# Patient Record
Sex: FEMALE | Race: WHITE | Employment: FULL TIME | URBAN - METROPOLITAN AREA
[De-identification: names, ages, dates, MRNs, and addresses within clinical notes are randomized per-mention and may not be internally consistent; named-entity substitution may affect disease eponyms.]

---

## 2017-01-17 ENCOUNTER — OFFICE VISIT (OUTPATIENT)
Dept: FAMILY MEDICINE CLINIC | Age: 57
End: 2017-01-17

## 2017-01-17 VITALS
HEIGHT: 63 IN | DIASTOLIC BLOOD PRESSURE: 60 MMHG | SYSTOLIC BLOOD PRESSURE: 110 MMHG | BODY MASS INDEX: 20.94 KG/M2 | WEIGHT: 118.2 LBS | HEART RATE: 88 BPM

## 2017-01-17 DIAGNOSIS — G25.81 RESTLESS LEG: ICD-10-CM

## 2017-01-17 DIAGNOSIS — F98.8 ADD (ATTENTION DEFICIT DISORDER): ICD-10-CM

## 2017-01-17 DIAGNOSIS — Z11.4 SCREENING FOR HIV (HUMAN IMMUNODEFICIENCY VIRUS): ICD-10-CM

## 2017-01-17 DIAGNOSIS — K59.01 SLOW TRANSIT CONSTIPATION: ICD-10-CM

## 2017-01-17 DIAGNOSIS — Z11.59 NEED FOR HEPATITIS C SCREENING TEST: ICD-10-CM

## 2017-01-17 DIAGNOSIS — H81.12 BENIGN POSITIONAL VERTIGO, LEFT: Primary | ICD-10-CM

## 2017-01-17 PROCEDURE — 99214 OFFICE O/P EST MOD 30 MIN: CPT | Performed by: FAMILY MEDICINE

## 2017-01-17 RX ORDER — ACETAMINOPHEN 160 MG
1 TABLET,DISINTEGRATING ORAL ONCE
COMMUNITY
End: 2017-01-17 | Stop reason: ALTCHOICE

## 2017-01-17 RX ORDER — POLYETHYLENE GLYCOL 3350 17 G/17G
17 POWDER, FOR SOLUTION ORAL DAILY
Qty: 30 EACH | Refills: 5 | Status: SHIPPED | OUTPATIENT
Start: 2017-01-17 | End: 2018-06-15 | Stop reason: SDUPTHER

## 2017-01-17 RX ORDER — ATOMOXETINE 40 MG/1
40 CAPSULE ORAL EVERY MORNING
Qty: 30 CAPSULE | Refills: 5 | Status: SHIPPED | OUTPATIENT
Start: 2017-01-17 | End: 2017-07-17 | Stop reason: SDUPTHER

## 2017-01-18 LAB
HEPATITIS C ANTIBODY INTERPRETATION: NORMAL
HIV-1 AND HIV-2 ANTIBODIES: NORMAL

## 2017-01-18 ASSESSMENT — ENCOUNTER SYMPTOMS: CONSTIPATION: 1

## 2017-01-20 ENCOUNTER — PATIENT MESSAGE (OUTPATIENT)
Dept: FAMILY MEDICINE CLINIC | Age: 57
End: 2017-01-20

## 2017-05-04 ENCOUNTER — OFFICE VISIT (OUTPATIENT)
Dept: FAMILY MEDICINE CLINIC | Age: 57
End: 2017-05-04

## 2017-05-04 VITALS
HEIGHT: 63 IN | SYSTOLIC BLOOD PRESSURE: 100 MMHG | BODY MASS INDEX: 21.26 KG/M2 | TEMPERATURE: 98.1 F | WEIGHT: 120 LBS | HEART RATE: 66 BPM | DIASTOLIC BLOOD PRESSURE: 70 MMHG

## 2017-05-04 DIAGNOSIS — R11.0 CHRONIC NAUSEA: Primary | ICD-10-CM

## 2017-05-04 DIAGNOSIS — Z82.62 FAMILY HISTORY OF OSTEOPOROSIS: ICD-10-CM

## 2017-05-04 DIAGNOSIS — K90.41 GLUTEN-SENSITIVE ENTEROPATHY: Chronic | ICD-10-CM

## 2017-05-04 DIAGNOSIS — J20.9 ACUTE BRONCHITIS, UNSPECIFIED ORGANISM: ICD-10-CM

## 2017-05-04 DIAGNOSIS — M40.14 OTHER SECONDARY KYPHOSIS, THORACIC REGION: ICD-10-CM

## 2017-05-04 PROBLEM — M40.209 HUMPBACK: Status: ACTIVE | Noted: 2017-05-04

## 2017-05-04 PROCEDURE — 99214 OFFICE O/P EST MOD 30 MIN: CPT | Performed by: FAMILY MEDICINE

## 2017-05-04 RX ORDER — AZITHROMYCIN 250 MG/1
TABLET, FILM COATED ORAL
Qty: 1 PACKET | Refills: 0 | Status: SHIPPED | OUTPATIENT
Start: 2017-05-04 | End: 2017-05-14

## 2017-05-04 RX ORDER — PROMETHAZINE HYDROCHLORIDE 25 MG/1
25 TABLET ORAL EVERY 6 HOURS PRN
Qty: 25 TABLET | Refills: 1 | Status: SHIPPED | OUTPATIENT
Start: 2017-05-04 | End: 2017-05-29

## 2017-05-04 ASSESSMENT — ENCOUNTER SYMPTOMS
RHINORRHEA: 1
COUGH: 1

## 2017-07-17 ENCOUNTER — OFFICE VISIT (OUTPATIENT)
Dept: FAMILY MEDICINE CLINIC | Age: 57
End: 2017-07-17

## 2017-07-17 VITALS
SYSTOLIC BLOOD PRESSURE: 94 MMHG | DIASTOLIC BLOOD PRESSURE: 54 MMHG | WEIGHT: 113.2 LBS | HEART RATE: 72 BPM | HEIGHT: 63 IN | BODY MASS INDEX: 20.06 KG/M2

## 2017-07-17 DIAGNOSIS — B00.9 HSV-1 (HERPES SIMPLEX VIRUS 1) INFECTION: Primary | ICD-10-CM

## 2017-07-17 DIAGNOSIS — F98.8 ADD (ATTENTION DEFICIT DISORDER): ICD-10-CM

## 2017-07-17 PROCEDURE — 99213 OFFICE O/P EST LOW 20 MIN: CPT | Performed by: FAMILY MEDICINE

## 2017-07-17 RX ORDER — VALACYCLOVIR HYDROCHLORIDE 1 G/1
TABLET, FILM COATED ORAL
Qty: 12 TABLET | Refills: 5 | Status: SHIPPED | OUTPATIENT
Start: 2017-07-17 | End: 2018-10-03 | Stop reason: SDUPTHER

## 2017-07-17 RX ORDER — THYROID,PORK 16.25 MG
TABLET ORAL
COMMUNITY
Start: 2017-06-20 | End: 2019-01-14

## 2017-07-17 RX ORDER — ATOMOXETINE 40 MG/1
40 CAPSULE ORAL EVERY MORNING
Qty: 30 CAPSULE | Refills: 5 | Status: SHIPPED | OUTPATIENT
Start: 2017-07-17 | End: 2018-01-18 | Stop reason: SDUPTHER

## 2017-07-17 RX ORDER — POVIDONE/POLYVINYL ALCOHOL 20; 27 G/1000ML; G/1000ML
1 SOLUTION/ DROPS OPHTHALMIC 2 TIMES DAILY
Refills: 3 | COMMUNITY
Start: 2017-05-30 | End: 2018-01-18

## 2017-07-17 ASSESSMENT — ENCOUNTER SYMPTOMS
SHORTNESS OF BREATH: 0
CHEST TIGHTNESS: 0

## 2017-07-17 ASSESSMENT — PATIENT HEALTH QUESTIONNAIRE - PHQ9
2. FEELING DOWN, DEPRESSED OR HOPELESS: 0
SUM OF ALL RESPONSES TO PHQ QUESTIONS 1-9: 0
SUM OF ALL RESPONSES TO PHQ9 QUESTIONS 1 & 2: 0
1. LITTLE INTEREST OR PLEASURE IN DOING THINGS: 0

## 2018-01-18 ENCOUNTER — OFFICE VISIT (OUTPATIENT)
Dept: FAMILY MEDICINE CLINIC | Age: 58
End: 2018-01-18

## 2018-01-18 VITALS
BODY MASS INDEX: 20.77 KG/M2 | HEIGHT: 63 IN | HEART RATE: 81 BPM | DIASTOLIC BLOOD PRESSURE: 64 MMHG | SYSTOLIC BLOOD PRESSURE: 100 MMHG | WEIGHT: 117.2 LBS | TEMPERATURE: 97.9 F

## 2018-01-18 DIAGNOSIS — F98.8 ATTENTION DEFICIT DISORDER (ADD) WITHOUT HYPERACTIVITY: ICD-10-CM

## 2018-01-18 PROCEDURE — G8420 CALC BMI NORM PARAMETERS: HCPCS | Performed by: FAMILY MEDICINE

## 2018-01-18 PROCEDURE — G8484 FLU IMMUNIZE NO ADMIN: HCPCS | Performed by: FAMILY MEDICINE

## 2018-01-18 PROCEDURE — 3014F SCREEN MAMMO DOC REV: CPT | Performed by: FAMILY MEDICINE

## 2018-01-18 PROCEDURE — 3017F COLORECTAL CA SCREEN DOC REV: CPT | Performed by: FAMILY MEDICINE

## 2018-01-18 PROCEDURE — G8427 DOCREV CUR MEDS BY ELIG CLIN: HCPCS | Performed by: FAMILY MEDICINE

## 2018-01-18 PROCEDURE — 99213 OFFICE O/P EST LOW 20 MIN: CPT | Performed by: FAMILY MEDICINE

## 2018-01-18 PROCEDURE — 1036F TOBACCO NON-USER: CPT | Performed by: FAMILY MEDICINE

## 2018-01-18 RX ORDER — ATOMOXETINE 40 MG/1
40 CAPSULE ORAL EVERY MORNING
Qty: 30 CAPSULE | Refills: 5 | Status: SHIPPED | OUTPATIENT
Start: 2018-01-18 | End: 2018-07-18 | Stop reason: SDUPTHER

## 2018-03-15 ENCOUNTER — OFFICE VISIT (OUTPATIENT)
Dept: FAMILY MEDICINE CLINIC | Age: 58
End: 2018-03-15

## 2018-03-15 VITALS
DIASTOLIC BLOOD PRESSURE: 68 MMHG | WEIGHT: 118 LBS | BODY MASS INDEX: 20.9 KG/M2 | SYSTOLIC BLOOD PRESSURE: 100 MMHG | TEMPERATURE: 98.1 F | HEART RATE: 82 BPM

## 2018-03-15 DIAGNOSIS — M25.552 LEFT HIP PAIN: Primary | ICD-10-CM

## 2018-03-15 DIAGNOSIS — J06.9 UPPER RESPIRATORY TRACT INFECTION, UNSPECIFIED TYPE: ICD-10-CM

## 2018-03-15 PROCEDURE — 99214 OFFICE O/P EST MOD 30 MIN: CPT | Performed by: FAMILY MEDICINE

## 2018-03-15 PROCEDURE — 3014F SCREEN MAMMO DOC REV: CPT | Performed by: FAMILY MEDICINE

## 2018-03-15 PROCEDURE — G8420 CALC BMI NORM PARAMETERS: HCPCS | Performed by: FAMILY MEDICINE

## 2018-03-15 PROCEDURE — G8427 DOCREV CUR MEDS BY ELIG CLIN: HCPCS | Performed by: FAMILY MEDICINE

## 2018-03-15 PROCEDURE — 1036F TOBACCO NON-USER: CPT | Performed by: FAMILY MEDICINE

## 2018-03-15 PROCEDURE — 3017F COLORECTAL CA SCREEN DOC REV: CPT | Performed by: FAMILY MEDICINE

## 2018-03-15 PROCEDURE — G8484 FLU IMMUNIZE NO ADMIN: HCPCS | Performed by: FAMILY MEDICINE

## 2018-03-15 RX ORDER — AMOXICILLIN 875 MG/1
875 TABLET, COATED ORAL 2 TIMES DAILY
Qty: 20 TABLET | Refills: 0 | Status: SHIPPED | OUTPATIENT
Start: 2018-03-15 | End: 2018-03-25

## 2018-03-15 ASSESSMENT — ENCOUNTER SYMPTOMS
RHINORRHEA: 1
COUGH: 1

## 2018-03-15 NOTE — PROGRESS NOTES
crepitus. Left hip: She exhibits decreased range of motion, decreased strength and bony tenderness. Legs:  Lymphadenopathy:        Head (right side): No submental, no submandibular and no posterior auricular adenopathy present. Head (left side): No submental, no submandibular and no posterior auricular adenopathy present. Right cervical: No superficial cervical, no deep cervical and no posterior cervical adenopathy present. Left cervical: No superficial cervical, no deep cervical and no posterior cervical adenopathy present. Psychiatric: She has a normal mood and affect. Her behavior is normal.       Body mass index is 20.9 kg/m². Wt Readings from Last 3 Encounters:   03/15/18 118 lb (53.5 kg)   01/18/18 117 lb 3.2 oz (53.2 kg)   07/17/17 113 lb 3.2 oz (51.3 kg)     BP Readings from Last 3 Encounters:   03/15/18 100/68   01/18/18 100/64   07/17/17 (!) 94/54          No results found for this visit on 03/15/18. Assessment:      1. Left hip pain  XR HIP LEFT (2-3 VIEWS)    Ambulatory referral to Physical Therapy   2. Upper respiratory tract infection, unspecified type  amoxicillin (AMOXIL) 875 MG tablet           Plan:              Increase fluids. Get plenty of rest.  Tylenol or Ibuprofen for fever or muscle aches. Wash hands frequently since you are contagious.

## 2018-03-26 ENCOUNTER — HOSPITAL ENCOUNTER (OUTPATIENT)
Dept: GENERAL RADIOLOGY | Age: 58
Discharge: HOME | End: 2018-03-26
Attending: FAMILY MEDICINE | Admitting: FAMILY MEDICINE

## 2018-03-26 DIAGNOSIS — M25.552 LEFT HIP PAIN: ICD-10-CM

## 2018-04-12 ENCOUNTER — HOSPITAL ENCOUNTER (OUTPATIENT)
Dept: PHYSICAL THERAPY | Age: 58
Discharge: HOME | End: 2018-04-30
Attending: FAMILY MEDICINE | Admitting: FAMILY MEDICINE

## 2018-05-01 ENCOUNTER — HOSPITAL ENCOUNTER (OUTPATIENT)
Dept: OTHER | Age: 58
Discharge: HOME | End: 2018-05-31
Attending: FAMILY MEDICINE | Admitting: FAMILY MEDICINE

## 2018-05-08 ENCOUNTER — TELEPHONE (OUTPATIENT)
Dept: FAMILY MEDICINE CLINIC | Age: 58
End: 2018-05-08

## 2018-05-08 DIAGNOSIS — M25.552 LEFT HIP PAIN: Primary | ICD-10-CM

## 2018-06-06 ENCOUNTER — TELEPHONE (OUTPATIENT)
Dept: FAMILY MEDICINE CLINIC | Age: 58
End: 2018-06-06

## 2018-06-15 ENCOUNTER — TELEPHONE (OUTPATIENT)
Dept: FAMILY MEDICINE CLINIC | Age: 58
End: 2018-06-15

## 2018-06-15 ENCOUNTER — OFFICE VISIT (OUTPATIENT)
Dept: FAMILY MEDICINE CLINIC | Age: 58
End: 2018-06-15

## 2018-06-15 VITALS
DIASTOLIC BLOOD PRESSURE: 70 MMHG | HEART RATE: 66 BPM | SYSTOLIC BLOOD PRESSURE: 118 MMHG | BODY MASS INDEX: 20.73 KG/M2 | WEIGHT: 117 LBS | HEIGHT: 63 IN

## 2018-06-15 DIAGNOSIS — Z12.11 SCREEN FOR COLON CANCER: ICD-10-CM

## 2018-06-15 DIAGNOSIS — K59.01 SLOW TRANSIT CONSTIPATION: Primary | ICD-10-CM

## 2018-06-15 DIAGNOSIS — Z12.39 SCREENING FOR BREAST CANCER: ICD-10-CM

## 2018-06-15 PROCEDURE — 1036F TOBACCO NON-USER: CPT | Performed by: FAMILY MEDICINE

## 2018-06-15 PROCEDURE — G8427 DOCREV CUR MEDS BY ELIG CLIN: HCPCS | Performed by: FAMILY MEDICINE

## 2018-06-15 PROCEDURE — G8420 CALC BMI NORM PARAMETERS: HCPCS | Performed by: FAMILY MEDICINE

## 2018-06-15 PROCEDURE — 99213 OFFICE O/P EST LOW 20 MIN: CPT | Performed by: FAMILY MEDICINE

## 2018-06-15 PROCEDURE — 3017F COLORECTAL CA SCREEN DOC REV: CPT | Performed by: FAMILY MEDICINE

## 2018-06-15 RX ORDER — POLYETHYLENE GLYCOL 3350 17 G/17G
17 POWDER, FOR SOLUTION ORAL DAILY
Qty: 30 EACH | Refills: 5 | Status: SHIPPED | OUTPATIENT
Start: 2018-06-15 | End: 2019-01-14

## 2018-06-15 RX ORDER — LIFITEGRAST 50 MG/ML
1 SOLUTION/ DROPS OPHTHALMIC 2 TIMES DAILY
COMMUNITY
Start: 2018-06-01 | End: 2019-01-14

## 2018-06-15 ASSESSMENT — ENCOUNTER SYMPTOMS
CONSTIPATION: 1
BACK PAIN: 1
BLOOD IN STOOL: 0

## 2018-06-27 ENCOUNTER — HOSPITAL ENCOUNTER (OUTPATIENT)
Dept: WOMENS IMAGING | Age: 58
Discharge: HOME | End: 2018-06-27
Attending: FAMILY MEDICINE | Admitting: FAMILY MEDICINE

## 2018-06-27 DIAGNOSIS — Z12.39 SCREENING FOR BREAST CANCER: ICD-10-CM

## 2018-06-27 DIAGNOSIS — Z12.31 ENCOUNTER FOR SCREENING MAMMOGRAM FOR MALIGNANT NEOPLASM OF BREAST: ICD-10-CM

## 2018-07-13 ENCOUNTER — OFFICE VISIT (OUTPATIENT)
Dept: ORTHOPEDIC SURGERY | Age: 58
End: 2018-07-13

## 2018-07-13 VITALS — WEIGHT: 116 LBS | HEIGHT: 63 IN | BODY MASS INDEX: 20.55 KG/M2 | RESPIRATION RATE: 12 BRPM

## 2018-07-13 DIAGNOSIS — M76.31 IT BAND SYNDROME, RIGHT: ICD-10-CM

## 2018-07-13 DIAGNOSIS — R52 PAIN: ICD-10-CM

## 2018-07-13 DIAGNOSIS — M70.62 TROCHANTERIC BURSITIS OF LEFT HIP: Primary | ICD-10-CM

## 2018-07-13 PROCEDURE — G8420 CALC BMI NORM PARAMETERS: HCPCS | Performed by: ORTHOPAEDIC SURGERY

## 2018-07-13 PROCEDURE — 3017F COLORECTAL CA SCREEN DOC REV: CPT | Performed by: ORTHOPAEDIC SURGERY

## 2018-07-13 PROCEDURE — 20610 DRAIN/INJ JOINT/BURSA W/O US: CPT | Performed by: ORTHOPAEDIC SURGERY

## 2018-07-13 PROCEDURE — 99244 OFF/OP CNSLTJ NEW/EST MOD 40: CPT | Performed by: ORTHOPAEDIC SURGERY

## 2018-07-13 PROCEDURE — G8427 DOCREV CUR MEDS BY ELIG CLIN: HCPCS | Performed by: ORTHOPAEDIC SURGERY

## 2018-07-13 ASSESSMENT — ENCOUNTER SYMPTOMS
EYE REDNESS: 1
HEARTBURN: 1
RESPIRATORY NEGATIVE: 1
CONSTIPATION: 1
ABDOMINAL PAIN: 1
BACK PAIN: 1

## 2018-07-18 ENCOUNTER — OFFICE VISIT (OUTPATIENT)
Dept: FAMILY MEDICINE CLINIC | Age: 58
End: 2018-07-18

## 2018-07-18 VITALS
WEIGHT: 113.6 LBS | BODY MASS INDEX: 20.13 KG/M2 | DIASTOLIC BLOOD PRESSURE: 70 MMHG | SYSTOLIC BLOOD PRESSURE: 110 MMHG | HEIGHT: 63 IN | HEART RATE: 74 BPM

## 2018-07-18 DIAGNOSIS — F98.8 ATTENTION DEFICIT DISORDER (ADD) WITHOUT HYPERACTIVITY: Primary | ICD-10-CM

## 2018-07-18 DIAGNOSIS — Z79.899 POLYPHARMACY: ICD-10-CM

## 2018-07-18 PROCEDURE — 99213 OFFICE O/P EST LOW 20 MIN: CPT | Performed by: FAMILY MEDICINE

## 2018-07-18 PROCEDURE — G8427 DOCREV CUR MEDS BY ELIG CLIN: HCPCS | Performed by: FAMILY MEDICINE

## 2018-07-18 PROCEDURE — 3017F COLORECTAL CA SCREEN DOC REV: CPT | Performed by: FAMILY MEDICINE

## 2018-07-18 PROCEDURE — 1036F TOBACCO NON-USER: CPT | Performed by: FAMILY MEDICINE

## 2018-07-18 PROCEDURE — G8420 CALC BMI NORM PARAMETERS: HCPCS | Performed by: FAMILY MEDICINE

## 2018-07-18 RX ORDER — ATOMOXETINE 40 MG/1
40 CAPSULE ORAL EVERY MORNING
Qty: 30 CAPSULE | Refills: 5 | Status: SHIPPED | OUTPATIENT
Start: 2018-07-18 | End: 2019-01-14 | Stop reason: SDUPTHER

## 2018-07-18 ASSESSMENT — PATIENT HEALTH QUESTIONNAIRE - PHQ9
2. FEELING DOWN, DEPRESSED OR HOPELESS: 0
SUM OF ALL RESPONSES TO PHQ QUESTIONS 1-9: 0
1. LITTLE INTEREST OR PLEASURE IN DOING THINGS: 0
SUM OF ALL RESPONSES TO PHQ9 QUESTIONS 1 & 2: 0

## 2018-07-18 NOTE — PROGRESS NOTES
Patient ID: Violette Gupta 1960      HPI   attention deficit disorder: Here for Strattera refill.  Takes her medication 7 days a week.  Is doing well on this medication. Would like to continue. Unable to focus without it    Review of Systems   Constitutional: Negative. Psychiatric/Behavioral: Positive for decreased concentration.        Patient Active Problem List   Diagnosis    Gluten-sensitive enteropathy    Panic disorder    Allergic rhinitis    HSV-1 (herpes simplex virus 1) infection    Gastroesophageal reflux disease without esophagitis    Slow transit constipation    ADD (attention deficit disorder)    Family history of osteoporosis    Humpback    Impingement syndrome of right shoulder    Right rotator cuff tendinitis       Past Medical History:   Diagnosis Date    ADD (attention deficit disorder)     Celiac disease     Nausea        Past Surgical History:   Procedure Laterality Date    COLONOSCOPY  2008    ENDOSCOPY, COLON, DIAGNOSTIC      PATELLA SURGERY Left 2004    SHOULDER SURGERY Right 2015    Fester    TONSILLECTOMY      WISDOM TOOTH EXTRACTION         Family History   Problem Relation Age of Onset    Dementia Mother 79        Picks Disease    Celiac Disease Mother     Osteoporosis Mother     Heart Attack Father 68    Cancer Father         Lymphoma    Thyroid Disease Sister         Graves    Osteoporosis Sister     Thyroid Disease Sister     Celiac Disease Sister     Rheum Arthritis Sister        Current Outpatient Prescriptions on File Prior to Visit   Medication Sig Dispense Refill    XIIDRA 5 % SOLN Place 1 drop into both eyes 2 times daily      NONFORMULARY Take 1 mg by mouth daily Iodine/Pot Iodine 1mg capsule      polyethylene glycol (MIRALAX) PACK packet Take 17 g by mouth daily 30 each 5    Cyanocobalamin (VITAMIN B-12 PO) Take by mouth      Menaquinone-7 (VITAMIN K2 PO) Take by mouth      Cholecalciferol (VITAMIN D3 PO) Take by mouth      WP

## 2018-12-26 DIAGNOSIS — K59.01 SLOW TRANSIT CONSTIPATION: Primary | ICD-10-CM

## 2018-12-26 RX ORDER — POLYETHYLENE GLYCOL 3350 17 G/17G
17 POWDER, FOR SOLUTION ORAL DAILY PRN
Qty: 510 G | Refills: 5 | Status: SHIPPED | OUTPATIENT
Start: 2018-12-26 | End: 2019-06-13 | Stop reason: SDUPTHER

## 2019-01-14 ENCOUNTER — OFFICE VISIT (OUTPATIENT)
Dept: FAMILY MEDICINE CLINIC | Age: 59
End: 2019-01-14
Payer: COMMERCIAL

## 2019-01-14 VITALS
WEIGHT: 118.6 LBS | HEART RATE: 80 BPM | BODY MASS INDEX: 21.02 KG/M2 | SYSTOLIC BLOOD PRESSURE: 90 MMHG | HEIGHT: 63 IN | DIASTOLIC BLOOD PRESSURE: 60 MMHG

## 2019-01-14 DIAGNOSIS — K59.01 SLOW TRANSIT CONSTIPATION: Primary | ICD-10-CM

## 2019-01-14 DIAGNOSIS — B00.9 HSV-1 (HERPES SIMPLEX VIRUS 1) INFECTION: ICD-10-CM

## 2019-01-14 DIAGNOSIS — F98.8 ATTENTION DEFICIT DISORDER (ADD) WITHOUT HYPERACTIVITY: ICD-10-CM

## 2019-01-14 PROCEDURE — 3017F COLORECTAL CA SCREEN DOC REV: CPT | Performed by: FAMILY MEDICINE

## 2019-01-14 PROCEDURE — G8484 FLU IMMUNIZE NO ADMIN: HCPCS | Performed by: FAMILY MEDICINE

## 2019-01-14 PROCEDURE — 1036F TOBACCO NON-USER: CPT | Performed by: FAMILY MEDICINE

## 2019-01-14 PROCEDURE — G8427 DOCREV CUR MEDS BY ELIG CLIN: HCPCS | Performed by: FAMILY MEDICINE

## 2019-01-14 PROCEDURE — G8420 CALC BMI NORM PARAMETERS: HCPCS | Performed by: FAMILY MEDICINE

## 2019-01-14 PROCEDURE — 99213 OFFICE O/P EST LOW 20 MIN: CPT | Performed by: FAMILY MEDICINE

## 2019-01-14 RX ORDER — CYCLOSPORINE 0.5 MG/ML
1 EMULSION OPHTHALMIC 2 TIMES DAILY
Refills: 1 | COMMUNITY
Start: 2018-12-20

## 2019-01-14 RX ORDER — LEVOTHYROXINE AND LIOTHYRONINE 9.5; 2.25 UG/1; UG/1
1 TABLET ORAL DAILY
COMMUNITY
Start: 2018-06-21

## 2019-01-14 RX ORDER — ATOMOXETINE 40 MG/1
40 CAPSULE ORAL EVERY MORNING
Qty: 30 CAPSULE | Refills: 5 | Status: SHIPPED | OUTPATIENT
Start: 2019-01-14 | End: 2019-06-13 | Stop reason: SDUPTHER

## 2019-01-14 RX ORDER — VALACYCLOVIR HYDROCHLORIDE 1 G/1
TABLET, FILM COATED ORAL
Qty: 12 TABLET | Refills: 2 | Status: SHIPPED | OUTPATIENT
Start: 2019-01-14 | End: 2019-12-23

## 2019-01-14 ASSESSMENT — ENCOUNTER SYMPTOMS
DIARRHEA: 0
CONSTIPATION: 1

## 2019-03-08 LAB
T3 FREE: 3.1
T4 FREE: 1.23

## 2019-04-17 NOTE — PROGRESS NOTES
left hip greater trochanteric bursa injection procedure note    Pre-procedure diagnosis:  left hip greater trochanteric bursitis    Post-procedure diagnosis:  same    Procedure: The planned procedure/risks/benefits/alternatives were discussed with the patient. Risks include, but are not limited to, increased pain, drug reaction, infection, bleeding, lack of improvement, neurovascular injury, and increased blood sugar levels. The patient understood and all of their questions were answered. The lateral hip was prepped with alcohol and betadine, then anesthetized with Ethyl Chloride spray. A 22 gauge needle was advanced into the greater trochanteric bursa  without difficulty. The bursa was then injected with 3 ml 1% lidocaine, 3 ml 0.5% bupivicaine and 3 ml of depo-medrol 80mg/ml. The injection site was cleansed with isopropyl alcohol and a band-aid was placed. Complications:  None, the patient tolerated the procedure well. Instructions: The patient was advised to rest the hip and decrease activity for the next 24 to 48 hours. May use prescription or OTC pain relievers as needed for any post-injection pain as well as ice. Follow blood sugars for the next few days.
Disease Mother     Osteoporosis Mother     Heart Attack Father 68    Cancer Father         Lymphoma    Thyroid Disease Sister         Graves    Osteoporosis Sister     Thyroid Disease Sister     Celiac Disease Sister     Rheum Arthritis Sister        Social History     Social History    Marital status:      Spouse name: N/A    Number of children: N/A    Years of education: N/A     Occupational History    OT      [employer]     Social History Main Topics    Smoking status: Never Smoker    Smokeless tobacco: Never Used    Alcohol use 0.0 oz/week      Comment: occas    Drug use: No    Sexual activity: Not Currently     Other Topics Concern    None     Social History Narrative    None       Current Outpatient Prescriptions   Medication Sig Dispense Refill    XIIDRA 5 % SOLN Place 1 drop into both eyes 2 times daily      NONFORMULARY Take 1 mg by mouth daily Iodine/Pot Iodine 1mg capsule      polyethylene glycol (MIRALAX) PACK packet Take 17 g by mouth daily 30 each 5    Cyanocobalamin (VITAMIN B-12 PO) Take by mouth      Menaquinone-7 (VITAMIN K2 PO) Take by mouth      Cholecalciferol (VITAMIN D3 PO) Take by mouth      atomoxetine (STRATTERA) 40 MG capsule Take 1 capsule by mouth every morning 30 capsule 5    WP THYROID 16.25 MG TABS       valACYclovir (VALTREX) 1 g tablet 2 tabs PO Q12 hours X 1 day for cold sores 12 tablet 5    Ascorbic Acid (VITAMIN C) 500 MG tablet Take 500 mg by mouth daily      Omega-3 Fatty Acids (OMEGA 3 PO) Take by mouth daily      magnesium 30 MG tablet Take 30 mg by mouth 2 times daily       No current facility-administered medications for this visit.         Allergies   Allergen Reactions    Gluten Meal     Seasonal        Review of Systems:  See above    Physical Exam:   Resp 12   Ht 5' 3\" (1.6 m)   Wt 116 lb (52.6 kg)   LMP 11/09/2011 (Exact Date)   BMI 20.55 kg/m²        Gait is Normal. The patient can bear weight on the injured

## 2019-06-13 ENCOUNTER — OFFICE VISIT (OUTPATIENT)
Dept: FAMILY MEDICINE CLINIC | Age: 59
End: 2019-06-13
Payer: COMMERCIAL

## 2019-06-13 VITALS
HEIGHT: 63 IN | HEART RATE: 72 BPM | SYSTOLIC BLOOD PRESSURE: 98 MMHG | BODY MASS INDEX: 20.55 KG/M2 | WEIGHT: 116 LBS | DIASTOLIC BLOOD PRESSURE: 62 MMHG

## 2019-06-13 DIAGNOSIS — M25.561 CHRONIC PAIN OF RIGHT KNEE: ICD-10-CM

## 2019-06-13 DIAGNOSIS — R53.83 FATIGUE, UNSPECIFIED TYPE: ICD-10-CM

## 2019-06-13 DIAGNOSIS — G89.29 CHRONIC PAIN OF RIGHT KNEE: ICD-10-CM

## 2019-06-13 DIAGNOSIS — F98.8 ATTENTION DEFICIT DISORDER (ADD) WITHOUT HYPERACTIVITY: Primary | ICD-10-CM

## 2019-06-13 DIAGNOSIS — M25.551 RIGHT HIP PAIN: ICD-10-CM

## 2019-06-13 DIAGNOSIS — K59.01 SLOW TRANSIT CONSTIPATION: ICD-10-CM
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
## 2019-06-13 LAB
ANION GAP SERPL CALCULATED.3IONS-SCNC: 11 MMOL/L (ref 3–16)
BASOPHILS ABSOLUTE: 0 K/UL (ref 0–0.2)
BASOPHILS RELATIVE PERCENT: 0.9 %
BUN BLDV-MCNC: 12 MG/DL (ref 7–20)
CALCIUM SERPL-MCNC: 9.8 MG/DL (ref 8.3–10.6)
CHLORIDE BLD-SCNC: 103 MMOL/L (ref 99–110)
CO2: 27 MMOL/L (ref 21–32)
CREAT SERPL-MCNC: 0.6 MG/DL (ref 0.6–1.1)
EOSINOPHILS ABSOLUTE: 0.2 K/UL (ref 0–0.6)
EOSINOPHILS RELATIVE PERCENT: 3.7 %
GFR AFRICAN AMERICAN: >60
GFR NON-AFRICAN AMERICAN: >60
GLUCOSE BLD-MCNC: 78 MG/DL (ref 70–99)
HCT VFR BLD CALC: 40.4 % (ref 36–48)
HEMOGLOBIN: 13.5 G/DL (ref 12–16)
LYMPHOCYTES ABSOLUTE: 1.1 K/UL (ref 1–5.1)
LYMPHOCYTES RELATIVE PERCENT: 23.3 %
MCH RBC QN AUTO: 30.3 PG (ref 26–34)
MCHC RBC AUTO-ENTMCNC: 33.4 G/DL (ref 31–36)
MCV RBC AUTO: 90.9 FL (ref 80–100)
MONOCYTES ABSOLUTE: 0.4 K/UL (ref 0–1.3)
MONOCYTES RELATIVE PERCENT: 8.6 %
NEUTROPHILS ABSOLUTE: 3 K/UL (ref 1.7–7.7)
NEUTROPHILS RELATIVE PERCENT: 63.5 %
PDW BLD-RTO: 13.5 % (ref 12.4–15.4)
PLATELET # BLD: 270 K/UL (ref 135–450)
PMV BLD AUTO: 9.1 FL (ref 5–10.5)
POTASSIUM SERPL-SCNC: 4.1 MMOL/L (ref 3.5–5.1)
RBC # BLD: 4.45 M/UL (ref 4–5.2)
SODIUM BLD-SCNC: 141 MMOL/L (ref 136–145)
TSH REFLEX: 2.34 UIU/ML (ref 0.27–4.2)
WBC # BLD: 4.8 K/UL (ref 4–11)

## 2019-06-13 PROCEDURE — 1036F TOBACCO NON-USER: CPT | Performed by: FAMILY MEDICINE

## 2019-06-13 PROCEDURE — 99214 OFFICE O/P EST MOD 30 MIN: CPT | Performed by: FAMILY MEDICINE

## 2019-06-13 PROCEDURE — G8420 CALC BMI NORM PARAMETERS: HCPCS | Performed by: FAMILY MEDICINE

## 2019-06-13 PROCEDURE — 3017F COLORECTAL CA SCREEN DOC REV: CPT | Performed by: FAMILY MEDICINE

## 2019-06-13 PROCEDURE — 36415 COLL VENOUS BLD VENIPUNCTURE: CPT | Performed by: FAMILY MEDICINE

## 2019-06-13 PROCEDURE — G8427 DOCREV CUR MEDS BY ELIG CLIN: HCPCS | Performed by: FAMILY MEDICINE

## 2019-06-13 RX ORDER — POLYETHYLENE GLYCOL 3350 17 G/17G
17 POWDER, FOR SOLUTION ORAL DAILY PRN
Qty: 510 G | Refills: 11 | Status: SHIPPED | OUTPATIENT
Start: 2019-06-13 | End: 2019-07-13

## 2019-06-13 RX ORDER — ATOMOXETINE 40 MG/1
40 CAPSULE ORAL EVERY MORNING
Qty: 30 CAPSULE | Refills: 5 | Status: SHIPPED | OUTPATIENT
Start: 2019-06-13 | End: 2020-12-17 | Stop reason: SDUPTHER

## 2019-06-13 ASSESSMENT — PATIENT HEALTH QUESTIONNAIRE - PHQ9
SUM OF ALL RESPONSES TO PHQ QUESTIONS 1-9: 1
1. LITTLE INTEREST OR PLEASURE IN DOING THINGS: 0
SUM OF ALL RESPONSES TO PHQ QUESTIONS 1-9: 1
2. FEELING DOWN, DEPRESSED OR HOPELESS: 1
SUM OF ALL RESPONSES TO PHQ9 QUESTIONS 1 & 2: 1

## 2019-06-13 ASSESSMENT — ENCOUNTER SYMPTOMS: APNEA: 0

## 2019-06-13 NOTE — PROGRESS NOTES
Patient ID: Quin Longoria 1960    Chief Complaint   Patient presents with    ADHD    Constipation         Fatigue   This is a new problem. The problem occurs daily. The problem has been unchanged. Associated symptoms include arthralgias and fatigue. Associated symptoms comments: Gets about 6 hours of sleep. Denies caffeine, alcohol, smoking before bed. Nothing aggravates the symptoms. Treatments tried: using \"wool thing\" to sleep on  The treatment provided no relief. Hip Pain    The pain is present in the right hip. The pain is moderate. Treatments tried: PT and ortho. Knee Pain    The incident occurred more than 1 week ago. The pain is present in the right knee. The pain is moderate. Tick bites: 2 weeks ago, had about 6 ticks on her    Review of Systems   Constitutional: Positive for fatigue. Respiratory: Negative for apnea. Musculoskeletal: Positive for arthralgias and gait problem. Psychiatric/Behavioral: Positive for dysphoric mood and sleep disturbance. The patient is nervous/anxious.         Patient Active Problem List   Diagnosis    Gluten-sensitive enteropathy    Allergic rhinitis    HSV-1 (herpes simplex virus 1) infection    Gastroesophageal reflux disease without esophagitis    Slow transit constipation    ADD (attention deficit disorder)    Family history of osteoporosis    Kyphosis    Impingement syndrome of right shoulder    Right rotator cuff tendinitis       Past Medical History:   Diagnosis Date    ADD (attention deficit disorder)     Celiac disease     Nausea        Past Surgical History:   Procedure Laterality Date    COLONOSCOPY  2008    COLONOSCOPY  07/27/2018    repeat in 10 years    ENDOSCOPY, COLON, DIAGNOSTIC      PATELLA SURGERY Left 2004    SHOULDER SURGERY Right 2015    Fester    TONSILLECTOMY      WISDOM TOOTH EXTRACTION         Family History   Problem Relation Age of Onset    Dementia Mother 79        Picks Disease    Celiac Disease Mother  Osteoporosis Mother     Heart Attack Father 68    Cancer Father         Lymphoma    Thyroid Disease Sister         Graves    Osteoporosis Sister     Thyroid Disease Sister     Celiac Disease Sister     Rheum Arthritis Sister        Current Outpatient Medications on File Prior to Visit   Medication Sig Dispense Refill    thyroid (ARMOUR THYROID) 15 MG tablet Take 1 tablet by mouth daily      RESTASIS 0.05 % ophthalmic emulsion Apply 1 drop to eye 2 times daily  1    atomoxetine (STRATTERA) 40 MG capsule Take 1 capsule by mouth every morning 30 capsule 5    Cholecalciferol (VITAMIN D3 PO) Take by mouth      magnesium 30 MG tablet Take 30 mg by mouth 2 times daily      valACYclovir (VALTREX) 1 g tablet 2 TABS BY MOUTH EVERY 12 HOURS HOURS X 1 DAY FOR COLD SORES 12 tablet 2    NONFORMULARY Take 1 mg by mouth daily Iodine/Pot Iodine 1mg capsule      Cyanocobalamin (VITAMIN B-12 PO) Take by mouth      Menaquinone-7 (VITAMIN K2 PO) Take by mouth      Ascorbic Acid (VITAMIN C) 500 MG tablet Take 500 mg by mouth daily      Omega-3 Fatty Acids (OMEGA 3 PO) Take by mouth daily       No current facility-administered medications on file prior to visit. Objective:           Physical Exam   Constitutional: She is oriented to person, place, and time. She appears well-developed and well-nourished. No distress. HENT:   Head: Normocephalic and atraumatic. Right Ear: Hearing, tympanic membrane and external ear normal.   Left Ear: Hearing, tympanic membrane and external ear normal.   Nose: Nose normal. No mucosal edema, rhinorrhea, nose lacerations, sinus tenderness or nasal deformity. Right sinus exhibits no maxillary sinus tenderness and no frontal sinus tenderness. Left sinus exhibits no maxillary sinus tenderness and no frontal sinus tenderness.    Mouth/Throat: Oropharynx is clear and moist and mucous membranes are normal. No oropharyngeal exudate, posterior oropharyngeal edema or posterior oropharyngeal erythema. Eyes: Conjunctivae are normal.   Neck: No tracheal deviation present. No thyromegaly present. Cardiovascular: Normal rate, regular rhythm, S1 normal, S2 normal and normal heart sounds. Exam reveals no gallop and no friction rub. Pulmonary/Chest: No respiratory distress. She has no wheezes. She has no rales. Lymphadenopathy:        Head (right side): No submental, no submandibular and no posterior auricular adenopathy present. Head (left side): No submental, no submandibular and no posterior auricular adenopathy present. Right cervical: No superficial cervical, no deep cervical and no posterior cervical adenopathy present. Left cervical: No superficial cervical, no deep cervical and no posterior cervical adenopathy present. Neurological: She is oriented to person, place, and time. .     Skin: Skin is warm, dry and intact. Psychiatric: She has a normal mood and affect. Her speech is normal and behavior is normal. Judgment and thought content normal. She is not actively hallucinating. Cognition and memory are normal. She is attentive. Nursing note and vitals reviewed. Vitals:    06/13/19 0958   BP: 98/62   Site: Left Upper Arm   Position: Sitting   Cuff Size: Child   Pulse: 72   Weight: 116 lb (52.6 kg)   Height: 5' 3.25\" (1.607 m)     Body mass index is 20.39 kg/m². Wt Readings from Last 3 Encounters:   06/13/19 116 lb (52.6 kg)   01/14/19 118 lb 9.6 oz (53.8 kg)   07/18/18 113 lb 9.6 oz (51.5 kg)     BP Readings from Last 3 Encounters:   06/13/19 98/62   01/14/19 90/60   07/18/18 110/70          No results found for this visit on 06/13/19. Assessment:       Diagnosis Orders   1. Attention deficit disorder (ADD) without hyperactivity  atomoxetine (STRATTERA) 40 MG capsule   2. Slow transit constipation  polyethylene glycol (GLYCOLAX) powder   3.  Chronic pain of right knee  Polly Sorensen DO, Orthopedic Surgery, [place]   4. Right hip pain  226 No Melissa Saunders DO, Orthopedic Surgery, [facility]   5. Fatigue, unspecified type  Hemoglobin A1C    TSH with Reflex    Basic Metabolic Panel    CBC Auto Differential   6. Tick bite, initial encounter  Lyme Disease AB Total W Rflx to IgG/ IgM           Plan:      Continue Strattera for the attention deficit disorder    Continue the MiraLAX for the constipation    Referral to Ortho for her chronic joint pain    See orders for fatigue. I suspect she is not getting enough sleep. Since this is due to pain, again recommend she follow-up with Ortho.

## 2019-06-13 NOTE — PATIENT INSTRUCTIONS
Patient Education        Constipation: Care Instructions  Your Care Instructions    Constipation means that you have a hard time passing stools (bowel movements). People pass stools from 3 times a day to once every 3 days. What is normal for you may be different. Constipation may occur with pain in the rectum and cramping. The pain may get worse when you try to pass stools. Sometimes there are small amounts of bright red blood on toilet paper or the surface of stools. This is because of enlarged veins near the rectum (hemorrhoids). A few changes in your diet and lifestyle may help you avoid ongoing constipation. Your doctor may also prescribe medicine to help loosen your stool. Some medicines can cause constipation. These include pain medicines and antidepressants. Tell your doctor about all the medicines you take. Your doctor may want to make a medicine change to ease your symptoms. Follow-up care is a key part of your treatment and safety. Be sure to make and go to all appointments, and call your doctor if you are having problems. It's also a good idea to know your test results and keep a list of the medicines you take. How can you care for yourself at home? · Drink plenty of fluids, enough so that your urine is light yellow or clear like water. If you have kidney, heart, or liver disease and have to limit fluids, talk with your doctor before you increase the amount of fluids you drink. · Include high-fiber foods in your diet each day. These include fruits, vegetables, beans, and whole grains. · Get at least 30 minutes of exercise on most days of the week. Walking is a good choice. You also may want to do other activities, such as running, swimming, cycling, or playing tennis or team sports. · Take a fiber supplement, such as Citrucel or Metamucil, every day. Read and follow all instructions on the label. · Schedule time each day for a bowel movement. A daily routine may help.  Take your time having your bowel movement. · Support your feet with a small step stool when you sit on the toilet. This helps flex your hips and places your pelvis in a squatting position. · Your doctor may recommend an over-the-counter laxative to relieve your constipation. Examples are Milk of Magnesia and MiraLax. Read and follow all instructions on the label. Do not use laxatives on a long-term basis. When should you call for help? Call your doctor now or seek immediate medical care if:    · You have new or worse belly pain.     · You have new or worse nausea or vomiting.     · You have blood in your stools.    Watch closely for changes in your health, and be sure to contact your doctor if:    · Your constipation is getting worse.     · You do not get better as expected. Where can you learn more? Go to https://Appiphanyradhaeb.[employer]bound technologies. org and sign in to your [employer] account. Enter 21 [phone] in the Drug[employer].com box to learn more about \"Constipation: Care Instructions. \"     If you do not have an account, please click on the \"Sign Up Now\" link. Current as of: September 23, 2018  Content Version: 12.0  © 7170-8611 Healthwise, Incorporated. Care instructions adapted under license by [facility] ([facility]). If you have questions about a medical condition or this instruction, always ask your healthcare professional. [address] any warranty or liability for your use of this information.

## 2019-06-14 LAB
ESTIMATED AVERAGE GLUCOSE: 125.5 MG/DL
HBA1C MFR BLD: 6 %

## 2019-06-15 LAB — LYME, EIA: 0.57 LIV (ref 0–1.2)

## 2019-06-17 PROBLEM — R73.02 IMPAIRED GLUCOSE TOLERANCE: Status: ACTIVE | Noted: 2019-06-17

## 2019-06-19 ENCOUNTER — OFFICE VISIT (OUTPATIENT)
Dept: FAMILY MEDICINE CLINIC | Age: 59
End: 2019-06-19
Payer: COMMERCIAL

## 2019-06-19 VITALS
HEART RATE: 71 BPM | WEIGHT: 115.6 LBS | BODY MASS INDEX: 20.48 KG/M2 | HEIGHT: 63 IN | SYSTOLIC BLOOD PRESSURE: 102 MMHG | DIASTOLIC BLOOD PRESSURE: 64 MMHG

## 2019-06-19 DIAGNOSIS — R73.02 IMPAIRED GLUCOSE TOLERANCE: Primary | ICD-10-CM

## 2019-06-19 PROCEDURE — G8427 DOCREV CUR MEDS BY ELIG CLIN: HCPCS | Performed by: FAMILY MEDICINE

## 2019-06-19 PROCEDURE — 3017F COLORECTAL CA SCREEN DOC REV: CPT | Performed by: FAMILY MEDICINE

## 2019-06-19 PROCEDURE — G8420 CALC BMI NORM PARAMETERS: HCPCS | Performed by: FAMILY MEDICINE

## 2019-06-19 PROCEDURE — 1036F TOBACCO NON-USER: CPT | Performed by: FAMILY MEDICINE

## 2019-06-19 PROCEDURE — 99213 OFFICE O/P EST LOW 20 MIN: CPT | Performed by: FAMILY MEDICINE

## 2019-06-19 NOTE — PATIENT INSTRUCTIONS
Patient Education        Prediabetes: Care Instructions  Your Care Instructions    Prediabetes is a warning sign that you are at risk for getting type 2 diabetes. It means that your blood sugar is higher than it should be. The food you eat turns into sugar, which your body uses for energy. Normally, an organ called the pancreas makes insulin, which allows the sugar in your blood to get into your body's cells. But when your body can't use insulin the right way, the sugar doesn't move into cells. It stays in your blood instead. This is called insulin resistance. The buildup of sugar in the blood causes prediabetes. The good news is that lifestyle changes may help you get your blood sugar back to normal and help you avoid or delay diabetes. Follow-up care is a key part of your treatment and safety. Be sure to make and go to all appointments, and call your doctor if you are having problems. It's also a good idea to know your test results and keep a list of the medicines you take. How can you care for yourself at home? · Watch your weight. A healthy weight helps your body use insulin properly. · Limit the amount of calories, sweets, and unhealthy fat you eat. Ask your doctor if you should see a dietitian. A registered dietitian can help you create meal plans that fit your lifestyle. · Get at least 30 minutes of exercise on most days of the week. Exercise helps control your blood sugar. It also helps you maintain a healthy weight. Walking is a good choice. You also may want to do other activities, such as running, swimming, cycling, or playing tennis or team sports. · Do not smoke. Smoking can make prediabetes worse. If you need help quitting, talk to your doctor about stop-smoking programs and medicines. These can increase your chances of quitting for good. · If your doctor prescribed medicines, take them exactly as prescribed. Call your doctor if you think you are having a problem with your medicine.  You will get more details on the specific medicines your doctor prescribes. When should you call for help? Watch closely for changes in your health, and be sure to contact your doctor if:    · You have any symptoms of diabetes. These may include:  ? Being thirsty more often. ? Urinating more. ? Being hungrier. ? Losing weight. ? Being very tired. ? Having blurry vision.     · You have a wound that will not heal.     · You have an infection that will not go away.     · You have problems with your blood pressure.     · You want more information about diabetes and how you can keep from getting it. Where can you learn more? Go to https://Hingipepiceweb.[email or web address] org and sign in to your [employer] account. Enter I222 in the [employer] box to learn more about \"Prediabetes: Care Instructions. \"     If you do not have an account, please click on the \"Sign Up Now\" link. Current as of: July 25, 2018  Content Version: 12.0  © 3074-5155 [employer]. Care instructions adapted under license by [facility] ([facility]). If you have questions about a medical condition or this instruction, always ask your healthcare professional. Tamia Dollar any warranty or liability for your use of this information. Patient Education        metformin  Pronunciation:  met FOR min  Brand:  Fortamet, Glucophage, Glucophage XR, Glumetza, Riomet  What is the most important information I should know about metformin? You should not use this medicine if you have severe kidney disease, metabolic acidosis, or diabetic ketoacidosis (call your doctor for treatment). If you need to have any type of x-ray or CT scan using a dye that is injected into your veins, you will need to temporarily stop taking metformin. You may develop lactic acidosis, a dangerous build-up of lactic acid in your blood.  Call your doctor or get emergency medical help if you have unusual muscle pain, trouble breathing, stomach pain, dizziness, feeling cold, or feeling very weak or tired. What is metformin? Metformin is used together with diet and exercise to improve blood sugar control in adults with type 2 diabetes mellitus. Metformin is sometimes used together with insulin or other medications, but metformin is not for treating type 1 diabetes. Metformin may also be used for purposes not listed in this medication guide. What should I discuss with my healthcare provider before taking metformin? You should not use metformin if you are allergic to it, or if you have:  · severe kidney disease; or  · metabolic acidosis or diabetic ketoacidosis (call your doctor for treatment). If you need to have surgery or any type of x-ray or CT scan using a dye that is injected into your veins, you will need to temporarily stop taking metformin. Be sure your caregivers know ahead of time that you are using this medication. Tell your doctor if you have ever had:  · kidney disease (your kidney function may need to be checked before you take this medicine);  · high ketone levels in your blood or urine;  · heart disease, congestive heart failure;  · liver disease; or  · if you also use insulin, or other oral diabetes medications. You may develop lactic acidosis, a dangerous build-up of lactic acid in your blood. This may be more likely if you have other medical conditions, a severe infection, chronic alcoholism, or if you are 72 or older. Ask your doctor about your risk. Follow your doctor's instructions about using this medicine if you are pregnant. Blood sugar control is very important during pregnancy, and your dose needs may be different during each trimester of pregnancy. Tell your doctor if you become pregnant while taking metformin. Metformin may stimulate ovulation in a premenopausal woman and may increase the risk of unintended pregnancy. Talk to your doctor about your risk. You should not breast-feed while using this medicine.   Metformin this information.

## 2019-06-19 NOTE — PROGRESS NOTES
Patient ID: Vera Yancey 1960    Chief Complaint   Patient presents with    Diabetes     pre diabetes         HPI     Here for prediabetes:  Has nutrition degree . Exercises often. Keeps carbs low. Does not want to lose weight. Does not eat a lot of sweets but does eat a few slices of bread each day. Review of Systems   Constitutional: Positive for fatigue.        Patient Active Problem List   Diagnosis    Gluten-sensitive enteropathy    Allergic rhinitis    HSV-1 (herpes simplex virus 1) infection    Gastroesophageal reflux disease without esophagitis    Slow transit constipation    ADD (attention deficit disorder)    Family history of osteoporosis    Kyphosis    Impingement syndrome of right shoulder    Right rotator cuff tendinitis    Impaired glucose tolerance       Past Medical History:   Diagnosis Date    ADD (attention deficit disorder)     Celiac disease     Nausea        Past Surgical History:   Procedure Laterality Date    COLONOSCOPY  2008    COLONOSCOPY  07/27/2018    repeat in 10 years    ENDOSCOPY, COLON, DIAGNOSTIC      PATELLA SURGERY Left 2004    SHOULDER SURGERY Right 2015    Fester    TONSILLECTOMY      WISDOM TOOTH EXTRACTION         Family History   Problem Relation Age of Onset    Dementia Mother 79        Picks Disease    Celiac Disease Mother     Osteoporosis Mother     Heart Attack Father 68    Cancer Father         Lymphoma    Thyroid Disease Sister         Graves    Osteoporosis Sister     Thyroid Disease Sister     Celiac Disease Sister     Rheum Arthritis Sister        Current Outpatient Medications on File Prior to Visit   Medication Sig Dispense Refill    atomoxetine (STRATTERA) 40 MG capsule Take 1 capsule by mouth every morning 30 capsule 5    thyroid (ARMOUR THYROID) 15 MG tablet Take 1 tablet by mouth daily      RESTASIS 0.05 % ophthalmic emulsion Apply 1 drop to eye 2 times daily  1    Cholecalciferol (VITAMIN D3 PO) Take by mouth  magnesium 30 MG tablet Take 30 mg by mouth 2 times daily      polyethylene glycol (GLYCOLAX) powder Take 17 g by mouth daily as needed (constipation) 510 g 11    valACYclovir (VALTREX) 1 g tablet 2 TABS BY MOUTH EVERY 12 HOURS HOURS X 1 DAY FOR COLD SORES 12 tablet 2     No current facility-administered medications on file prior to visit. Objective:           Physical Exam   Constitutional: She is oriented to person, place, and time. She appears well-developed and well-nourished. No distress. Neurological: She is oriented to person, place, and time. Cammy Hancock Psychiatric: She has a normal mood and affect. Her speech is normal and behavior is normal. Judgment and thought content normal. She is not actively hallucinating. Cognition and memory are normal. She is attentive. Nursing note and vitals reviewed. Vitals:    06/19/19 0951   BP: 102/64   Site: Right Upper Arm   Position: Sitting   Cuff Size: Small Adult   Pulse: 71   Weight: 115 lb 9.6 oz (52.4 kg)   Height: 5' 3.25\" (1.607 m)     Body mass index is 20.32 kg/m². Wt Readings from Last 3 Encounters:   06/19/19 115 lb 9.6 oz (52.4 kg)   06/13/19 116 lb (52.6 kg)   01/14/19 118 lb 9.6 oz (53.8 kg)     BP Readings from Last 3 Encounters:   06/19/19 102/64   06/13/19 98/62   01/14/19 90/60          No results found for this visit on 06/19/19. Assessment:       Diagnosis Orders   1. Impaired glucose tolerance             Plan:      Patient is aware of being pre-diabetic. This can be considered a warning sign that they could develop diabetes in the future. Patient counseled on lifestyle changes such as 30 minutes of exercise daily or 150 minutes of exercise per week. Healthy food choices were discussed such as increasing the amount of nonstarchy vegetables and decreasing foods high in carbohydrates. Metformin or Precose will not be   added to the medication regimen today.

## 2019-11-18 ENCOUNTER — OFFICE VISIT (OUTPATIENT)
Dept: FAMILY MEDICINE CLINIC | Age: 59
End: 2019-11-18
Payer: COMMERCIAL

## 2019-11-18 VITALS
HEIGHT: 63 IN | BODY MASS INDEX: 20.48 KG/M2 | SYSTOLIC BLOOD PRESSURE: 110 MMHG | DIASTOLIC BLOOD PRESSURE: 68 MMHG | WEIGHT: 115.6 LBS | HEART RATE: 80 BPM

## 2019-11-18 DIAGNOSIS — M25.561 ACUTE PAIN OF RIGHT KNEE: Primary | ICD-10-CM

## 2019-11-18 DIAGNOSIS — M25.551 RIGHT HIP PAIN: ICD-10-CM

## 2019-11-18 DIAGNOSIS — Z23 NEEDS FLU SHOT: ICD-10-CM

## 2019-11-18 PROCEDURE — 90686 IIV4 VACC NO PRSV 0.5 ML IM: CPT | Performed by: FAMILY MEDICINE

## 2019-11-18 PROCEDURE — 99213 OFFICE O/P EST LOW 20 MIN: CPT | Performed by: FAMILY MEDICINE

## 2019-11-18 PROCEDURE — 1036F TOBACCO NON-USER: CPT | Performed by: FAMILY MEDICINE

## 2019-11-18 PROCEDURE — G8482 FLU IMMUNIZE ORDER/ADMIN: HCPCS | Performed by: FAMILY MEDICINE

## 2019-11-18 PROCEDURE — 90471 IMMUNIZATION ADMIN: CPT | Performed by: FAMILY MEDICINE

## 2019-11-18 PROCEDURE — G8420 CALC BMI NORM PARAMETERS: HCPCS | Performed by: FAMILY MEDICINE

## 2019-11-18 PROCEDURE — G8427 DOCREV CUR MEDS BY ELIG CLIN: HCPCS | Performed by: FAMILY MEDICINE

## 2019-11-18 PROCEDURE — 3017F COLORECTAL CA SCREEN DOC REV: CPT | Performed by: FAMILY MEDICINE

## 2019-11-18 RX ORDER — OMEPRAZOLE 40 MG/1
40 CAPSULE, DELAYED RELEASE ORAL DAILY
Qty: 30 CAPSULE | Refills: 2 | Status: SHIPPED | OUTPATIENT
Start: 2019-11-18 | End: 2020-06-19

## 2019-11-18 RX ORDER — POLYETHYLENE GLYCOL 3350 17 G/17G
17 POWDER, FOR SOLUTION ORAL DAILY
COMMUNITY
End: 2022-06-22

## 2019-11-18 RX ORDER — NAPROXEN 500 MG/1
500 TABLET ORAL 2 TIMES DAILY WITH MEALS
Qty: 60 TABLET | Refills: 0 | Status: SHIPPED | OUTPATIENT
Start: 2019-11-18 | End: 2020-06-19

## 2019-11-18 RX ORDER — OMEPRAZOLE 40 MG/1
40 CAPSULE, DELAYED RELEASE ORAL DAILY
Qty: 30 CAPSULE | Refills: 2 | Status: SHIPPED | OUTPATIENT
Start: 2019-11-18 | End: 2019-11-18 | Stop reason: SDUPTHER

## 2019-11-20 ENCOUNTER — HOSPITAL ENCOUNTER (OUTPATIENT)
Age: 59
Discharge: HOME | End: 2019-11-20
Payer: COMMERCIAL

## 2019-11-20 ENCOUNTER — HOSPITAL ENCOUNTER (OUTPATIENT)
Dept: GENERAL RADIOLOGY | Age: 59
Discharge: HOME | End: 2019-11-20
Payer: COMMERCIAL

## 2019-11-20 ENCOUNTER — HOSPITAL ENCOUNTER (OUTPATIENT)
Dept: GENERAL RADIOLOGY | Age: 59
End: 2019-11-20
Payer: COMMERCIAL

## 2019-11-20 DIAGNOSIS — M25.561 ACUTE PAIN OF RIGHT KNEE: ICD-10-CM

## 2019-11-20 DIAGNOSIS — G89.29 HIP PAIN, CHRONIC, RIGHT: ICD-10-CM

## 2019-11-20 DIAGNOSIS — M54.50 LUMBAR PAIN: ICD-10-CM

## 2019-11-20 DIAGNOSIS — M25.551 RIGHT HIP PAIN: ICD-10-CM

## 2019-11-20 DIAGNOSIS — M25.551 HIP PAIN, CHRONIC, RIGHT: ICD-10-CM

## 2019-11-20 DIAGNOSIS — G89.29 CHRONIC PAIN OF RIGHT KNEE: ICD-10-CM

## 2019-11-20 DIAGNOSIS — M25.561 CHRONIC PAIN OF RIGHT KNEE: ICD-10-CM

## 2019-11-20 PROCEDURE — 72100 X-RAY EXAM L-S SPINE 2/3 VWS: CPT

## 2019-11-20 PROCEDURE — 73560 X-RAY EXAM OF KNEE 1 OR 2: CPT

## 2019-11-20 PROCEDURE — 73501 X-RAY EXAM HIP UNI 1 VIEW: CPT

## 2019-11-21 PROBLEM — M47.816 OSTEOARTHRITIS OF LUMBAR SPINE: Status: ACTIVE | Noted: 2019-11-21

## 2019-11-25 ENCOUNTER — TELEPHONE (OUTPATIENT)
Dept: FAMILY MEDICINE CLINIC | Age: 59
End: 2019-11-25

## 2019-11-25 ENCOUNTER — OFFICE VISIT (OUTPATIENT)
Dept: FAMILY MEDICINE CLINIC | Age: 59
End: 2019-11-25
Payer: COMMERCIAL

## 2019-11-25 VITALS — DIASTOLIC BLOOD PRESSURE: 60 MMHG | SYSTOLIC BLOOD PRESSURE: 90 MMHG | HEART RATE: 78 BPM

## 2019-11-25 DIAGNOSIS — L85.3 DRY SKIN DERMATITIS: ICD-10-CM

## 2019-11-25 DIAGNOSIS — M25.561 ACUTE PAIN OF RIGHT KNEE: Primary | ICD-10-CM

## 2019-11-25 PROCEDURE — G8427 DOCREV CUR MEDS BY ELIG CLIN: HCPCS | Performed by: FAMILY MEDICINE

## 2019-11-25 PROCEDURE — G8482 FLU IMMUNIZE ORDER/ADMIN: HCPCS | Performed by: FAMILY MEDICINE

## 2019-11-25 PROCEDURE — 1036F TOBACCO NON-USER: CPT | Performed by: FAMILY MEDICINE

## 2019-11-25 PROCEDURE — 3017F COLORECTAL CA SCREEN DOC REV: CPT | Performed by: FAMILY MEDICINE

## 2019-11-25 PROCEDURE — G8420 CALC BMI NORM PARAMETERS: HCPCS | Performed by: FAMILY MEDICINE

## 2019-11-25 PROCEDURE — 20610 DRAIN/INJ JOINT/BURSA W/O US: CPT | Performed by: FAMILY MEDICINE

## 2019-11-25 PROCEDURE — 99213 OFFICE O/P EST LOW 20 MIN: CPT | Performed by: FAMILY MEDICINE

## 2019-11-25 RX ORDER — TRIAMCINOLONE ACETONIDE 40 MG/ML
80 INJECTION, SUSPENSION INTRA-ARTICULAR; INTRAMUSCULAR ONCE
Status: COMPLETED | OUTPATIENT
Start: 2019-11-25 | End: 2019-11-25

## 2019-11-25 RX ORDER — ATOMOXETINE 25 MG/1
50 CAPSULE ORAL DAILY
Qty: 60 CAPSULE | Refills: 1 | Status: SHIPPED | OUTPATIENT
Start: 2019-11-25 | End: 2020-01-13 | Stop reason: SDUPTHER

## 2019-11-25 RX ADMIN — TRIAMCINOLONE ACETONIDE 80 MG: 40 INJECTION, SUSPENSION INTRA-ARTICULAR; INTRAMUSCULAR at 17:01

## 2019-11-27 ENCOUNTER — TELEPHONE (OUTPATIENT)
Dept: FAMILY MEDICINE CLINIC | Age: 59
End: 2019-11-27

## 2019-12-21 DIAGNOSIS — B00.9 HSV-1 (HERPES SIMPLEX VIRUS 1) INFECTION: ICD-10-CM

## 2019-12-23 RX ORDER — VALACYCLOVIR HYDROCHLORIDE 1 G/1
TABLET, FILM COATED ORAL
Qty: 12 TABLET | Refills: 2 | Status: SHIPPED | OUTPATIENT
Start: 2019-12-23 | End: 2021-05-03

## 2020-01-13 ENCOUNTER — OFFICE VISIT (OUTPATIENT)
Dept: FAMILY MEDICINE CLINIC | Age: 60
End: 2020-01-13
Payer: COMMERCIAL

## 2020-01-13 VITALS
SYSTOLIC BLOOD PRESSURE: 122 MMHG | DIASTOLIC BLOOD PRESSURE: 72 MMHG | HEART RATE: 72 BPM | HEIGHT: 63 IN | BODY MASS INDEX: 20.2 KG/M2 | WEIGHT: 114 LBS

## 2020-01-13 PROCEDURE — G8428 CUR MEDS NOT DOCUMENT: HCPCS | Performed by: FAMILY MEDICINE

## 2020-01-13 PROCEDURE — G8482 FLU IMMUNIZE ORDER/ADMIN: HCPCS | Performed by: FAMILY MEDICINE

## 2020-01-13 PROCEDURE — 1036F TOBACCO NON-USER: CPT | Performed by: FAMILY MEDICINE

## 2020-01-13 PROCEDURE — 99213 OFFICE O/P EST LOW 20 MIN: CPT | Performed by: FAMILY MEDICINE

## 2020-01-13 PROCEDURE — G8420 CALC BMI NORM PARAMETERS: HCPCS | Performed by: FAMILY MEDICINE

## 2020-01-13 PROCEDURE — 3017F COLORECTAL CA SCREEN DOC REV: CPT | Performed by: FAMILY MEDICINE

## 2020-01-13 RX ORDER — ATOMOXETINE 25 MG/1
50 CAPSULE ORAL DAILY
Qty: 30 CAPSULE | Refills: 5 | Status: SHIPPED | OUTPATIENT
Start: 2020-01-13 | End: 2020-06-19 | Stop reason: SDUPTHER

## 2020-01-13 ASSESSMENT — PATIENT HEALTH QUESTIONNAIRE - PHQ9
SUM OF ALL RESPONSES TO PHQ QUESTIONS 1-9: 2
1. LITTLE INTEREST OR PLEASURE IN DOING THINGS: 1
SUM OF ALL RESPONSES TO PHQ QUESTIONS 1-9: 2
2. FEELING DOWN, DEPRESSED OR HOPELESS: 1
SUM OF ALL RESPONSES TO PHQ9 QUESTIONS 1 & 2: 2

## 2020-01-13 NOTE — PATIENT INSTRUCTIONS
The diagnoses and medications listed in this after visit summary may not be accurate at the time of check out. Please check MY CHART in 28-48 hours for possible corrections. Late cancellation policy: So that we can better accommodate people who are sick, please give our office 24 hour notice for an appointment cancellation. Thank you. Missed appointments: Your care is very important to us. It is important that you keep your scheduled appointments. Multiple missed appointments may lead to a dismissal from the office. Later arrival policy: If you are more than 10 minutes late for your appointment, you will be asked to reschedule. Please allow 5-7 business days for paperwork to be processed. It is important that you check your MY Chart messages, as they include appointment reminders, test results, and other important information. If you have forgotten your password, please call [phone].

## 2020-01-13 NOTE — PROGRESS NOTES
Patient ID: Francia Batista 1960    Chief Complaint   Patient presents with    Knee Pain     right knee feel it going up and down steps -very difficulty, difficulty to walk on tread mill. 7-8/10 with movement         Knee Pain    The incident occurred more than 1 week ago. There was no injury mechanism. The pain is present in the right knee. The pain is moderate. The symptoms are aggravated by weight bearing (going up and down the steps). Treatments tried: cortisone injection in knee last month. The treatment provided no relief. ADD: Strattera working well for the ADD. Constipation: Takes MiraLAX but does not want to have to pay for it. Has occasional constipation. Review of Systems   Musculoskeletal: Positive for arthralgias and gait problem.        Patient Active Problem List   Diagnosis    Gluten-sensitive enteropathy    Allergic rhinitis    HSV-1 (herpes simplex virus 1) infection    Gastroesophageal reflux disease without esophagitis    Slow transit constipation    ADD (attention deficit disorder)    Family history of osteoporosis    Kyphosis    Impingement syndrome of right shoulder    Right rotator cuff tendinitis    Impaired glucose tolerance    Osteoarthritis of lumbar spine       Past Surgical History:   Procedure Laterality Date    COLONOSCOPY  2008    COLONOSCOPY  07/27/2018    repeat in 10 years    ENDOSCOPY, COLON, DIAGNOSTIC      PATELLA SURGERY Left 2004    SHOULDER SURGERY Right 2015    Fester    TONSILLECTOMY      WISDOM TOOTH EXTRACTION         Family History   Problem Relation Age of Onset    Dementia Mother 79        Picks Disease    Celiac Disease Mother     Osteoporosis Mother     Heart Attack Father 68    Cancer Father         Lymphoma    Thyroid Disease Sister         Graves    Osteoporosis Sister     Thyroid Disease Sister     Celiac Disease Sister     Rheum Arthritis Sister        Current Outpatient Medications on File Prior to Visit Medication Sig Dispense Refill    valACYclovir (VALTREX) 1 g tablet TAKE 2 TABS BY MOUTH EVERY 12 HOURS HOURS X 1 DAY FOR COLD SORES 12 tablet 2    polyethylene glycol (GLYCOLAX) powder Take 17 g by mouth daily      thyroid (ARMOUR THYROID) 15 MG tablet Take 1 tablet by mouth daily      RESTASIS 0.05 % ophthalmic emulsion Apply 1 drop to eye 2 times daily  1    Cholecalciferol (VITAMIN D3 PO) Take by mouth      magnesium 30 MG tablet Take 30 mg by mouth 2 times daily      naproxen (NAPROSYN) 500 MG tablet Take 1 tablet by mouth 2 times daily (with meals) (Patient not taking: Reported on 1/13/2020) 60 tablet 0    omeprazole (PRILOSEC) 40 MG delayed release capsule Take 1 capsule by mouth daily (Patient not taking: Reported on 1/13/2020) 30 capsule 2    atomoxetine (STRATTERA) 40 MG capsule Take 1 capsule by mouth every morning (Patient not taking: Reported on 1/13/2020) 30 capsule 5     No current facility-administered medications on file prior to visit. Objective:           Physical Exam  Vitals signs and nursing note reviewed. Constitutional:       Appearance: She is well-developed. HENT:      Head: Normocephalic and atraumatic. Neck:      Musculoskeletal: Neck supple. Thyroid: No thyromegaly. Trachea: No tracheal deviation. Cardiovascular:      Rate and Rhythm: Normal rate and regular rhythm. Heart sounds: Normal heart sounds. Pulmonary:      Effort: Pulmonary effort is normal. No respiratory distress. Breath sounds: Normal breath sounds. No wheezing. Abdominal:      General: There is no distension. Palpations: Abdomen is soft. There is no mass. Tenderness: There is no tenderness. Skin:     General: Skin is warm and dry. Neurological:      Mental Status: She is alert.        Vitals:    01/13/20 1548   BP: 122/72   Site: Left Upper Arm   Position: Sitting   Cuff Size: Medium Adult   Pulse: 72   Weight: 114 lb (51.7 kg)   Height: 5' 3.25\" (1.607 m)     Body mass index is 20.03 kg/m². Wt Readings from Last 3 Encounters:   01/13/20 114 lb (51.7 kg)   11/18/19 115 lb 9.6 oz (52.4 kg)   06/19/19 115 lb 9.6 oz (52.4 kg)     BP Readings from Last 3 Encounters:   01/13/20 122/72   11/25/19 90/60   11/18/19 110/68          No results found for this visit on 01/13/20. Assessment:       Diagnosis Orders   1. Chronic pain of right knee  Polly Sandoval MD, Orthopedic Surgery, [place]   2. Acute pain of right knee     3. Attention deficit disorder (ADD) without hyperactivity  atomoxetine (STRATTERA) 25 MG capsule   4. Constipation, unspecified constipation type             Plan:      See orders    Try fiber supplement such as Metamucil.

## 2020-05-28 ENCOUNTER — TELEPHONE (OUTPATIENT)
Dept: FAMILY MEDICINE CLINIC | Age: 60
End: 2020-05-28

## 2020-05-28 NOTE — TELEPHONE ENCOUNTER
Patient left ear feels like there is something in the ear x 1 wk. No pain or drainage.  What type of appointment

## 2020-06-16 ENCOUNTER — TELEPHONE (OUTPATIENT)
Dept: FAMILY MEDICINE CLINIC | Age: 60
End: 2020-06-16

## 2020-06-19 ENCOUNTER — TELEPHONE (OUTPATIENT)
Dept: FAMILY MEDICINE CLINIC | Age: 60
End: 2020-06-19

## 2020-06-19 ENCOUNTER — VIRTUAL VISIT (OUTPATIENT)
Dept: FAMILY MEDICINE CLINIC | Age: 60
End: 2020-06-19
Payer: COMMERCIAL

## 2020-06-19 PROCEDURE — G8428 CUR MEDS NOT DOCUMENT: HCPCS | Performed by: FAMILY MEDICINE

## 2020-06-19 PROCEDURE — 99213 OFFICE O/P EST LOW 20 MIN: CPT | Performed by: FAMILY MEDICINE

## 2020-06-19 PROCEDURE — 3017F COLORECTAL CA SCREEN DOC REV: CPT | Performed by: FAMILY MEDICINE

## 2020-06-19 RX ORDER — ATOMOXETINE 25 MG/1
50 CAPSULE ORAL DAILY
Qty: 30 CAPSULE | Refills: 5 | Status: SHIPPED | OUTPATIENT
Start: 2020-06-19 | End: 2020-06-19 | Stop reason: SDUPTHER

## 2020-06-19 RX ORDER — ATOMOXETINE 25 MG/1
50 CAPSULE ORAL DAILY
Qty: 60 CAPSULE | Refills: 5 | Status: SHIPPED | OUTPATIENT
Start: 2020-06-19 | End: 2020-07-09

## 2020-06-19 ASSESSMENT — ENCOUNTER SYMPTOMS: BACK PAIN: 1

## 2020-06-19 NOTE — TELEPHONE ENCOUNTER
Pharmacy called to verify about the atomoxetine 25mg directions says take two capsule daily qty 30. Please clarify if the disp is 30 or 60. Please send new script.

## 2020-06-19 NOTE — PROGRESS NOTES
physical exam findings-      Diagnosis Orders   1. Attention deficit disorder (ADD) without hyperactivity  atomoxetine (STRATTERA) 25 MG capsule   2. Encounter for screening mammogram for malignant neoplasm of breast  ROMAINE Screening Bilateral   3. Chronic pain of right knee       ADD stable with current medication. Continue taking    Phone number given to ortho office    Re-check in 6 months for physical  Return in about 6 months (around 12/17/2020) for Physical, Fasting. Deni Vivas is a 60 y.o. female being evaluated by a Virtual Visit (video visit) encounter to address concerns as mentioned above. A caregiver was present when appropriate. Due to this being a TeleHealth encounter (During IMYEJ-25 public health emergency), evaluation of the following organ systems was limited: Vitals/Constitutional/EENT/Resp/CV/GI//MS/Neuro/Skin/Heme-Lymph-Imm. Pursuant to the emergency declaration under the [address] [place] [address] and the [employer] Act, this Virtual Visit was conducted with patient's (and/or legal guardian's) consent, to reduce the patient's risk of exposure to COVID-19 and provide necessary medical care. The patient (and/or legal guardian) has also been advised to contact this office for worsening conditions or problems, and seek emergency medical treatment and/or call 911 if deemed necessary. Patient identification was verified at the start of the visit: Yes    Total time spent on this encounter: Not billed by time    Services were provided through a video synchronous discussion virtually to substitute for in-person clinic visit. Patient and provider were located at their individual homes. --Regis Rocha MD on 6/19/2020 at 8:45 AM    An electronic signature was used to authenticate this note.

## 2020-06-29 ENCOUNTER — HOSPITAL ENCOUNTER (OUTPATIENT)
Dept: WOMENS IMAGING | Age: 60
Discharge: HOME | End: 2020-06-29
Payer: COMMERCIAL

## 2020-06-29 PROCEDURE — 77063 BREAST TOMOSYNTHESIS BI: CPT

## 2020-07-09 ENCOUNTER — OFFICE VISIT (OUTPATIENT)
Dept: ORTHOPEDIC SURGERY | Age: 60
End: 2020-07-09
Payer: COMMERCIAL

## 2020-07-09 VITALS — RESPIRATION RATE: 14 BRPM | BODY MASS INDEX: 20.73 KG/M2 | HEIGHT: 63 IN | WEIGHT: 117 LBS

## 2020-07-09 PROCEDURE — G8420 CALC BMI NORM PARAMETERS: HCPCS | Performed by: ORTHOPAEDIC SURGERY

## 2020-07-09 PROCEDURE — 99203 OFFICE O/P NEW LOW 30 MIN: CPT | Performed by: ORTHOPAEDIC SURGERY

## 2020-07-09 PROCEDURE — 3017F COLORECTAL CA SCREEN DOC REV: CPT | Performed by: ORTHOPAEDIC SURGERY

## 2020-07-09 PROCEDURE — 1036F TOBACCO NON-USER: CPT | Performed by: ORTHOPAEDIC SURGERY

## 2020-07-09 PROCEDURE — G8427 DOCREV CUR MEDS BY ELIG CLIN: HCPCS | Performed by: ORTHOPAEDIC SURGERY

## 2020-07-09 NOTE — PROGRESS NOTES
Patient here for a new patient consult per Shanon Cadena MD for evaluation of right knee pain. This is a chronic issue and is persistent despite a steroid injection treated per her pcp on 11/25/19, she uses an OTC brace, takes Ibuprofen PRN and uses Biofreeze with some relief but is wanting a long-term resolution. She reports no prior surgeries on this knee.     Provider needs to conduct a hands on physical today due to patients injury    XR RIGHT KNEE 11/20/19  Impression   Mild degenerative changes of the right knee.  No acute osseous abnormality.

## 2020-07-09 NOTE — PATIENT INSTRUCTIONS
MRI right knee   Weightbearing as tolerated   Follow up once MRI is complete     Central scheduling [phone]

## 2020-07-10 ASSESSMENT — ENCOUNTER SYMPTOMS
COLOR CHANGE: 0
EYE REDNESS: 0
CHEST TIGHTNESS: 0
SHORTNESS OF BREATH: 0
BACK PAIN: 1
VOMITING: 0
EYE PAIN: 0
WHEEZING: 0

## 2020-07-10 NOTE — PROGRESS NOTES
Arthritis Sister      Social History     Socioeconomic History    Marital status:      Spouse name: None    Number of children: None    Years of education: None    Highest education level: None   Occupational History    Occupation: OT     Comment: [employer]   Social Needs    Financial resource strain: None    Food insecurity     Worry: None     Inability: None    Transportation needs     Medical: None     Non-medical: None   Tobacco Use    Smoking status: Never Smoker    Smokeless tobacco: Never Used   Substance and Sexual Activity    Alcohol use:  Yes     Alcohol/week: 0.0 standard drinks     Comment: occas    Drug use: No    Sexual activity: Not Currently   Lifestyle    Physical activity     Days per week: None     Minutes per session: None    Stress: None   Relationships    Social connections     Talks on phone: None     Gets together: None     Attends Church service: None     Active member of club or organization: None     Attends meetings of clubs or organizations: None     Relationship status: None    Intimate partner violence     Fear of current or ex partner: None     Emotionally abused: None     Physically abused: None     Forced sexual activity: None   Other Topics Concern    None   Social History Narrative    None     Current Outpatient Medications   Medication Sig Dispense Refill    valACYclovir (VALTREX) 1 g tablet TAKE 2 TABS BY MOUTH EVERY 12 HOURS HOURS X 1 DAY FOR COLD SORES 12 tablet 2    polyethylene glycol (GLYCOLAX) powder Take 17 g by mouth daily      atomoxetine (STRATTERA) 40 MG capsule Take 1 capsule by mouth every morning 30 capsule 5    thyroid (ARMOUR THYROID) 15 MG tablet Take 1 tablet by mouth daily      RESTASIS 0.05 % ophthalmic emulsion Apply 1 drop to eye 2 times daily  1    Cholecalciferol (VITAMIN D3 PO) Take by mouth      magnesium 30 MG tablet Take 30 mg by mouth 2 times daily       No current facility-administered medications for this visit. Allergies   Allergen Reactions    Gluten Meal     Seasonal        Review of Systems:   Review of Systems   Constitutional: Negative for chills and fever. HENT: Negative for congestion and sneezing. Eyes: Negative for pain and redness. Respiratory: Negative for chest tightness, shortness of breath and wheezing. Cardiovascular: Negative for chest pain and palpitations. Gastrointestinal: Negative for vomiting. Musculoskeletal: Positive for arthralgias, back pain and myalgias. Skin: Negative for color change and rash. Psychiatric/Behavioral: Negative for agitation. The patient is not nervous/anxious. Examination:  General Exam:  Vitals: Resp 14   Ht 5' 3\" (1.6 m)   Wt 117 lb (53.1 kg)   LMP 11/09/2011 (Exact Date)   BMI 20.73 kg/m²    Physical Exam  Vitals signs and nursing note reviewed. Constitutional:       Appearance: Normal appearance. HENT:      Head: Normocephalic and atraumatic. Eyes:      Conjunctiva/sclera: Conjunctivae normal.      Pupils: Pupils are equal, round, and reactive to light. Neck:      Musculoskeletal: Normal range of motion. Pulmonary:      Effort: Pulmonary effort is normal.   Musculoskeletal:      Right hip: She exhibits normal range of motion, normal strength, no tenderness, no bony tenderness, no swelling, no crepitus and no deformity. Left hip: Normal.      Left knee: She exhibits normal range of motion, no swelling, no effusion, no ecchymosis, no deformity, no laceration, normal alignment, no LCL laxity, normal meniscus and no MCL laxity. No tenderness found. No medial joint line and no lateral joint line tenderness noted. Comments: Right Lower Extremity:    There is mild diffuse tenderness to palpation throughout the knee maximally along the medial joint line. There is mild global swelling anteriorly at the knee with no obvious effusion.   There is 5 out of 5 strength with knee flexion and extension. Sensation is intact throughout the lower extremity. There is full range of motion at the knee with discomfort at the extremes of motion, especially terminal flexion. No instability with varus or valgus stress testing or anterior/posterior drawer testing. Medial Renato's test produces mild pain but no palpable click. Skin is intact. Normal knee alignment. Pulses intact. Skin:     General: Skin is warm and dry. Neurological:      Mental Status: She is alert and oriented to person, place, and time. Psychiatric:         Mood and Affect: Mood normal.         Behavior: Behavior normal.            Diagnostic testing:  X-rays reviewed in office, I independently reviewed the films in the office today:        XR RIGHT KNEE 11/20/19  Impression   Mild degenerative changes of the right knee.  No acute osseous abnormality. Mild spurring and joint space narrowing along the medial compartment. Office Procedures:  Orders Placed This Encounter   Procedures    MRI KNEE RIGHT WO CONTRAST     Standing Status:   Future     Standing Expiration Date:   7/9/2021     Order Specific Question:   Reason for exam:     Answer:   possible loose body       Assessment and Plan  1. Right knee mild primary osteoarthritis, possible medial meniscus tear or intra-articular loose body    I reviewed the x-rays with the patient explained that I do appreciate some mild degenerative changes at the knee but it is inconclusive for identifying a loose body. She was previously told that she may have a loose body on the x-ray. Given her persistent ongoing pain that has not responded to her conservative measures, I have recommended that we proceed with an MRI to evaluate for intra-articular pathology such as a meniscus tear or intra-articular loose body. She will follow-up after the MRI is complete for a review of the results.     Electronically signed by Velia Villagran MD on 7/10/2020 at 7:48 AM

## 2020-07-18 ENCOUNTER — HOSPITAL ENCOUNTER (OUTPATIENT)
Dept: MRI IMAGING | Age: 60
Discharge: HOME | End: 2020-07-18
Payer: COMMERCIAL

## 2020-07-18 PROCEDURE — 73721 MRI JNT OF LWR EXTRE W/O DYE: CPT

## 2020-07-21 ENCOUNTER — HOSPITAL ENCOUNTER (EMERGENCY)
Age: 60
Discharge: HOME | End: 2020-07-21
Attending: EMERGENCY MEDICINE
Payer: COMMERCIAL

## 2020-07-21 VITALS
TEMPERATURE: 98.3 F | SYSTOLIC BLOOD PRESSURE: 123 MMHG | DIASTOLIC BLOOD PRESSURE: 74 MMHG | WEIGHT: 117 LBS | HEIGHT: 64 IN | RESPIRATION RATE: 18 BRPM | HEART RATE: 72 BPM | OXYGEN SATURATION: 98 % | BODY MASS INDEX: 19.97 KG/M2

## 2020-07-21 PROCEDURE — 99283 EMERGENCY DEPT VISIT LOW MDM: CPT

## 2020-07-21 RX ORDER — TETRACAINE HYDROCHLORIDE 5 MG/ML
2 SOLUTION OPHTHALMIC SEE ADMIN INSTRUCTIONS
Status: DISCONTINUED | OUTPATIENT
Start: 2020-07-21 | End: 2020-07-21 | Stop reason: HOSPADM

## 2020-07-21 ASSESSMENT — VISUAL ACUITY
OD: 20/40
OS: 20/40
OU: 20/20

## 2020-07-21 NOTE — ED PROVIDER NOTES
phone: None     Gets together: None     Attends [employer] service: None     Active member of club or organization: None     Attends meetings of clubs or organizations: None     Relationship status: None    Intimate partner violence     Fear of current or ex partner: None     Emotionally abused: None     Physically abused: None     Forced sexual activity: None   Other Topics Concern    None   Social History Narrative    None     Family History   Problem Relation Age of Onset    Dementia Mother 79        Picks Disease    Celiac Disease Mother     Osteoporosis Mother     Heart Attack Father 68    Cancer Father         Lymphoma    Thyroid Disease Sister         Graves    Osteoporosis Sister     Thyroid Disease Sister     Celiac Disease Sister     Rheum Arthritis Sister        PHYSICAL EXAM    VITAL SIGNS: /74   Pulse 72   Temp 98.3 °F (36.8 °C)   Resp 18   Ht 5' 3.5\" (1.613 m)   Wt 117 lb (53.1 kg)   LMP 11/09/2011 (Exact Date)   SpO2 98%   BMI 20.40 kg/m²   Constitutional:  Well developed, well nourished, no acute distress,     Eyes:  Pupils equally round and reactive to light, sclerae nonicteric, conjunctiva moist  Bilateral conjunctiva are not injected and are without obvious corneal deficit, hypopyon, hyphema. No chemosis. EOMI. Funduscopic exam reveals red reflex intact with no obvious retinal abnormalities. No foreign bodies or obvious corneal deficit. No hyphema seen on tangential light    HENT:  Atraumatic, external ears normal, nose normal, oropharynx moist, no pharyngeal exudates. Neck/Lymphatics: supple, no JVD, no swollen nodes   Respiratory:  No retractions  Cardiovascular:  No JVD  Integument:  Warm dry skin, no obvious rash, no evidence of Lee sign  Neurologic:  No slurred speech, normal gait. Cranial nerves II through XII intact.       Visual acuity:  See nurse's note  OD: 20/40  OS: 20/40  OU: 20/20      RADIOLOGY/PROCEDURES      Slit Lamp Exam Procedure Note    Indication: Intermittent visual disturbance of right eye  Procedure: The patient was placed in the appropriate position. Anesthesia was tetracaine. No eyelid abnormality or foreign body on lid eversion. No obvious corneal, sclera, iris defects on slit lamp visualization No cells/flare. No hyphema. Fluorescein staining was performed and revealed no fluorescein uptake. Negative Lety sign. The patient tolerated the procedure well. Complications: None          ED COURSE & MEDICAL DECISION MAKING       Vital signs and nursing notes reviewed during ED course. Patient care and presentation staffed and seen in conjunction with supervising physician, Dr. Myrtle Bahena. Patient presents as above which prompted work-up today. Sent lamp examination is negative. Patient has no right eye pain. No current eye symptoms. Vision intact. Emergent processes considered. Supervising physician did perform ultrasounds of right eye in the ED with questionable small retinal tear. I consulted on-call ophthalmologist as patient has no preference and discussed patient's case with Dr. Keyon Gordon. He recommends patient be sent directly to his office for further evaluation and care. Patient is nontoxic-appearing. Vital signs are stable. Patient stable for outpatient management will be sent directly to ophthalmologist office for further evaluation and care. All pertinent Lab data and radiographic results reviewed with patient at bedside. The patient and/or the family were informed of the results of any tests/labs/imaging, the treatment plan, and time was allotted to answer questions. Diagnosis, disposition, and treatment plan reviewed in detail with patient. Patient understands and agrees to proceed directly to ophthalmologist office straight from the ED and not to go elsewhere or stop along the way. She voices understanding.   Patient understands and agrees to return to emergency department for any new or worsening symptoms - strict return precautions given. Clinical  IMPRESSION    1. Visual disturbance of one eye        Disposition referral (if applicable):  MD Donna Mayorgalucas [place] [address]  [phone]    Go to   [address] [place] [address] OFFICE FOR EVALUATION- DO NOT GO ANYWHERE ELSE OR STOP [address] [facility] Emergency Department  [place] [id number] [phone]  Go to   Return to ED if symptoms worsen or new symptoms      Disposition medications (if applicable):  New Prescriptions    No medications on file         Comment: Please note this report has been produced using speech recognition software and may contain errors related to that system including errors in grammar, punctuation, and spelling, as well as words and phrases that may be inappropriate. If there are any questions or concerns please feel free to contact the dictating provider for clarification.         Becka Newton PA-C  07/21/20 [id number]

## 2020-07-21 NOTE — ED PROVIDER NOTES
I independently examined and evaluated Lina Gupta. In brief, 50-year-old female with right eye floaters and occasional flashing in her vision, no loss of vision. No pain, no tearing, no redness. Had what last week and went away and then happened again this week, is concern for retinal detachment because 2 of her cousins have had that 1 of them had it last week. Focused exam revealed female no acute distress, regular rate and rhythm, nonlabored respirations. And soft and nondistended. No peripheral edema. No periorbital swelling, pupils equal and reactive, no tearing, no redness noted. No chemosis. No hyphema or hypopyon. .    ED course: Slit-lamp exam and exam otherwise are unremarkable, ultrasound with questionable small retinal tear, we will consult ophthalmology and have her go to their office for evaluation    Point of care limited Ocular exam  Procedure note:  Indication:  vision change    Billable study: yes    Views:  Right eye transverse: Adequate    Images obtained with findings:   Right eye retinal Contour: possible small detachment  Right eye vitreous body: anechoic yes, hyperechoic density no    Impression:   Sonographic evidence of retinal detachment: possible small detachment  Vitreous hemorrhage: Absent      Images obtained by Dr. Cecilia Gallardo, interpreted by Dr. Cecilia Gallardo. Images were saved to ultrasound machine. All diagnostic, treatment, and disposition decisions were made by myself in conjunction with the advanced practice provider. For all further details of the patient's emergency department visit, please see the advanced practice provider's documentation. Comment: Please note this report has been produced using speech recognition software and may contain errors related to that system including errors in grammar, punctuation, and spelling, as well as words and phrases that may be inappropriate.  If there are any questions or concerns please feel free to contact the dictating provider for clarification.         Nancy Schmitz MD  07/21/20 1986

## 2020-08-13 ENCOUNTER — OFFICE VISIT (OUTPATIENT)
Dept: ORTHOPEDIC SURGERY | Age: 60
End: 2020-08-13
Payer: COMMERCIAL

## 2020-08-13 ENCOUNTER — TELEPHONE (OUTPATIENT)
Dept: ORTHOPEDIC SURGERY | Age: 60
End: 2020-08-13

## 2020-08-13 VITALS
WEIGHT: 117 LBS | OXYGEN SATURATION: 96 % | HEIGHT: 63 IN | HEART RATE: 86 BPM | RESPIRATION RATE: 15 BRPM | BODY MASS INDEX: 20.73 KG/M2

## 2020-08-13 PROCEDURE — G8427 DOCREV CUR MEDS BY ELIG CLIN: HCPCS | Performed by: ORTHOPAEDIC SURGERY

## 2020-08-13 PROCEDURE — G8420 CALC BMI NORM PARAMETERS: HCPCS | Performed by: ORTHOPAEDIC SURGERY

## 2020-08-13 PROCEDURE — 99214 OFFICE O/P EST MOD 30 MIN: CPT | Performed by: ORTHOPAEDIC SURGERY

## 2020-08-13 PROCEDURE — 1036F TOBACCO NON-USER: CPT | Performed by: ORTHOPAEDIC SURGERY

## 2020-08-13 PROCEDURE — 3017F COLORECTAL CA SCREEN DOC REV: CPT | Performed by: ORTHOPAEDIC SURGERY

## 2020-08-13 RX ORDER — MAGNESIUM GLUCONATE 30 MG(550)
30 TABLET ORAL
COMMUNITY
End: 2021-05-03

## 2020-08-13 ASSESSMENT — ENCOUNTER SYMPTOMS
VOMITING: 0
EYE PAIN: 0
SHORTNESS OF BREATH: 0
COLOR CHANGE: 0
WHEEZING: 0
CHEST TIGHTNESS: 0
EYE REDNESS: 0

## 2020-08-13 NOTE — PROGRESS NOTES
Patient returns today for MRI results of the right knee. Patient states no changes in her history since last visit. Patient states that she is in constat pain will increase with activity. Denies any new injury or accident. MRI of the right knee completed on 7/18/20     Impression    1. Mild meniscal degeneration.  No evidence of acute meniscal tear. 2. No evidence of acute ligament tear. 3. Moderate patellofemoral compartment osteoarthritis, with focal    full-thickness chondromalacia at the apex of the patella, along with    subchondral cystic change and reactive subchondral marrow edema.  Small    intra-articular loose bodies present posteriorly at the level of the joint    line, measuring approximately 3 mm.     4. Mild medial compartment degenerative changes.  Small joint effusion and    leaking Baker's cyst.    5. Mild quadriceps tendinopathy.

## 2020-08-13 NOTE — TELEPHONE ENCOUNTER
Dangelo Cruz from [facility] [place] 2[place] not required for  Gelsyn 3 injection.   Dx M17.11 Right Knee  CPT  GELSYN 3  Ref # [id number]  Please Order Medication

## 2020-08-13 NOTE — PATIENT INSTRUCTIONS
Continue weight-bearing as tolerated. Continue range of motion exercises as instructed. Ice and elevate as needed. Tylenol or Motrin for pain.   Get gel injections approved   Once we get them approved we will call you to schedule an apt

## 2020-08-13 NOTE — TELEPHONE ENCOUNTER
[facility] called patient to informed patient about approval. Vicki Damon still have to order the medication.  DO NOT SCHEDULE

## 2020-08-13 NOTE — PROGRESS NOTES
8/13/2020   Chief Complaint   Patient presents with    Knee Pain     right knee MRI completed         History of Present Illness:                             Rohit Carnes is a 60 y.o. female who returns today for follow-up of her right knee. Her symptoms are unchanged. She continues to have soreness and aching pain at the anterior aspect of her knee worse with deep flexion or repetitive walking or climbing stairs. She denies any instability events. She has some mild occasional swelling but denies any loss of range of motion. Patient returns today for MRI results of the right knee. Patient states no changes in her history since last visit. Patient states that she is in constat pain will increase with activity. Denies any new injury or accident.            Medical History  Patient's medications, allergies, past medical, surgical, social and family histories were reviewed and updated as appropriate. Review of Systems:   Review of Systems   Constitutional: Negative for chills and fever. HENT: Negative for congestion and sneezing. Eyes: Negative for pain and redness. Respiratory: Negative for chest tightness, shortness of breath and wheezing. Cardiovascular: Negative for chest pain and palpitations. Gastrointestinal: Negative for vomiting. Musculoskeletal: Positive for arthralgias. Skin: Negative for color change and rash. Psychiatric/Behavioral: Negative for agitation. The patient is not nervous/anxious. Examination:  General Exam:  Vitals: Pulse 86   Resp 15   Ht 5' 3\" (1.6 m)   Wt 117 lb (53.1 kg)   LMP 11/09/2011 (Exact Date)   SpO2 96%   BMI 20.73 kg/m²    Physical Exam  Vitals signs and nursing note reviewed. Constitutional:       Appearance: Normal appearance. HENT:      Head: Normocephalic and atraumatic. Eyes:      Conjunctiva/sclera: Conjunctivae normal.      Pupils: Pupils are equal, round, and reactive to light. Neck:      Musculoskeletal: Normal range of motion. Pulmonary:      Effort: Pulmonary effort is normal.   Musculoskeletal:      Right hip: She exhibits normal range of motion, normal strength, no tenderness, no bony tenderness, no swelling, no crepitus and no deformity. Left hip: Normal.      Left knee: She exhibits normal range of motion, no swelling, no effusion, no ecchymosis, no deformity, no laceration, normal alignment, no LCL laxity, normal meniscus and no MCL laxity. No tenderness found. No medial joint line and no lateral joint line tenderness noted. Comments: There is mild diffuse tenderness to palpation throughout the knee maximally along the anterior patellofemoral and medial joint line. There is mild global swelling anteriorly at the knee with no obvious effusion. There is 5 out of 5 strength with knee flexion and extension. Sensation is intact throughout the lower extremity. There is full range of motion at the knee with discomfort at the extremes of motion, especially terminal flexion. No instability with varus or valgus stress testing or anterior/posterior drawer testing. Medial Renato's test produces mild pain but no palpable click. Skin is intact. Normal knee alignment. Pulses intact. Skin:     General: Skin is warm and dry. Neurological:      Mental Status: She is alert and oriented to person, place, and time. Psychiatric:         Mood and Affect: Mood normal.         Behavior: Behavior normal.              Diagnostic testing:  MRI images of the right knee were reviewed by myself discussed with the patient today:  MRI of the right knee completed on 7/18/20      Impression    1. Mild meniscal degeneration.  No evidence of acute meniscal tear. 2. No evidence of acute ligament tear.     3. Moderate patellofemoral compartment osteoarthritis, with focal    full-thickness chondromalacia at the apex of the patella, along with    subchondral cystic change and reactive subchondral marrow edema.  Small    intra-articular loose bodies present posteriorly at the level of the joint    line, measuring approximately 3 mm. 4. Mild medial compartment degenerative changes.  Small joint effusion and    leaking Baker's cyst.    5. Mild quadriceps tendinopathy.               Office Procedures:  No orders of the defined types were placed in this encounter. Assessment and Plan  1. Right knee patellofemoral primary osteoarthritis    2. Right knee intra-articular loose body, 3 mm    I reviewed the MRI results with the patient explained that the most prominent finding is degenerative changes present at the patellofemoral joint with areas of high-grade cartilage loss in cystic changes with bone marrow edema. I have explained to her that this is at least moderate in nature. We discussed the small areas of cartilage degeneration possible small loose body measuring 3 mm. I have explained to her that surgical intervention may be helpful for  Debridement of the patellofemoral joint if she fails conservative measures. She is interested in only considering nonoperative treatments at this time. She has previously underwent a steroid injection and felt that the pain relief did not last very long for her. I have recommended that we proceed with Visco supplementation injection to see if this has more effective pain relief and last longer. We will submit a request for Visco supplementation injections and she will return once they are approved    Continue with low impact exercising, stretching, and strengthening of the knees.     Electronically signed by Jenny Mccarty MD on 8/13/2020 at 9:53 AM

## 2020-08-27 ENCOUNTER — OFFICE VISIT (OUTPATIENT)
Dept: ORTHOPEDIC SURGERY | Age: 60
End: 2020-08-27
Payer: COMMERCIAL

## 2020-08-27 VITALS
HEART RATE: 60 BPM | WEIGHT: 115 LBS | RESPIRATION RATE: 16 BRPM | BODY MASS INDEX: 20.38 KG/M2 | OXYGEN SATURATION: 96 % | HEIGHT: 63 IN

## 2020-08-27 PROCEDURE — 20610 DRAIN/INJ JOINT/BURSA W/O US: CPT | Performed by: ORTHOPAEDIC SURGERY

## 2020-08-27 NOTE — PATIENT INSTRUCTIONS
Tobin Meraz #1 injection   Rest right knee today   Work on ROM and strengthening of knees and legs  Follow up as scheduled for injection #2

## 2020-08-27 NOTE — PROGRESS NOTES
VISCO-SUPPLEMENTATION INJECTION (GELSYN Number 1)    HISTORY OF PRESENT ILLNESS (HPI):   Anton Favre is a 60y.o. year old female who is here for follow up for visco-supplementation injection number 1 for   1. Primary osteoarthritis of right knee    . PAST HISTORY:   Unless otherwise specified in this note, the past history is reviewed today with the patient and is as follows-    Allergies   Allergen Reactions    Gluten Meal     Seasonal        Current Outpatient Medications   Medication Sig Dispense Refill    Magnesium Gluconate 550 MG TABS Take 30 mg by mouth      valACYclovir (VALTREX) 1 g tablet TAKE 2 TABS BY MOUTH EVERY 12 HOURS HOURS X 1 DAY FOR COLD SORES 12 tablet 2    polyethylene glycol (GLYCOLAX) powder Take 17 g by mouth daily      atomoxetine (STRATTERA) 40 MG capsule Take 1 capsule by mouth every morning 30 capsule 5    thyroid (ARMOUR THYROID) 15 MG tablet Take 1 tablet by mouth daily      RESTASIS 0.05 % ophthalmic emulsion Apply 1 drop to eye 2 times daily  1    Cholecalciferol (VITAMIN D3 PO) Take by mouth      magnesium 30 MG tablet Take 30 mg by mouth 2 times daily       No current facility-administered medications for this visit. PHYSICAL EXAM:   Pulse 60   Resp 16   Ht 5' 3\" (1.6 m)   Wt 115 lb (52.2 kg)   LMP 11/09/2011 (Exact Date)   SpO2 96%   BMI 20.37 kg/m²     The right knee is examined:  Inspection:  Skin is intact. No erythema. Palpation:  No swelling or acute tenderness. Neuro/Vascular/Motor:  Sensation to light touch intact. Capillary refill brisk. No focal motor deficits. DIAGNOSIS:     1. Primary osteoarthritis of right knee        PROCEDURE:   Right Knee Injection Procedure:  Multiple treatment options were discussed. Joint injection was recommended. Details of the procedure, potential risks, and potential benefits were discussed. Patient's questions were answered. Patient elected to proceed with procedure.   Medication: Mia Doss  NDC #:[address]-4  Lot #:[id number]  Procedure:  Sterile technique was used as the skin over the injection site was double preped with betadine and alcohol. The knee joint was then injected with the above listed medication. A sterile bandage was placed over the injection site. The patient tolerated the procedure well without complication. The patient is scheduled for the second injection in one week. - hypoxic to 70s with EMS, now on NC 3L and saturating low to mid 90s%  - COVID-19 PCR positive, QTc 439  - continue albuterol HFA PRN  - s/p course of plaquenil and steroids  -currently on course of anakinra Day 3. Will continue with a tapered course.   - cont to monitor respiratory status closely, wean as tolerated

## 2020-09-03 ENCOUNTER — OFFICE VISIT (OUTPATIENT)
Dept: ORTHOPEDIC SURGERY | Age: 60
End: 2020-09-03
Payer: COMMERCIAL

## 2020-09-03 VITALS — WEIGHT: 115 LBS | BODY MASS INDEX: 20.38 KG/M2 | RESPIRATION RATE: 15 BRPM | HEIGHT: 63 IN

## 2020-09-03 PROCEDURE — 20610 DRAIN/INJ JOINT/BURSA W/O US: CPT | Performed by: ORTHOPAEDIC SURGERY

## 2020-09-03 NOTE — PROGRESS NOTES
Patient's questions were answered. Patient elected to proceed with procedure. Medication:GELSYN   NDC #:[address]-5  Lot #::[id number]  Procedure:  Sterile technique was used as the skin over the injection site was double preped with betadine and alcohol. The knee joint was then injected with the above listed medication. A sterile bandage was placed over the injection site. The patient tolerated the procedure well without complication. The patient is scheduled for the third injection in one week.

## 2020-09-10 ENCOUNTER — OFFICE VISIT (OUTPATIENT)
Dept: ORTHOPEDIC SURGERY | Age: 60
End: 2020-09-10
Payer: COMMERCIAL

## 2020-09-10 ENCOUNTER — TELEPHONE (OUTPATIENT)
Dept: ORTHOPEDIC SURGERY | Age: 60
End: 2020-09-10

## 2020-09-10 VITALS — BODY MASS INDEX: 20.38 KG/M2 | RESPIRATION RATE: 16 BRPM | WEIGHT: 115 LBS | HEIGHT: 63 IN

## 2020-09-10 PROCEDURE — 3017F COLORECTAL CA SCREEN DOC REV: CPT | Performed by: ORTHOPAEDIC SURGERY

## 2020-09-10 PROCEDURE — G8427 DOCREV CUR MEDS BY ELIG CLIN: HCPCS | Performed by: ORTHOPAEDIC SURGERY

## 2020-09-10 PROCEDURE — 1036F TOBACCO NON-USER: CPT | Performed by: ORTHOPAEDIC SURGERY

## 2020-09-10 PROCEDURE — G8420 CALC BMI NORM PARAMETERS: HCPCS | Performed by: ORTHOPAEDIC SURGERY

## 2020-09-10 PROCEDURE — 20610 DRAIN/INJ JOINT/BURSA W/O US: CPT | Performed by: ORTHOPAEDIC SURGERY

## 2020-09-10 PROCEDURE — 99213 OFFICE O/P EST LOW 20 MIN: CPT | Performed by: ORTHOPAEDIC SURGERY

## 2020-09-10 ASSESSMENT — ENCOUNTER SYMPTOMS
SHORTNESS OF BREATH: 0
VOMITING: 0
EYE PAIN: 0
EYE REDNESS: 0
WHEEZING: 0
CHEST TIGHTNESS: 0
COLOR CHANGE: 0

## 2020-09-10 NOTE — TELEPHONE ENCOUNTER
Please submit for approval for left knee GELSYN injections. Once approved and in stock call and schedule.      Right knee did so well with the gelsyn so she would like to proceed with left

## 2020-09-10 NOTE — PROGRESS NOTES
9/10/2020   Chief Complaint   Patient presents with    Knee Pain     Right knee GELSYN#3   Worsening left knee pain    History of Present Illness:                             Quita Nichols is a 60 y.o. female who returns today for her third injection in the viscosupplementation of the right knee. She also complains of worsening left knee pain. She has aching soreness of the anterior aspect of her knee worse with deep flexion activities or prolonged standing and walking. Her pain level is a 6 out of 10 on the left knee today. Pain level is a 2 out of 10 on the right knee. She feels that the injections of the right knee have helped her symptoms. As result she is increasing with her pain level in the left knee. She has a history of previous patellar realignment procedure done in [place] many years ago. She healed well from the surgery and has been functioning well respect with respect to her left knee but feels that over the last few weeks her left knee has become more painful. She denies any traumatic injuries or falls. Medical History  Patient's medications, allergies, past medical, surgical, social and family histories were reviewed and updated as appropriate.     Past Medical History:   Diagnosis Date    ADD (attention deficit disorder)     Celiac disease     Nausea      Family History   Problem Relation Age of Onset    Dementia Mother 79        Picks Disease    Celiac Disease Mother     Osteoporosis Mother     Heart Attack Father 68    Cancer Father         Lymphoma    Thyroid Disease Sister         Graves    Osteoporosis Sister     Thyroid Disease Sister     Celiac Disease Sister     Rheum Arthritis Sister      Social History     Socioeconomic History    Marital status:      Spouse name: None    Number of children: None    Years of education: None    Highest education level: None   Occupational History    Occupation: OT     Comment: [employer] Social Needs    Financial resource strain: None    Food insecurity     Worry: None     Inability: None    Transportation needs     Medical: None     Non-medical: None   Tobacco Use    Smoking status: Never Smoker    Smokeless tobacco: Never Used   Substance and Sexual Activity    Alcohol use: Yes     Alcohol/week: 0.0 standard drinks     Comment: occas    Drug use: No    Sexual activity: Not Currently   Lifestyle    Physical activity     Days per week: None     Minutes per session: None    Stress: None   Relationships    Social connections     Talks on phone: None     Gets together: None     Attends Hindu service: None     Active member of club or organization: None     Attends meetings of clubs or organizations: None     Relationship status: None    Intimate partner violence     Fear of current or ex partner: None     Emotionally abused: None     Physically abused: None     Forced sexual activity: None   Other Topics Concern    None   Social History Narrative    None     Current Outpatient Medications   Medication Sig Dispense Refill    Magnesium Gluconate 550 MG TABS Take 30 mg by mouth      valACYclovir (VALTREX) 1 g tablet TAKE 2 TABS BY MOUTH EVERY 12 HOURS HOURS X 1 DAY FOR COLD SORES 12 tablet 2    polyethylene glycol (GLYCOLAX) powder Take 17 g by mouth daily      atomoxetine (STRATTERA) 40 MG capsule Take 1 capsule by mouth every morning 30 capsule 5    thyroid (ARMOUR THYROID) 15 MG tablet Take 1 tablet by mouth daily      RESTASIS 0.05 % ophthalmic emulsion Apply 1 drop to eye 2 times daily  1    Cholecalciferol (VITAMIN D3 PO) Take by mouth      magnesium 30 MG tablet Take 30 mg by mouth 2 times daily       No current facility-administered medications for this visit. Allergies   Allergen Reactions    Gluten Meal     Seasonal        Review of Systems:   Review of Systems   Constitutional: Negative for chills and fever. HENT: Negative for congestion and sneezing. Eyes: Negative for pain and redness. Respiratory: Negative for chest tightness, shortness of breath and wheezing. Cardiovascular: Negative for chest pain and palpitations. Gastrointestinal: Negative for vomiting. Musculoskeletal: Positive for arthralgias. Skin: Negative for color change and rash. Neurological: Negative for weakness and numbness. Psychiatric/Behavioral: Negative for agitation. The patient is not nervous/anxious. Examination:  General Exam:  Vitals: Resp 16   Ht 5' 3\" (1.6 m)   Wt 115 lb (52.2 kg)   LMP 11/09/2011 (Exact Date)   BMI 20.37 kg/m²    Physical Exam  Vitals signs and nursing note reviewed. Constitutional:       Appearance: Normal appearance. HENT:      Head: Normocephalic and atraumatic. Eyes:      Conjunctiva/sclera: Conjunctivae normal.      Pupils: Pupils are equal, round, and reactive to light. Neck:      Musculoskeletal: Normal range of motion. Pulmonary:      Effort: Pulmonary effort is normal.   Musculoskeletal:      Right hip: Normal.      Left hip: She exhibits normal range of motion, normal strength, no tenderness, no bony tenderness, no swelling, no crepitus and no deformity. Right knee: She exhibits normal range of motion, no swelling, no effusion, no ecchymosis, no deformity, no erythema, normal alignment, no LCL laxity, normal patellar mobility, no bony tenderness and no MCL laxity. Tenderness found. Medial joint line tenderness noted. No lateral joint line tenderness noted. Comments: Left Lower Extremity:    There is mild diffuse tenderness to palpation throughout the knee maximally along the patellofemoral joint line. There is a well-healed surgical scar overlying the anterior aspect of the knee from previous patellar realignment at the tibial tubercle. there is mild global swelling anteriorly at the knee with no obvious effusion.   There is 5 out of 5 strength with knee flexion and extension. Sensation is intact throughout the lower extremity. There is full range of motion at the knee with discomfort at the extremes of motion, especially terminal flexion. No instability with varus or valgus stress testing or anterior/posterior drawer testing. Negative medial Renato's test.  Skin is intact. Normal knee alignment. Pulses intact. Right Lower Extremity:    There is mild diffuse tenderness to palpation throughout the knee maximally along the medial joint line. There is mild global swelling anteriorly at the knee with no obvious effusion. There is 5 out of 5 strength with knee flexion and extension. Sensation is intact throughout the lower extremity. There is full range of motion at the knee with discomfort at the extremes of motion, especially terminal flexion. No instability with varus or valgus stress testing or anterior/posterior drawer testing. Medial Renato's test produces mild pain but no palpable click. Skin is intact. Normal knee alignment. Pulses intact. Skin:     General: Skin is warm and dry. Neurological:      Mental Status: She is alert and oriented to person, place, and time. Psychiatric:         Mood and Affect: Mood normal.         Behavior: Behavior normal.            Diagnostic testing:  X-rays reviewed in office, I independently reviewed the films in the office today:   Xr Knee Left (3 Views)    Result Date: 9/10/2020  4 views of the knee show evidence of minimal degenerative changes present in the medial and patellofemoral compartments with mild joint irregularities and spurring with no significant joint space narrowing. There is no evidence of fracture. No acute abnormalities. No effusion. Screws are in place across the tibial tubercle from previous realignment procedure. Office Procedures:  No orders of the defined types were placed in this encounter. Assessment and Plan  1. Left knee mild primary osteoarthritis    2.   Right knee mild primary osteoarthritis      PROCEDURE:   Right Knee Injection Procedure:  Multiple treatment options were discussed. Joint injection was recommended. Details of the procedure, potential risks, and potential benefits were discussed. Patient's questions were answered. Patient elected to proceed with procedure. Medication: [facility] #:[id number]  Lot #:[id number]  Procedure:  Sterile technique was used as the skin over the injection site was double preped with betadine and alcohol. The knee joint was then injected with the above listed medication. A sterile bandage was placed over the injection site. The patient tolerated the procedure well without complication. We also discussed her chronic left knee pain and the progression of her symptoms. I explained to her that this is likely related to the improvement of her pain level in the right knee. She has responded well to the Visco supplementation injections in the right knee and I feel that she is having worsening symptoms now in the left knee with a similar problem. Therefore I recommended he proceed with gel viscosupplementation injections to the left knee. We will seek approval for this and call her once they are approved. She can continue with her exercise program and conservative treatments with activities as tolerated.       Electronically signed by Mykel Wu MD on 9/10/2020 at 4:19 PM

## 2020-09-10 NOTE — PROGRESS NOTES
VISCO-SUPPLEMENTATION INJECTION (GELSYN Number 3)    HISTORY OF PRESENT ILLNESS (HPI):   Yemi Priest is a 60y.o. year old female who is here for follow up for visco-supplementation injection number 3 for   1. Primary osteoarthritis of right knee    2. Chronic pain of right knee    3. Loose body in knee, right    4. Primary osteoarthritis of left knee    . PAST HISTORY:   Unless otherwise specified in this note, the past history is reviewed today with the patient and is as follows-    Allergies   Allergen Reactions    Gluten Meal     Seasonal        Current Outpatient Medications   Medication Sig Dispense Refill    Magnesium Gluconate 550 MG TABS Take 30 mg by mouth      valACYclovir (VALTREX) 1 g tablet TAKE 2 TABS BY MOUTH EVERY 12 HOURS HOURS X 1 DAY FOR COLD SORES 12 tablet 2    polyethylene glycol (GLYCOLAX) powder Take 17 g by mouth daily      atomoxetine (STRATTERA) 40 MG capsule Take 1 capsule by mouth every morning 30 capsule 5    thyroid (ARMOUR THYROID) 15 MG tablet Take 1 tablet by mouth daily      RESTASIS 0.05 % ophthalmic emulsion Apply 1 drop to eye 2 times daily  1    Cholecalciferol (VITAMIN D3 PO) Take by mouth      magnesium 30 MG tablet Take 30 mg by mouth 2 times daily       No current facility-administered medications for this visit. PHYSICAL EXAM:   Resp 16   Ht 5' 3\" (1.6 m)   Wt 115 lb (52.2 kg)   LMP 11/09/2011 (Exact Date)   BMI 20.37 kg/m²     The right knee is examined:  Inspection:  Skin is intact. No erythema. Palpation:  No swelling or acute tenderness. Neuro/Vascular/Motor:  Sensation to light touch intact. Capillary refill brisk. No focal motor deficits. DIAGNOSIS:     1. Primary osteoarthritis of right knee    2. Chronic pain of right knee    3. Loose body in knee, right    4. Primary osteoarthritis of left knee        PROCEDURE:   Right Knee Injection Procedure:  Multiple treatment options were discussed. Joint injection was recommended.   Details

## 2020-09-10 NOTE — PATIENT INSTRUCTIONS
Flor Faye #3 injection   Rest right knee today   Work on ROM and strengthening of knees and legs  Follow up as needed for right knee, if desired to repeat GEL series will need to follow up in 6 months for reevaluation to restart process       Office will submit for approval for left knee GELSYN once approved and in stock we will call and schedule appointments

## 2020-09-18 NOTE — TELEPHONE ENCOUNTER
Tia from [facility]   PA not required for  Gelsyn 3 injection.   Dx M17.12 Left Knee  CPT  GELSYN 3  Ref # [id number]  Please Order Medication

## 2020-09-24 NOTE — TELEPHONE ENCOUNTER
KURTIS to return call back to the office to make 3 appointments for the left knee for her Glesyn injections

## 2020-10-08 ENCOUNTER — PROCEDURE VISIT (OUTPATIENT)
Dept: ORTHOPEDIC SURGERY | Age: 60
End: 2020-10-08
Payer: COMMERCIAL

## 2020-10-08 VITALS — BODY MASS INDEX: 20.46 KG/M2 | WEIGHT: 115.5 LBS | RESPIRATION RATE: 16 BRPM | HEIGHT: 63 IN

## 2020-10-08 PROCEDURE — 20610 DRAIN/INJ JOINT/BURSA W/O US: CPT | Performed by: ORTHOPAEDIC SURGERY

## 2020-10-08 NOTE — PATIENT INSTRUCTIONS
GELSYN3  #1injection   Rest Left knee today   Work on ROM and strengthening of knees/legs   follow up as scheduled for injection #2

## 2020-10-15 ENCOUNTER — PROCEDURE VISIT (OUTPATIENT)
Dept: ORTHOPEDIC SURGERY | Age: 60
End: 2020-10-15
Payer: COMMERCIAL

## 2020-10-15 VITALS
BODY MASS INDEX: 20.55 KG/M2 | HEIGHT: 63 IN | RESPIRATION RATE: 16 BRPM | HEART RATE: 75 BPM | WEIGHT: 116 LBS | OXYGEN SATURATION: 98 %

## 2020-10-15 PROCEDURE — 20610 DRAIN/INJ JOINT/BURSA W/O US: CPT | Performed by: PHYSICIAN ASSISTANT

## 2020-10-15 NOTE — PATIENT INSTRUCTIONS
Gelysyn 3  Rest the left knee for 24-48 hours  Work on ROM and strengthening of the left knee and leg  Rest, ice, and elevate as needed  May take Ibuprofen or Aleve as needed  Follow up next week for Gelsyn 3 # 3

## 2020-10-15 NOTE — PROGRESS NOTES
VISCO-SUPPLEMENTATION INJECTION (Number 2)  I reviewed and agree with the portions of the HPI, review of systems, vital documentation and plan performed by my staff and have added/addended where appropriate. HISTORY OF PRESENT ILLNESS (HPI):   Loren Pollack is a 60y.o. year old female who is here for follow up for visco-supplementation injection number 2 for osteoarthritis of the left knee. PAST HISTORY:   Unless otherwise specified in this note, the past history is reviewed today with the patient and is as follows-Previous shot is working great with no pain reported today. Allergies   Allergen Reactions    Gluten Meal     Seasonal        Current Outpatient Medications   Medication Sig Dispense Refill    Magnesium Gluconate 550 MG TABS Take 30 mg by mouth      valACYclovir (VALTREX) 1 g tablet TAKE 2 TABS BY MOUTH EVERY 12 HOURS HOURS X 1 DAY FOR COLD SORES 12 tablet 2    polyethylene glycol (GLYCOLAX) powder Take 17 g by mouth daily      atomoxetine (STRATTERA) 40 MG capsule Take 1 capsule by mouth every morning 30 capsule 5    thyroid (ARMOUR THYROID) 15 MG tablet Take 1 tablet by mouth daily      RESTASIS 0.05 % ophthalmic emulsion Apply 1 drop to eye 2 times daily  1    Cholecalciferol (VITAMIN D3 PO) Take by mouth      magnesium 30 MG tablet Take 30 mg by mouth 2 times daily       No current facility-administered medications for this visit. PHYSICAL EXAM:   Pulse 75   Resp 16   Ht 5' 3\" (1.6 m)   Wt 116 lb (52.6 kg)   LMP 11/09/2011 (Exact Date)   SpO2 98%   BMI 20.55 kg/m²     The left knee is examined:  Inspection:  Skin is intact. No erythema. Palpation:  No swelling or acute tenderness. Neuro/Vascular/Motor:  Sensation to light touch intact. Capillary refill brisk. No focal motor deficits. DIAGNOSIS:   Osteoarthritis of the left knee    PROCEDURE:   Left Knee Aspiration / Injection Procedure:  Multiple treatment options were discussed.   Joint injection was recommended. Details of the procedure, potential risks, and potential benefits were discussed. Patient's questions were answered. Patient elected to proceed with procedure. Medication: Gelsyn 3  [address] #:[id number]  Lot #:[id number]  Procedure:  Sterile technique was used as the skin over the injection site was prepped withalcohol. .  The left knee joint was then injected with the above listed medication. A sterile bandage was placed over the injection site. The patient tolerated the procedure well without complication. The patient is scheduled for the third injection in one week.

## 2020-10-27 ENCOUNTER — OFFICE VISIT (OUTPATIENT)
Dept: PRIMARY CARE CLINIC | Age: 60
End: 2020-10-27
Payer: COMMERCIAL

## 2020-10-27 ENCOUNTER — HOSPITAL ENCOUNTER (OUTPATIENT)
Age: 60
Setting detail: SPECIMEN
Discharge: HOME | End: 2020-10-27
Payer: COMMERCIAL

## 2020-10-27 VITALS — OXYGEN SATURATION: 97 % | HEART RATE: 86 BPM | TEMPERATURE: 96.9 F

## 2020-10-27 PROCEDURE — G8428 CUR MEDS NOT DOCUMENT: HCPCS | Performed by: NURSE PRACTITIONER

## 2020-10-27 PROCEDURE — U0002 COVID-19 LAB TEST NON-CDC: HCPCS

## 2020-10-27 PROCEDURE — G8484 FLU IMMUNIZE NO ADMIN: HCPCS | Performed by: NURSE PRACTITIONER

## 2020-10-27 PROCEDURE — 3017F COLORECTAL CA SCREEN DOC REV: CPT | Performed by: NURSE PRACTITIONER

## 2020-10-27 PROCEDURE — 1036F TOBACCO NON-USER: CPT | Performed by: NURSE PRACTITIONER

## 2020-10-27 PROCEDURE — 99213 OFFICE O/P EST LOW 20 MIN: CPT | Performed by: NURSE PRACTITIONER

## 2020-10-27 PROCEDURE — G8420 CALC BMI NORM PARAMETERS: HCPCS | Performed by: NURSE PRACTITIONER

## 2020-10-27 NOTE — PROGRESS NOTES
10/27/20  Hilaria Gupta  1960    FLU/COVID-19 CLINIC EVALUATION    HPI SYMPTOMS:    Employer: [employer]    [] Fevers  [] Chills  [x] Cough  [] Coughing up blood  [] Chest Congestion  [] Nasal Congestion  [] Feeling short of breath  [] Sometimes  [] Frequently  [] All the time  [] Chest pain  [] Headaches  []Tolerable  [] Severe  [x] Sore throat  [] Muscle aches  [] Nausea  [] Vomiting  []Unable to keep fluids down  [] Diarrhea  []Severe    [x] OTHER SYMPTOMS: FATIGUE      Symptom Duration:   [x] 1  [] 2   [] 3   [] 4    [] 5   [] 6   [] 7   [] 8   [] 9   [] 10   [] 11   [] 12   [] 13   [] 14   [] Longer than 14 days    Symptom course:   [] Worsening     [x] Stable     [] Improving    RISK FACTORS:    [] Pregnant or possibly pregnant  [x] Age over 61  [] Diabetes  [] Heart disease  [] Asthma  [] COPD/Other chronic lung diseases  [] Active Cancer  [] On Chemotherapy  [] Taking oral steroids  [] History Lymphoma/Leukemia  [] Close contact with a lab confirmed COVID-19 patient within 14 days of symptom onset  [] History of travel from affected geographical areas within 14 days of symptom onset       VITALS:  There were no vitals filed for this visit. TESTS:    POCT FLU:  [] Positive     []Negative    ASSESSMENT:    [] Flu  [] Possible COVID-19  [] Strep    PLAN:    [] Discharge home with written instructions for:  [] Flu management  [] Possible COVID-19 infection with self-quarantine and management of symptoms  [] Follow-up with primary care physician or emergency department if worsens  [] Evaluation per physician/nurse practitioner in clinic  [] Sent to ER       An  electronic signature was used to authenticate this note.      --Araceli Rutherford MA on 10/27/2020 at 12:05 PM

## 2020-10-27 NOTE — PROGRESS NOTES
10/27/2020    HPI:  Chief complaint and history of present illness as per medical assistant/nurse documented today in the Flu/COVID-19 clinic. MEDICATIONS:  Prior to Visit Medications    Medication Sig Taking? Authorizing Provider   Magnesium Gluconate 550 MG TABS Take 30 mg by mouth  Historical Provider, MD   valACYclovir (VALTREX) 1 g tablet TAKE 2 TABS BY MOUTH EVERY 12 HOURS HOURS X 1 DAY FOR COLD SORES  Jimmy Whitlock APRN - CNP   polyethylene glycol (GLYCOLAX) powder Take 17 g by mouth daily  Historical Provider, MD   atomoxetine (STRATTERA) 40 MG capsule Take 1 capsule by mouth every morning  Rayshawn Manriquez MD   thyroid (ARMOUR THYROID) 15 MG tablet Take 1 tablet by mouth daily  Historical Provider, MD   RESTASIS 0.05 % ophthalmic emulsion Apply 1 drop to eye 2 times daily  Historical Provider, MD   Cholecalciferol (VITAMIN D3 PO) Take by mouth  Historical Provider, MD   magnesium 30 MG tablet Take 30 mg by mouth 2 times daily  Historical Provider, MD       Allergies   Allergen Reactions    Gluten Meal     Seasonal    ,   Past Medical History:   Diagnosis Date    ADD (attention deficit disorder)     Celiac disease     Nausea    ,   Past Surgical History:   Procedure Laterality Date    COLONOSCOPY  2008    COLONOSCOPY  07/27/2018    repeat in 10 years    ENDOSCOPY, COLON, DIAGNOSTIC      PATELLA SURGERY Left 2004    SHOULDER SURGERY Right 2015    Fester    TONSILLECTOMY      WISDOM TOOTH EXTRACTION     ,   Social History     Tobacco Use    Smoking status: Never Smoker    Smokeless tobacco: Never Used   Substance Use Topics    Alcohol use:  Yes     Alcohol/week: 0.0 standard drinks     Comment: occas    Drug use: No   ,   Family History   Problem Relation Age of Onset    Dementia Mother 79        Picks Disease    Celiac Disease Mother     Osteoporosis Mother     Heart Attack Father 68    Cancer Father         Lymphoma    Thyroid Disease Sister         Graves    Osteoporosis Sister    [place] Thyroid Disease Sister     Celiac Disease Sister     Rheum Arthritis Sister    ,   Immunization History   Administered Date(s) Administered    Influenza Vaccine, unspecified formulation 10/19/2018    Influenza, MDCK Quadv, with preserv IM (Flucelvax 4 yrs and older) 10/19/2018    Influenza, Quadv, IM, PF (6 mo and older Fluzone, Flulaval, Fluarix, and 3 yrs and older Afluria) 11/18/2019    Tdap (Boostrix, Adacel) 12/10/2013    Zoster Recombinant (Shingrix) 08/06/2018, 01/23/2019   ,   Health Maintenance   Topic Date Due    A1C test (Diabetic or Prediabetic)  06/13/2020    Lipid screen  07/06/2020    Flu vaccine (1) 09/01/2020    Cervical cancer screen  06/09/2021    Breast cancer screen  06/29/2022    DTaP/Tdap/Td vaccine (2 - Td) 12/10/2023    Colon cancer screen colonoscopy  07/30/2028    Shingles Vaccine  Completed    Hepatitis C screen  Completed    HIV screen  Completed    Hepatitis A vaccine  Aged Out    Hepatitis B vaccine  Aged Out    Hib vaccine  Aged Out    Meningococcal (ACWY) vaccine  Aged Out    Pneumococcal 0-64 years Vaccine  Aged Out       PHYSICAL EXAM:  Physical Exam  Constitutional:       Appearance: Normal appearance. HENT:      Head: Normocephalic. Right Ear: Tympanic membrane, ear canal and external ear normal.      Left Ear: Tympanic membrane, ear canal and external ear normal.      Nose: Nose normal.      Mouth/Throat:      Lips: Pink. Mouth: Mucous membranes are moist.      Pharynx: Oropharynx is clear. Neck:      Musculoskeletal: Neck supple. Cardiovascular:      Rate and Rhythm: Normal rate and regular rhythm. Heart sounds: Normal heart sounds. Pulmonary:      Effort: Pulmonary effort is normal.      Breath sounds: Normal breath sounds. Skin:     General: Skin is warm and dry. Neurological:      Mental Status: She is alert and oriented to person, place, and time.    Psychiatric:         Mood and Affect: Mood normal.         Behavior: Behavior normal.         ASSESSMENT/PLAN:  1. Cough  Your COVID 19 test can take 3-5 days for the results come back. We ask that you make a Mychart page and view your test results this way. You will need to Self quarantine until you know your results. Increase fluids rest  Saline nasal spray as directed  Warm salt gargles for throat discomfort  Monitor temperature twice a day  Tylenol for fevers and/or discomfort. If symptoms are worse -Go to the ER. Follow up with your primary doctor    To Whom it May Concern:    Sylvie Simon has been tested for COVID on 10/27/20. They may NOT return to work until their lab test results back and they been fever free for 3 days. If test is positive they must stay home for 2 weeks or until they test negative or as directed by the [facility] Department. - COVID-19 Ambulatory    2. Sore throat    - COVID-19 Ambulatory      FOLLOW-UP:  Return if symptoms worsen or fail to improve.     In addition to other information, the printed after visit summary provided to the patient includes:  [x] COVID-19 Self care instructions  [x] COVID-19 General patient information

## 2020-10-29 LAB
SARS-COV-2: NOT DETECTED
SOURCE: NORMAL

## 2020-11-06 ENCOUNTER — OFFICE VISIT (OUTPATIENT)
Dept: ORTHOPEDIC SURGERY | Age: 60
End: 2020-11-06
Payer: COMMERCIAL

## 2020-11-06 VITALS — WEIGHT: 116 LBS | BODY MASS INDEX: 20.55 KG/M2 | HEIGHT: 63 IN

## 2020-11-06 PROCEDURE — 20610 DRAIN/INJ JOINT/BURSA W/O US: CPT | Performed by: PHYSICIAN ASSISTANT

## 2020-11-06 NOTE — PROGRESS NOTES
VISCO-SUPPLEMENTATION INJECTION (Number 3)  I reviewed and agree with the portions of the HPI, review of systems, vital documentation and plan performed by my staff and have added/addended where appropriate. HISTORY OF PRESENT ILLNESS (HPI):   Mitchel Huerta is a 63y.o. year old female who is here for follow up for visco-supplementation injection number 3 for osteoarthritis of the left knee. PAST HISTORY:   Unless otherwise specified in this note, the past history is reviewed today with the patient and is as follows-Previous shot is working great with no pain reported today. Allergies   Allergen Reactions    Gluten Meal     Seasonal        Current Outpatient Medications   Medication Sig Dispense Refill    Magnesium Gluconate 550 MG TABS Take 30 mg by mouth      valACYclovir (VALTREX) 1 g tablet TAKE 2 TABS BY MOUTH EVERY 12 HOURS HOURS X 1 DAY FOR COLD SORES 12 tablet 2    polyethylene glycol (GLYCOLAX) powder Take 17 g by mouth daily      atomoxetine (STRATTERA) 40 MG capsule Take 1 capsule by mouth every morning 30 capsule 5    thyroid (ARMOUR THYROID) 15 MG tablet Take 1 tablet by mouth daily      RESTASIS 0.05 % ophthalmic emulsion Apply 1 drop to eye 2 times daily  1    Cholecalciferol (VITAMIN D3 PO) Take by mouth      magnesium 30 MG tablet Take 30 mg by mouth 2 times daily       No current facility-administered medications for this visit. PHYSICAL EXAM:   Ht 5' 3\" (1.6 m)   Wt 116 lb (52.6 kg)   LMP 11/09/2011 (Exact Date)   BMI 20.55 kg/m²     The left knee is examined:  Inspection:  Skin is intact. No erythema. Palpation:  No swelling or acute tenderness. Neuro/Vascular/Motor:  Sensation to light touch intact. Capillary refill brisk. No focal motor deficits. DIAGNOSIS:   Osteoarthritis of the left knee    PROCEDURE:   Left Knee Aspiration / Injection Procedure:  Multiple treatment options were discussed. Joint injection was recommended.   Details of the procedure, potential risks, and potential benefits were discussed. Patient's questions were answered. Patient elected to proceed with procedure. Medication: Gelsyn 3  [address] #:[address]-7  Lot #:[id number]  Procedure:  Sterile technique was used as the skin over the injection site was prepped withalcohol. .  The left knee joint was then injected with the above listed medication. A sterile bandage was placed over the injection site. The patient tolerated the procedure well without complication. The patient is scheduled for the third injection in one week.

## 2020-12-17 ENCOUNTER — OFFICE VISIT (OUTPATIENT)
Dept: FAMILY MEDICINE CLINIC | Age: 60
End: 2020-12-17
Payer: COMMERCIAL

## 2020-12-17 VITALS
SYSTOLIC BLOOD PRESSURE: 92 MMHG | OXYGEN SATURATION: 98 % | DIASTOLIC BLOOD PRESSURE: 60 MMHG | BODY MASS INDEX: 20.55 KG/M2 | TEMPERATURE: 97.3 F | HEIGHT: 63 IN | WEIGHT: 116 LBS | HEART RATE: 100 BPM

## 2020-12-17 LAB
CHOLESTEROL, TOTAL: 191 MG/DL (ref 0–199)
HDLC SERPL-MCNC: 78 MG/DL (ref 40–60)
LDL CHOLESTEROL CALCULATED: 96 MG/DL
TRIGL SERPL-MCNC: 83 MG/DL (ref 0–150)
VLDLC SERPL CALC-MCNC: 17 MG/DL

## 2020-12-17 PROCEDURE — 36415 COLL VENOUS BLD VENIPUNCTURE: CPT | Performed by: FAMILY MEDICINE

## 2020-12-17 PROCEDURE — 99396 PREV VISIT EST AGE 40-64: CPT | Performed by: FAMILY MEDICINE

## 2020-12-17 RX ORDER — ATOMOXETINE 40 MG/1
40 CAPSULE ORAL EVERY MORNING
Qty: 30 CAPSULE | Refills: 5 | Status: SHIPPED | OUTPATIENT
Start: 2020-12-17 | End: 2021-05-03

## 2020-12-17 NOTE — PROGRESS NOTES
History and Physical      David Gupta  YOB: 1960    Date of Service:  12/17/2020    Chief Complaint:   Berta Clayton is a 63 y.o. female who presents for complete physical examination. HPI: Here for annual exam.  Knees are doing much better with the injections. Feels like there is something in her left ear. The Adderall works well for her and would like to continue taking it. A nurse practitioner in [place] checked her A1c and it was five-point something. She cannot recall the name of the nurse practitioner. ADD  Hx abnormal PAP: no  Sexual activity: none   Alcohol occasional  Exercise: frequent  Seatbelt use:Yes  Sees dentist every 6 months: yes  Brush teeth 3 x per day: no  Intimate partner violence?  No  Substance abuse? no    Wt Readings from Last 3 Encounters:   12/17/20 116 lb (52.6 kg)   11/06/20 116 lb (52.6 kg)   10/15/20 116 lb (52.6 kg)     BP Readings from Last 3 Encounters:   12/17/20 92/60   07/21/20 123/74   01/13/20 122/72       Patient Active Problem List   Diagnosis    Gluten-sensitive enteropathy    Allergic rhinitis    HSV-1 (herpes simplex virus 1) infection    Gastroesophageal reflux disease without esophagitis    Slow transit constipation    ADD (attention deficit disorder)    Family history of osteoporosis    Kyphosis    Impingement syndrome of right shoulder    Right rotator cuff tendinitis    Impaired glucose tolerance    Osteoarthritis of lumbar spine       Preventive Care:  Health Maintenance   Topic Date Due    A1C test (Diabetic or Prediabetic)  06/13/2020    Lipid screen  07/06/2020    Flu vaccine (1) 12/17/2021 (Originally 9/1/2020)    Cervical cancer screen  06/09/2021    Breast cancer screen  06/29/2022    DTaP/Tdap/Td vaccine (2 - Td) 12/10/2023    Colon cancer screen colonoscopy  07/30/2028    Shingles Vaccine  Completed    Hepatitis C screen  Completed    HIV screen  Completed    Hepatitis A vaccine  Aged Out    Hepatitis B vaccine  Aged Out    Hib vaccine  Aged Out    Meningococcal (ACWY) vaccine  Aged Out    Pneumococcal 0-64 years Vaccine  Aged Out      Lipid panel:   Lab Results   Component Value Date    CHOL 196 07/06/2015    TRIG 54 07/06/2015    HDL 88 07/06/2015    LDLCALC 77 11/21/2012    LDLDIRECT 100 (H) 07/06/2015          Immunization History   Administered Date(s) Administered    Influenza Vaccine, unspecified formulation 10/19/2018    Influenza, MDCK Quadv, with preserv IM (Flucelvax 4 yrs and older) 10/19/2018    Influenza, Quadv, IM, PF (6 mo and older Fluzone, Flulaval, Fluarix, and 3 yrs and older Afluria) 11/18/2019    Tdap (Boostrix, Adacel) 12/10/2013    Zoster Recombinant (Shingrix) 08/06/2018, 01/23/2019       Allergies   Allergen Reactions    Gluten Meal     Seasonal      Outpatient Medications Marked as Taking for the 12/17/20 encounter (Office Visit) with Arleth White MD   Medication Sig Dispense Refill    Menaquinone-7 (VITAMIN K2 PO) Take 1 tablet by mouth daily      Fluticasone Propionate (FLONASE NA) 1 spray by Nasal route daily as needed      atomoxetine (STRATTERA) 40 MG capsule Take 1 capsule by mouth every morning 30 capsule 5    valACYclovir (VALTREX) 1 g tablet TAKE 2 TABS BY MOUTH EVERY 12 HOURS HOURS X 1 DAY FOR COLD SORES 12 tablet 2    polyethylene glycol (GLYCOLAX) powder Take 17 g by mouth daily      thyroid (ARMOUR THYROID) 15 MG tablet Take 1 tablet by mouth daily      RESTASIS 0.05 % ophthalmic emulsion Apply 1 drop to eye 2 times daily  1    Cholecalciferol (VITAMIN D3 PO) Take by mouth      magnesium 30 MG tablet Take 30 mg by mouth 2 times daily         Past Medical History:   Diagnosis Date    ADD (attention deficit disorder)     Celiac disease     Nausea      Past Surgical History:   Procedure Laterality Date    COLONOSCOPY  2008    COLONOSCOPY  07/27/2018    repeat in 10 years    ENDOSCOPY, COLON, DIAGNOSTIC      PATELLA SURGERY Left 2004    SHOULDER SURGERY Right 2015    Fester    TONSILLECTOMY      WISDOM TOOTH EXTRACTION       Family History   Problem Relation Age of Onset    Dementia Mother 79        Picks Disease    Celiac Disease Mother     Osteoporosis Mother     Heart Attack Father 68    Cancer Father         Lymphoma    Thyroid Disease Sister         Graves    Osteoporosis Sister     Thyroid Disease Sister     Celiac Disease Sister     Rheum Arthritis Sister          Review of Systems:  A comprehensive review of systems was negative except for what was noted in the HPI. Physical Exam:   Vitals:    12/17/20 1055   BP: 92/60   Pulse: 100   Temp: 97.3 °F (36.3 °C)   TempSrc: Infrared   SpO2: 98%   Weight: 116 lb (52.6 kg)   Height: 5' 3\" (1.6 m)     Body mass index is 20.55 kg/m². Constitutional: She is oriented to person, place, and time. She appears well-developed and well-nourished. No distress. HEENT:   Head: Normocephalic and atraumatic. Right Ear: Tympanic membrane, external ear and ear canal normal.   Left Ear: Tympanic membrane, external ear and ear canal normal.   Nose: Nose normal.   Mouth/Throat: Oropharynx is clear and moist, and mucous membranes are normal.  There is no cervical adenopathy. Eyes: Conjunctivae and extraocular motions are normal. Pupils are equal, round, and reactive to light. Neck: Neck supple. No JVD present. Carotid bruit is not present. No mass and no thyromegaly present. Cardiovascular: Normal rate, regular rhythm, normal heart sounds and intact distal pulses. Exam reveals no gallop and no friction rub. No murmur heard. Pulmonary/Chest: Effort normal and breath sounds normal. No respiratory distress. She has no wheezes, rhonchi or rales. Abdominal: Soft, non-tender. Bowel sounds and aorta are normal. She exhibits no organomegaly, mass or bruit. Genitourinary: examination not indicated.   Breast exam:  breasts appear normal, no suspicious masses, no skin or nipple changes or axillary nodes.  Musculoskeletal: Normal range of motion, no synovitis. She exhibits no edema. Neurological: She is alert and oriented to person, place, and time. She has normal reflexes. No cranial nerve deficit. Coordination normal.   Skin: Skin is warm and dry. There is no rash or erythema. No suspicious lesions noted. Psychiatric: She has a normal mood and affect. Her speech is normal and behavior is normal. Judgment, cognition and memory are normal.       Wt Readings from Last 3 Encounters:   12/17/20 116 lb (52.6 kg)   11/06/20 116 lb (52.6 kg)   10/15/20 116 lb (52.6 kg)     BP Readings from Last 3 Encounters:   12/17/20 92/60   07/21/20 123/74   01/13/20 122/72          The ASCVD Risk score (Nik Jordan et al., 2013) failed to calculate for the following reasons:    Cannot find a previous HDL lab    Cannot find a previous total cholesterol lab        Lab Review:   Hospital Outpatient Visit on 10/27/2020   Component Date Value    Source 10/27/2020 VIRAL SWAB     SARS-CoV-2 10/27/2020 NOT DETECTED         Assessment/Plan:    Kirill Franco was seen today for annual exam.    Diagnoses and all orders for this visit:    Annual physical exam  -     Hemoglobin A1C  -     Lipid Panel    Attention deficit disorder (ADD) without hyperactivity  -     atomoxetine (STRATTERA) 40 MG capsule; Take 1 capsule by mouth every morning    Impaired glucose tolerance  -     Hemoglobin A1C        Goal of 5 vegetables and fruits per day or half the plate be vegetable and fruits      30 minutes of walking per day or 2 1/2 hours per week of walking or 8,000 steps     Patient does not have > 7.5% cardiac risk . Baby ASA needed?   No    Per USPTF guidelines, patient is not needing mammogram at this time

## 2020-12-17 NOTE — PATIENT INSTRUCTIONS
PLEASE BRING YOUR MEDICATIONS TO ALL APPOINTMENTS    The diagnoses and medications listed in this after visit summary may not be accurate at the time of check out. Please check MY CHART in 28-48 hours for possible corrections. Late cancellation policy: So that we can better accommodate people who are sick, please give our office 24 hour notice for an appointment cancellation. Thank you. Missed appointments: Your care is very important to us. It is important that you keep your scheduled appointments. Multiple missed appointments will lead to a dismissal from the office. Later arrival policy: If you are more than 10 minutes late for your appointment, you will be asked to reschedule. Please allow 5-7 business days for paperwork to be processed. It is important that you check your MY Chart messages, as they include appointment reminders, test results, and other important information. If you have forgotten your password, please call [phone].          1. Take your blood pressure medications at night. This reduces your chance of cardiovascular event by half  2. Fever in kids: It's best to give both Tylenol and Ibuprofen at the same time rather than staggering them which is confusing  3. Pediatric obesity is decreased by less exposure to antibiotics, consuming whole milk instead of skim milk, watching public TV instead of regular TV, and experiencing fewer adverse childhood events  4. 1 egg per day is good for your heart  5. Alternate day fasting does promote weight loss. 6. Skipping breakfast increases your risk of obesity  7. Artificially sweetened drinks increase all cause mortality (strokes, BMI, cardiovascular)  8. Kale consumption can reduce onset of dementia  9. Walking at least 8000 steps per day and resistance exercise 2-3 x per week are good for your heart  10.  Covid 19:  Wearing gloves is not that helpful  Having low vitamin D levels increases risk of infection (take 2-4000 units of D3 per day until our covid crisis is resolved)  11. Brushing teeth 3 times per day can decrease chance of getting diabetes    Learning About Eating More Fruits and Vegetables  What are some quick tips for eating more fruits and vegetables? We're all encouraged to eat more fruits and vegetables. Yet it can seem like one more chore on the daily to-do list. But you can add color and crunch to your meals--and lots of nutrition--with these quick tips. · Brighten up your breakfast.  ? Add sliced fruit or frozen berries to your yogurt, pancakes, or cereal.  ? Blend fresh or frozen fruit, veggies, and yogurt with a little fruit juice, and you've got a tasty smoothie. ? Make your scrambled eggs a gourmet treat by adding onions, celery, and bell peppers. ? Bake up some bran muffins with grated carrots added into the mix. · Make a livelier lunch. ? Jazz up tuna or chicken salad with apple chunks, celery, or grapes--or all of them! ? Add cucumbers, avocado slices, tomatoes, and lettuce to your sandwiches. ? Kick up the flavor of grilled cheese sandwiches with spinach and tomatoes. ? Puree some potatoes or squash to add to tomato soup. · Add delicious veggies to dinner. ? Give more color and taste to salads. Stir in red cabbage, carrots, and bell peppers. Top salads with dried cranberries or raisins. \"Frost\" your salad with orange sections or strawberries. ? Keep a bag or two of frozen vegetables ready to pull out of the freezer for a side dish. ? Spice up spaghetti and meatballs with mushrooms and bell peppers. ? Roast vegetables like cauliflower or squash in the oven with olive oil to bring out their flavor. ? Season your veggie dish with herbs like basil and meri and a splash of lemon juice and olive oil. ? If you've got a main dish in the oven, stick in a potato to round out your meal.  · Grab some healthy snacks on the go. ? Scoop up an apple, banana, or plum for a quick snack.   ? Cut up raw fruits and veggies to keep in your fridge. Grapes, oranges, carrots, and celery are great choices. They'll be ready for a quick snack or an after-school treat. ? Dip raw vegetables in hummus or peanut butter. ? Keep dried fruit on hand for an easy \"take with you\" snack. · Make something sweet--and healthy. ? Try baked apples or pears topped with cinnamon and honey for a delicious dessert. ? Make chocolate chip cookies even better with grated carrots added to the mix. Where can you learn more? Go to https://BreathometerpeMarine Life [employer]earcheb.Spinnakr. org and sign in to your [employer] account. Enter F050 in the HIGH MOBILITY box to learn more about \"Learning About Eating More Fruits and Vegetables. \"     If you do not have an account, please click on the \"Sign Up Now\" link. Current as of: November 7, 2018  Content Version: 12.0  © 9279-8251 Healthwise, Incorporated. Care instructions adapted under license by [facility] ([facility]). If you have questions about a medical condition or this instruction, always ask your healthcare professional. [place] [address] any warranty or liability for your use of this information.

## 2020-12-18 LAB
ESTIMATED AVERAGE GLUCOSE: 114 MG/DL
HBA1C MFR BLD: 5.6 %

## 2021-01-13 ENCOUNTER — TELEPHONE (OUTPATIENT)
Dept: FAMILY MEDICINE CLINIC | Age: 61
End: 2021-01-13

## 2021-01-13 DIAGNOSIS — B00.9 HSV-1 (HERPES SIMPLEX VIRUS 1) INFECTION: ICD-10-CM

## 2021-01-13 RX ORDER — VALACYCLOVIR HYDROCHLORIDE 1 G/1
TABLET, FILM COATED ORAL
Qty: 12 TABLET | Refills: 1 | Status: SHIPPED | OUTPATIENT
Start: 2021-01-13 | End: 2021-08-09 | Stop reason: SDUPTHER

## 2021-01-13 NOTE — TELEPHONE ENCOUNTER
Patient requesting refill of Valtrex  [facility] [address]

## 2021-05-03 ENCOUNTER — VIRTUAL VISIT (OUTPATIENT)
Dept: FAMILY MEDICINE CLINIC | Age: 61
End: 2021-05-03
Payer: COMMERCIAL

## 2021-05-03 DIAGNOSIS — F98.8 ATTENTION DEFICIT DISORDER (ADD) WITHOUT HYPERACTIVITY: Primary | ICD-10-CM

## 2021-05-03 PROCEDURE — 99213 OFFICE O/P EST LOW 20 MIN: CPT | Performed by: FAMILY MEDICINE

## 2021-05-03 PROCEDURE — G8427 DOCREV CUR MEDS BY ELIG CLIN: HCPCS | Performed by: FAMILY MEDICINE

## 2021-05-03 PROCEDURE — 3017F COLORECTAL CA SCREEN DOC REV: CPT | Performed by: FAMILY MEDICINE

## 2021-05-03 RX ORDER — ATOMOXETINE 60 MG/1
60 CAPSULE ORAL DAILY
Qty: 90 CAPSULE | Refills: 0 | Status: SHIPPED | OUTPATIENT
Start: 2021-05-03 | End: 2021-08-09 | Stop reason: SDUPTHER

## 2021-05-03 ASSESSMENT — PATIENT HEALTH QUESTIONNAIRE - PHQ9
SUM OF ALL RESPONSES TO PHQ QUESTIONS 1-9: 0

## 2021-05-03 NOTE — PROGRESS NOTES
Reactions    Gluten Meal     Seasonal    ,   Past Medical History:   Diagnosis Date    ADD (attention deficit disorder)     Celiac disease     Nausea        PHYSICAL EXAMINATION:  [ INSTRUCTIONS:  \"[x]\" Indicates a positive item  \"[]\" Indicates a negative item  -- DELETE ALL ITEMS NOT EXAMINED]  Vital Signs: (As obtained by patient/caregiver or practitioner observation)    Blood pressure-  Heart rate-    Respiratory rate-    Temperature-  Pulse oximetry-     Constitutional: [x] Appears well-developed and well-nourished [] No apparent distress      [] Abnormal-   Mental status  [x] Alert and awake  [x] Oriented to person/place/time [x]Able to follow commands      Eyes:  EOM    []  Normal  [] Abnormal-  Sclera  []  Normal  [] Abnormal -         Discharge []  None visible  [] Abnormal -    HENT:   [x] Normocephalic, atraumatic. [x] Abnormal   [] Mouth/Throat: Mucous membranes are moist.     External Ears [x] Normal  [] Abnormal-     Neck: [x] No visualized mass     Pulmonary/Chest: [] Respiratory effort normal.  [] No visualized signs of difficulty breathing or respiratory distress        [] Abnormal-      Musculoskeletal:   [x] Normal gait with no signs of ataxia         [] Normal range of motion of neck        [] Abnormal-       Neurological:        [x] No Facial Asymmetry (Cranial nerve 7 motor function) (limited exam to video visit)          [x] No gaze palsy        [] Abnormal-         Skin:        [x] No significant exanthematous lesions or discoloration noted on facial skin         [] Abnormal-            Psychiatric:       [x] Normal Affect [x] No Hallucinations        [] Abnormal-     Other pertinent observable physical exam findings-      Diagnosis Orders   1. Attention deficit disorder (ADD) without hyperactivity  atomoxetine (STRATTERA) 60 MG capsule     We will increase the dose of the Strattera from 40 mg to 60 mg and see how she does. Re-check in 3 months.     We will also see her in the summer for a Pap smear. This is likely her last Pap smear. Return in about 3 months (around 8/4/2021). Bonnie Cheadle, was evaluated through a synchronous (real-time) audio-video encounter. The patient (or guardian if applicable) is aware that this is a billable service. Verbal consent to proceed has been obtained within the past 12 months. The visit was conducted pursuant to the emergency declaration under the [address] [place] [address], [address] [place] [address] and the "[employer]" and [employer] General Act. Patient identification was verified, and a caregiver was present when appropriate. The patient was located in a state where the provider was credentialed to provide care. Total time spent on this encounter: Not billed by time    --Madelynn Bosworth, MD on 5/3/2021 at 3:06 PM    An electronic signature was used to authenticate this note.

## 2021-06-29 ENCOUNTER — OFFICE VISIT (OUTPATIENT)
Dept: FAMILY MEDICINE CLINIC | Age: 61
End: 2021-06-29
Payer: COMMERCIAL

## 2021-06-29 VITALS
DIASTOLIC BLOOD PRESSURE: 50 MMHG | TEMPERATURE: 97.3 F | HEART RATE: 64 BPM | BODY MASS INDEX: 20.87 KG/M2 | SYSTOLIC BLOOD PRESSURE: 84 MMHG | HEIGHT: 63 IN | OXYGEN SATURATION: 96 % | WEIGHT: 117.8 LBS
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
## 2021-06-29 DIAGNOSIS — L81.4 SKIN SPOTS-AGING: ICD-10-CM

## 2021-06-29 PROCEDURE — 3017F COLORECTAL CA SCREEN DOC REV: CPT | Performed by: FAMILY MEDICINE

## 2021-06-29 PROCEDURE — 1036F TOBACCO NON-USER: CPT | Performed by: FAMILY MEDICINE

## 2021-06-29 PROCEDURE — 99213 OFFICE O/P EST LOW 20 MIN: CPT | Performed by: FAMILY MEDICINE

## 2021-06-29 PROCEDURE — G8420 CALC BMI NORM PARAMETERS: HCPCS | Performed by: FAMILY MEDICINE

## 2021-06-29 PROCEDURE — G8427 DOCREV CUR MEDS BY ELIG CLIN: HCPCS | Performed by: FAMILY MEDICINE

## 2021-06-29 NOTE — PROGRESS NOTES
capsule Take 1 capsule by mouth daily 90 capsule 0    Menaquinone-7 (VITAMIN K2 PO) Take 1 tablet by mouth daily      Fluticasone Propionate (FLONASE NA) 1 spray by Nasal route daily as needed      polyethylene glycol (GLYCOLAX) powder Take 17 g by mouth daily      thyroid (ARMOUR THYROID) 15 MG tablet Take 1 tablet by mouth daily      RESTASIS 0.05 % ophthalmic emulsion Apply 1 drop to eye 2 times daily  1    Cholecalciferol (VITAMIN D3 PO) Take by mouth      valACYclovir (VALTREX) 1 g tablet 2 TABS BY MOUTH EVERY 12 HOURS HOURS X 1 DAY FOR COLD SORES (Patient not taking: Reported on 5/3/2021) 12 tablet 1     No current facility-administered medications on file prior to visit. Objective:           Physical Exam  Skin:             Vitals:    06/29/21 1056   BP: (!) 84/50   Pulse: 64   Temp: 97.3 °F (36.3 °C)   TempSrc: Infrared   SpO2: 96%   Weight: 117 lb 12.8 oz (53.4 kg)   Height: 5' 3\" (1.6 m)     Body mass index is 20.87 kg/m². Wt Readings from Last 3 Encounters:   06/29/21 117 lb 12.8 oz (53.4 kg)   12/17/20 116 lb (52.6 kg)   11/06/20 116 lb (52.6 kg)     BP Readings from Last 3 Encounters:   06/29/21 (!) 84/50   12/17/20 92/60   07/21/20 123/74          No results found for this visit on 06/29/21. Assessment:       Diagnosis Orders   1. Tick bite, initial encounter     2. Skin spots-aging             Plan:        Recommend that she wear long sleeves and a hat when she is outside. This will protect from sun and ticks. Reassurance given. Return if symptoms worsen or fail to improve.

## 2021-06-30 ASSESSMENT — ENCOUNTER SYMPTOMS
COUGH: 0
SHORTNESS OF BREATH: 0
VOMITING: 0

## 2021-06-30 NOTE — PATIENT INSTRUCTIONS
Need help scheduling your covid vaccine? [address] [phone]       PLEASE BRING YOUR MEDICATIONS TO ALL APPOINTMENTS    The diagnoses and medications listed in this after visit summary may not be accurate at the time of check out. Please check MY CHART in 28-48 hours for possible corrections. Late cancellation policy: So that we can better accommodate people who are sick, please give our office 24 hour notice for an appointment cancellation. Thank you. Missed appointments: Your care is very important to us. It is important that you keep your scheduled appointments. Multiple missed appointments will lead to a dismissal from the office. Later arrival policy: If you are more than 10 minutes late for your appointment, you will be asked to re-schedule. Please allow 5-7 business days for paperwork to be processed. It is important that you check your MY Chart messages, as they include appointment reminders, test results, and other important information. If you have forgotten your password, please call [phone]. HERE ARE SOME LIFE CHANGING TIPS      1. Take your blood pressure medications at bedtime. This reduces your chance of cardiovascular event by half  2. Fever in kids:  Give both Tylenol and Ibuprofen at the same time rather than staggering them which is confusing  3. Follow these tips to reduce childhood obesity: Reduce unnecessary exposure to antibiotics, consume whole milk instead of skim milk, watch public TV instead of regular TV (less exposure to junk food commercials), and reduce traumatic experiences. 4. 1 egg per day is good for your heart  5. Alternate day fasting does promote weight loss. Skipping breakfast increases your risk of obesity  6. Artificially sweetened drinks increase all cause mortality (strokes, BMI, cardiovascular)  7. Kale consumption can reduce onset of dementia  8.  Walking at least 8000 steps per day and resistance exercise 2-3 x per week are good for your heart  9.  Brushing teeth 3 times per day can decrease chance of getting diabetes

## 2021-07-21 DIAGNOSIS — F98.8 ATTENTION DEFICIT DISORDER (ADD) WITHOUT HYPERACTIVITY: ICD-10-CM

## 2021-07-22 RX ORDER — ATOMOXETINE 60 MG/1
CAPSULE ORAL
Qty: 90 CAPSULE | Refills: 0 | OUTPATIENT
Start: 2021-07-22

## 2021-08-09 ENCOUNTER — OFFICE VISIT (OUTPATIENT)
Dept: FAMILY MEDICINE CLINIC | Age: 61
End: 2021-08-09
Payer: COMMERCIAL

## 2021-08-09 VITALS
WEIGHT: 116.2 LBS | SYSTOLIC BLOOD PRESSURE: 110 MMHG | TEMPERATURE: 97.4 F | BODY MASS INDEX: 20.58 KG/M2 | OXYGEN SATURATION: 96 % | HEART RATE: 61 BPM | DIASTOLIC BLOOD PRESSURE: 70 MMHG

## 2021-08-09 DIAGNOSIS — B00.9 HSV-1 (HERPES SIMPLEX VIRUS 1) INFECTION: ICD-10-CM

## 2021-08-09 DIAGNOSIS — Z12.4 SCREENING FOR CERVICAL CANCER: Primary | ICD-10-CM

## 2021-08-09 DIAGNOSIS — F98.8 ATTENTION DEFICIT DISORDER (ADD) WITHOUT HYPERACTIVITY: ICD-10-CM

## 2021-08-09 PROCEDURE — 99396 PREV VISIT EST AGE 40-64: CPT | Performed by: FAMILY MEDICINE

## 2021-08-09 RX ORDER — ATOMOXETINE 60 MG/1
60 CAPSULE ORAL DAILY
Qty: 90 CAPSULE | Refills: 3 | Status: SHIPPED | OUTPATIENT
Start: 2021-08-09 | End: 2022-06-22 | Stop reason: SDUPTHER

## 2021-08-09 RX ORDER — VALACYCLOVIR HYDROCHLORIDE 1 G/1
TABLET, FILM COATED ORAL
Qty: 12 TABLET | Refills: 3 | Status: SHIPPED | OUTPATIENT
Start: 2021-08-09 | End: 2022-06-22 | Stop reason: SDUPTHER

## 2021-08-09 NOTE — PROGRESS NOTES
Juhi Gupta  YOB: 1960    Date of Service:  8/9/2021    Chief Complaint:   Raffy Antonio is a 63 y.o. female who presents for pap smear  HPI:    Here for pap    ADD: Janet Lozano is working well and would like to continue taking it.     Hx abnormal PAP: no  Sexual activity: none     Exercise: frequent  Seatbelt use:Yes  Lipid panel:   Lab Results   Component Value Date    CHOL 191 12/17/2020    TRIG 83 12/17/2020    HDL 78 (H) 12/17/2020    LDLCALC 96 12/17/2020    LDLDIRECT 100 (H) 07/06/2015      Wt Readings from Last 3 Encounters:   08/09/21 116 lb 3.2 oz (52.7 kg)   06/29/21 117 lb 12.8 oz (53.4 kg)   12/17/20 116 lb (52.6 kg)     BP Readings from Last 3 Encounters:   08/09/21 110/70   06/29/21 (!) 84/50   12/17/20 92/60       Patient Active Problem List   Diagnosis    Gluten-sensitive enteropathy    Allergic rhinitis    HSV-1 (herpes simplex virus 1) infection    Gastroesophageal reflux disease without esophagitis    Slow transit constipation    ADD (attention deficit disorder)    Family history of osteoporosis    Kyphosis    Impingement syndrome of right shoulder    Right rotator cuff tendinitis    Impaired glucose tolerance    Osteoarthritis of lumbar spine       Preventive Care:  Health Maintenance   Topic Date Due    Cervical cancer screen  06/09/2021    Flu vaccine (1) 09/01/2021    Breast cancer screen  06/29/2022    DTaP/Tdap/Td vaccine (2 - Td or Tdap) 12/10/2023    Lipid screen  12/17/2025    Colon cancer screen colonoscopy  07/30/2028    Shingles Vaccine  Completed    COVID-19 Vaccine  Completed    Hepatitis C screen  Completed    HIV screen  Completed    Hepatitis A vaccine  Aged Out    Hepatitis B vaccine  Aged Out    Hib vaccine  Aged Out    Meningococcal (ACWY) vaccine  Aged Out    Pneumococcal 0-64 years Vaccine  Aged [employer] History   Administered Date(s) Administered    COVID-19, Moderna, PF, 100mcg/0.5mL 01/06/2021, 02/03/2021 Social History     Socioeconomic History    Marital status:      Spouse name: Not on file    Number of children: Not on file    Years of education: Not on file    Highest education level: Not on file   Occupational History    Occupation: OT     Comment: [employer]   Tobacco Use    Smoking status: Never Smoker    Smokeless tobacco: Never Used   Substance and Sexual Activity    Alcohol use: Yes     Alcohol/week: 0.0 standard drinks     Comment: occas    Drug use: No    Sexual activity: Not Currently   Other Topics Concern    Not on file   Social History Narrative    Not on file     Social Determinants of Health     Financial Resource Strain:     Difficulty of Paying Living Expenses:    Food Insecurity:     Worried About Running Out of Food in the Last Year:     [address] in the Last Year:    Transportation Needs:     Lack of Transportation (Medical):  Lack of Transportation (Non-Medical):    Physical Activity:     Days of Exercise per Week:     Minutes of Exercise per Session:    Stress:     Feeling of Stress :    Social Connections:     Frequency of Communication with Friends and Family:     Frequency of Social Gatherings with Friends and Family:     Attends [employer] Services:     Active Member of Clubs or Organizations:     Attends Club or Organization Meetings:     Marital Status:    Intimate Partner Violence:     Fear of Current or Ex-Partner:     Emotionally Abused:     Physically Abused:     Sexually Abused:        Review of Systems:  A comprehensive review of systems was negative except for what was noted in the HPI. Physical Exam:   Vitals:    08/09/21 1055   BP: 110/70   Site: Left Upper Arm   Position: Sitting   Cuff Size: Medium Adult   Pulse: 61   Temp: 97.4 °F (36.3 °C)   TempSrc: Infrared   SpO2: 96%   Weight: 116 lb 3.2 oz (52.7 kg)     Body mass index is 20.58 kg/m². Constitutional: She is oriented to person, place, and time.  She appears well-developed and well-nourished. No distress. HEENT:   Head: Normocephalic and atraumatic. Right Ear: Tympanic membrane, external ear and ear canal normal.   Left Ear: Tympanic membrane, external ear and ear canal normal.  Neck: Neck supple. No JVD present. Carotid bruit is not present. No mass and no thyromegaly present. Cardiovascular: Normal rate, regular rhythm, normal heart sounds and intact distal pulses. Exam reveals no gallop and no friction rub. No murmur heard. Pulmonary/Chest: Effort normal and breath sounds normal. No respiratory distress. She has no wheezes, rhonchi or rales. Abdominal: Soft, non-tender. Bowel sounds and aorta are normal. She exhibits no organomegaly, mass or bruit. Genitourinary: , Vagina:    atrophic mucosa and Cervix:  Normal.  Breast exam:  breasts appear normal, no suspicious masses, no skin or nipple changes or axillary nodes. Musculoskeletal: Normal range of motion, no synovitis. She exhibits no edema. Neurological: She is alert and oriented to person, place, and time. She has normal reflexes. No cranial nerve deficit. Coordination normal.   Skin: Skin is warm and dry. There is no rash or erythema. No suspicious lesions noted. Psychiatric: She has a normal mood and affect. Her speech is normal and behavior is normal. Judgment, cognition and memory are normal.       Lab Review: not applicable     Assessment/Plan:    Wenceslao Rodriguez was seen today for add and gynecologic exam.    Diagnoses and all orders for this visit:    Screening for cervical cancer  -     PAP SMEAR    Attention deficit disorder (ADD) without hyperactivity  -     atomoxetine (STRATTERA) 60 MG capsule; Take 1 capsule by mouth daily    HSV-1 (herpes simplex virus 1) infection  -     valACYclovir (VALTREX) 1 g tablet; 2 TABS BY MOUTH EVERY 12 HOURS HOURS X 1 DAY FOR COLD SORES      Avoid supplemental calcium and vitamin D vitamins. Better to get this in diet.   .  Goal of 5 vegetables and fruits per day or half the plate be vegetables and fruits. 2-3 serving of dairy per day. Encourage aerobic exercise up to 150 minutes per week. Mammogram guidelines per USPTF recommendations. Mammogram was not ordered. Patient is considered to be medium for breast cancer.         Chaperone in room: Jimenez Gilman

## 2021-08-09 NOTE — PATIENT INSTRUCTIONS
Need help scheduling your covid vaccine? [address] [phone]       PLEASE BRING YOUR MEDICATIONS TO ALL APPOINTMENTS    The diagnoses and medications listed in this after visit summary may not be accurate at the time of check out. Please check MY CHART in 28-48 hours for possible corrections. Late cancellation policy: So that we can better accommodate people who are sick, please give our office 24 hour notice for an appointment cancellation. Thank you. Missed appointments: Your care is very important to us. It is important that you keep your scheduled appointments. Multiple missed appointments will lead to a dismissal from the office. Later arrival policy: If you are more than 10 minutes late for your appointment, you will be asked to re-schedule. Please allow 5-7 business days for paperwork to be processed. It is important that you check your MY Chart messages, as they include appointment reminders, test results, and other important information. If you have forgotten your password, please call [phone]. HERE ARE SOME LIFE CHANGING TIPS      1. Take your blood pressure medications at bedtime. This reduces your chance of cardiovascular event by half  2. Fever in kids:  Give both Tylenol and Ibuprofen at the same time rather than staggering them which is confusing  3. Follow these tips to reduce childhood obesity: Reduce unnecessary exposure to antibiotics, consume whole milk instead of skim milk, watch public TV instead of regular TV (less exposure to junk food commercials), and reduce traumatic experiences. 4. 1 egg per day is good for your heart  5. Alternate day fasting does promote weight loss. Skipping breakfast increases your risk of obesity  6. Artificially sweetened drinks increase all cause mortality (strokes, BMI, cardiovascular)  7. Kale consumption can reduce onset of dementia  8.  Walking at least 8000 steps per day and resistance exercise 2-3 x per week are good for your heart  9. Brushing teeth 3 times per day can decrease chance of getting diabetes      Patient Education        Learning About Vitamin D  Why is it important to get enough vitamin D? Your body needs vitamin D to absorb calcium. Calcium keeps your bones and muscles, including your heart, healthy and strong. If your muscles don't get enough calcium, they can cramp, hurt, or feel weak. You may have long-term (chronic) muscle aches and pains. If you don't get enough vitamin D throughout life, you have an increased chance of having thin and brittle bones (osteoporosis) in your later years. Children who don't get enough vitamin D may not grow as much as others their age. They also have a chance of getting a rare disease called rickets. It causes weak bones. Vitamin D and calcium are added to many foods. And your body uses sunshine to make its own vitamin D. How much vitamin D do you need? The recommended daily allowance (RDA) for vitamin D is 600 IU (international units) every day for people ages 3 through 79. Adults 71 and older need 800 IU every day. Blood tests for vitamin D can check your vitamin D level. But there is no standard normal range used by all laboratories. You're likely getting enough vitamin D if your levels are in the range of 20 to 50 ng/mL. How can you get more vitamin D? Foods that contain vitamin D include:  · [place], tuna, and mackerel. These are some of the best foods to eat when you need to get more vitamin D.  · Cheese, egg yolks, and beef liver. These foods have vitamin D in small amounts. · Milk, soy drinks, orange juice, yogurt, margarine, and some kinds of cereal have vitamin D added to them. Some people don't make vitamin D as well as others. They may have to take extra care in getting enough vitamin D. Things that reduce how much vitamin D your body makes include:  · Dark skin, such as many  Americans have.   · Age, especially if you are older than 65. · Digestive problems, such as Crohn's or celiac disease. · Liver and kidney disease. Some people who do not get enough vitamin D may need supplements. Are there any risks from taking vitamin D?  · Too much vitamin D:  ? Can damage your kidneys. ? Can cause nausea and vomiting, constipation, and weakness. ? Raises the amount of calcium in your blood. If this happens, you can get confused or have an irregular heart rhythm. · Vitamin D may interact with other medicines. Tell your doctor about all of the medicines you take, including over-the-counter drugs, herbs, and pills. Tell your doctor about all of your current medical problems. Where can you learn more? Go to https://[employer] StudiospePromodity.[employer]. org and sign in to your [employer] account. Enter [id number] in the [employer] box to learn more about \"Learning About Vitamin D. \"     If you do not have an account, please click on the \"Sign Up Now\" link. Current as of: December 17, 2020               Content Version: 12.9  © 2006-2021 Healthwise, Incorporated. Care instructions adapted under license by [facility] ([facility]). If you have questions about a medical condition or this instruction, always ask your healthcare professional. [place] [address] any warranty or liability for your use of this information.

## 2021-09-08 ENCOUNTER — OFFICE VISIT (OUTPATIENT)
Dept: FAMILY MEDICINE CLINIC | Age: 61
End: 2021-09-08
Payer: COMMERCIAL

## 2021-09-08 ENCOUNTER — HOSPITAL ENCOUNTER (OUTPATIENT)
Age: 61
Setting detail: SPECIMEN
Discharge: HOME | End: 2021-09-08
Payer: COMMERCIAL

## 2021-09-08 VITALS — OXYGEN SATURATION: 98 % | TEMPERATURE: 96.9 F | HEART RATE: 76 BPM

## 2021-09-08 DIAGNOSIS — R05.9 COUGH: Primary | ICD-10-CM

## 2021-09-08 DIAGNOSIS — R53.83 FATIGUE, UNSPECIFIED TYPE: ICD-10-CM

## 2021-09-08 DIAGNOSIS — R09.81 NASAL CONGESTION: ICD-10-CM

## 2021-09-08 PROCEDURE — G8420 CALC BMI NORM PARAMETERS: HCPCS | Performed by: NURSE PRACTITIONER

## 2021-09-08 PROCEDURE — 3017F COLORECTAL CA SCREEN DOC REV: CPT | Performed by: NURSE PRACTITIONER

## 2021-09-08 PROCEDURE — 99213 OFFICE O/P EST LOW 20 MIN: CPT | Performed by: NURSE PRACTITIONER

## 2021-09-08 PROCEDURE — 1036F TOBACCO NON-USER: CPT | Performed by: NURSE PRACTITIONER

## 2021-09-08 PROCEDURE — U0003 INFECTIOUS AGENT DETECTION BY NUCLEIC ACID (DNA OR RNA); SEVERE ACUTE RESPIRATORY SYNDROME CORONAVIRUS 2 (SARS-COV-2) (CORONAVIRUS DISEASE [COVID-19]), AMPLIFIED PROBE TECHNIQUE, MAKING USE OF HIGH THROUGHPUT TECHNOLOGIES AS DESCRIBED BY CMS-2020-01-R: HCPCS

## 2021-09-08 PROCEDURE — U0005 INFEC AGEN DETEC AMPLI PROBE: HCPCS

## 2021-09-08 PROCEDURE — G8427 DOCREV CUR MEDS BY ELIG CLIN: HCPCS | Performed by: NURSE PRACTITIONER

## 2021-09-08 NOTE — PROGRESS NOTES
9/8/21  Maria T Gupta  1960    FLU/COVID-19 CLINIC EVALUATION    HPI SYMPTOMS:    Employer: [address] [employer]  [] Fevers  [] Chills  [x] Cough  [] Coughing up blood  [] Chest Congestion  [x] Nasal Congestion  [] Feeling short of breath  [] Sometimes  [] Frequently  [] All the time  [] Chest pain  [] Headaches  []Tolerable  [] Severe  [x] Sore throat  [] Muscle aches  [] Nausea  [] Vomiting  []Unable to keep fluids down  [] Diarrhea  []Severe    [x] OTHER SYMPTOMS:  Fatigue  Symptom Duration:   [] 1  [] 2   [] 3   [x] 4    [] 5   [] 6   [] 7   [] 8   [] 9   [] 10   [] 11   [] 12   [] 13   [] 14   [] Longer than 14 days    Symptom course:   [x] Worsening     [] Stable     [] Improving    RISK FACTORS:    [] Pregnant or possibly pregnant  [x] Age over 61  [] Diabetes  [] Heart disease  [] Asthma  [] COPD/Other chronic lung diseases  [] Active Cancer  [] On Chemotherapy  [] Taking oral steroids  [] History Lymphoma/Leukemia  [] Close contact with a lab confirmed COVID-19 patient within 14 days of symptom onset  [] History of travel from affected geographical areas within 14 days of symptom onset       VITALS:  There were no vitals filed for this visit. TESTS:    POCT FLU:  [] Positive     []Negative    ASSESSMENT:    [] Flu  [] Possible COVID-19  [] Strep    PLAN:    [] Discharge home with written instructions for:  [] Flu management  [] Possible COVID-19 infection with self-quarantine and management of symptoms  [] Follow-up with primary care physician or emergency department if worsens  [] Evaluation per physician/NP/PA in clinic  [] Sent to ER       An  electronic signature was used to authenticate this note.      --Hayley Nava LPN on 6/2/9854 at 3:17 PM

## 2021-09-08 NOTE — PROGRESS NOTES
9/8/2021    HPI:  Chief complaint and history of present illness as per medical assistant/nurse documented today in the Flu/COVID-19 clinic. Patient is here with complaints of cough, nasal congestion, sore throat, and fatigue x 4 days. Patient states she has had her covid vaccine. MEDICATIONS:  Prior to Visit Medications    Medication Sig Taking? Authorizing Provider   atomoxetine (STRATTERA) 60 MG capsule Take 1 capsule by mouth daily  Augusto Qureshi MD   valACYclovir (VALTREX) 1 g tablet 2 TABS BY MOUTH EVERY 12 HOURS HOURS X 1 DAY FOR COLD SORES  Augusto Qureshi MD   Menaquinone-7 (VITAMIN K2 PO) Take 1 tablet by mouth daily  Historical Provider, MD   polyethylene glycol (GLYCOLAX) powder Take 17 g by mouth daily  Historical Provider, MD   thyroid (ARMOUR THYROID) 15 MG tablet Take 1 tablet by mouth daily  Historical Provider, MD   RESTASIS 0.05 % ophthalmic emulsion Apply 1 drop to eye 2 times daily  Historical Provider, MD   Cholecalciferol (VITAMIN D3 PO) Take by mouth  Historical Provider, MD       Allergies   Allergen Reactions    Gluten Meal     Seasonal    ,   Past Medical History:   Diagnosis Date    ADD (attention deficit disorder)     Celiac disease     Nausea    ,   Past Surgical History:   Procedure Laterality Date    COLONOSCOPY  2008    COLONOSCOPY  07/27/2018    repeat in 10 years    ENDOSCOPY, COLON, DIAGNOSTIC      PATELLA SURGERY Left 2004    SHOULDER SURGERY Right 2015    Fester    TONSILLECTOMY      WISDOM TOOTH EXTRACTION     ,   Social History     Tobacco Use    Smoking status: Never Smoker    Smokeless tobacco: Never Used   Substance Use Topics    Alcohol use:  Yes     Alcohol/week: 0.0 standard drinks     Comment: occas    Drug use: No   ,   Family History   Problem Relation Age of Onset    Dementia Mother 79        Picks Disease    Celiac Disease Mother     Osteoporosis Mother     Heart Attack Father 68    Cancer Father         Lymphoma    Thyroid Disease Sister Skin:     General: Skin is warm and dry. Neurological:      Mental Status: She is alert and oriented to person, place, and time. Psychiatric:         Mood and Affect: Mood normal.         Behavior: Behavior normal.         ASSESSMENT/PLAN:  1. Cough  Your COVID 19 test can take 1-5 days for the results to come back. We ask that you make a Mychart page and view your test results this way. You will need to Self quarantine until you know your results. Increase fluids and rest  Saline nasal spray as needed for nasal congestion  Warm salt gargles as needed for throat discomfort  Monitor temperature twice a day  Tylenol as needed for fevers and/or discomfort. Big deep breaths periodically throughout the day  Regular Mucinex over the counter as needed for chest congestion  If symptoms worsen -Go to the ER. Follow up with your primary care provider  - Covid-19 Ambulatory    2. Nasal congestion  - Covid-19 Ambulatory    3. Fatigue, unspecified type  - Covid-19 Ambulatory      FOLLOW-UP:  Return if symptoms worsen or fail to improve.     In addition to other information, the printed after visit summary provided to the patient includes:  [x] COVID-19 Self care instructions  [x] COVID-19 General patient information

## 2021-09-08 NOTE — PATIENT INSTRUCTIONS
Your COVID 19 test can take 1-5 days for the results to come back. We ask that you make a Mychart page and view your test results this way. You will need to Self quarantine until you know your results. Increase fluids and rest  Saline nasal spray as needed for nasal congestion  Warm salt gargles as needed for throat discomfort  Monitor temperature twice a day  Tylenol as needed for fevers and/or discomfort. Big deep breaths periodically throughout the day  Regular Mucinex over the counter as needed for chest congestion  If symptoms worsen -Go to the ER. Follow up with your primary care provider      To Whom it May Concern:    Angeli Brown was tested for COVID-19 9/8/2021. He/she must stay home until test results are back. If test is positive, he/she must quarantine for a total of 10 days starting from day one of symptom onset. He/she must also be fever-free for 24 hours at that time, and also have improvement in symptoms. We do not recommend retesting as patients may continue to test positive for months even though no longer contagious. It is suggested you call [address] or  Alex Moraga with any questions regarding quarantine timeframe/return to work/school details.

## 2021-09-10 LAB
SARS-COV-2: NOT DETECTED
SOURCE: NORMAL

## 2021-11-05 ENCOUNTER — TELEPHONE (OUTPATIENT)
Dept: FAMILY MEDICINE CLINIC | Age: 61
End: 2021-11-05

## 2021-11-05 DIAGNOSIS — G89.29 CHRONIC PAIN OF RIGHT KNEE: Primary | ICD-10-CM

## 2021-11-05 DIAGNOSIS — M25.561 CHRONIC PAIN OF RIGHT KNEE: Primary | ICD-10-CM

## 2021-11-05 NOTE — TELEPHONE ENCOUNTER
Patient stated that she talked to you about Dr. Stephanie Montiel doing another injection on her right knee. Patient stated you told her it was okay to do so.  Patient stated she is needing a referral to see Dr. Stephanie Montiel

## 2021-11-12 ENCOUNTER — OFFICE VISIT (OUTPATIENT)
Dept: ORTHOPEDIC SURGERY | Age: 61
End: 2021-11-12
Payer: COMMERCIAL

## 2021-11-12 ENCOUNTER — TELEPHONE (OUTPATIENT)
Dept: ORTHOPEDIC SURGERY | Age: 61
End: 2021-11-12

## 2021-11-12 VITALS
TEMPERATURE: 97.3 F | RESPIRATION RATE: 17 BRPM | OXYGEN SATURATION: 97 % | HEART RATE: 88 BPM | HEIGHT: 63 IN | WEIGHT: 116 LBS | BODY MASS INDEX: 20.55 KG/M2

## 2021-11-12 DIAGNOSIS — M17.11 PRIMARY OSTEOARTHRITIS OF RIGHT KNEE: Primary | ICD-10-CM

## 2021-11-12 PROCEDURE — G8420 CALC BMI NORM PARAMETERS: HCPCS | Performed by: ORTHOPAEDIC SURGERY

## 2021-11-12 PROCEDURE — 99213 OFFICE O/P EST LOW 20 MIN: CPT | Performed by: ORTHOPAEDIC SURGERY

## 2021-11-12 PROCEDURE — G8484 FLU IMMUNIZE NO ADMIN: HCPCS | Performed by: ORTHOPAEDIC SURGERY

## 2021-11-12 PROCEDURE — 1036F TOBACCO NON-USER: CPT | Performed by: ORTHOPAEDIC SURGERY

## 2021-11-12 PROCEDURE — G8427 DOCREV CUR MEDS BY ELIG CLIN: HCPCS | Performed by: ORTHOPAEDIC SURGERY

## 2021-11-12 PROCEDURE — 3017F COLORECTAL CA SCREEN DOC REV: CPT | Performed by: ORTHOPAEDIC SURGERY

## 2021-11-12 NOTE — PATIENT INSTRUCTIONS
Continue weight-bearing as tolerated. Continue range of motion exercises as instructed. Ice and elevate as needed. Tylenol or Motrin for pain  Gel injections will be sent for authorization today.

## 2021-11-12 NOTE — PROGRESS NOTES
Patient returns today with right knee pain. Pt states she has had this pain for years and in September of last year she was getting gel injections and received relief. Pt states her pain is typically after she has been biking or hiking. Pt states she will not take any medicine for the pain but will use ice after her workouts. Pt states she will wear a brace to help stabilize. Pt rates her pain today a 5/10 and it will limit her daiy activities.

## 2021-11-13 ASSESSMENT — ENCOUNTER SYMPTOMS
CHEST TIGHTNESS: 0
COLOR CHANGE: 0
SHORTNESS OF BREATH: 0

## 2021-11-13 NOTE — PROGRESS NOTES
11/12/2021   Chief Complaint   Patient presents with    Knee Pain     right        History of Present Illness:                             Javy Sahu is a 57 y.o. female who returns today for follow-up of her right knee pain and stiffness. She continues to be active but has some mild return of her aching soreness and discomfort along the anterior and medial joint lines worse with repetitive activities. She feels that she responded well to the viscosupplementation's that were performed 1 year ago. Patient returns today with right knee pain. Pt states she has had this pain for years and in September of last year she was getting gel injections and received relief. Pt states her pain is typically after she has been biking or hiking. Pt states she will not take any medicine for the pain but will use ice after her workouts. Pt states she will wear a brace to help stabilize. Pt rates her pain today a 5/10 and it will limit her daiy activities. Medical History  Patient's medications, allergies, past medical, surgical, social and family histories were reviewed and updated as appropriate. Review of Systems   Constitutional: Negative for activity change and fever. Respiratory: Negative for chest tightness and shortness of breath. Cardiovascular: Negative for chest pain. Skin: Negative for color change. Neurological: Negative for dizziness. Psychiatric/Behavioral: The patient is not nervous/anxious. Examination:  General Exam:  Vitals: Pulse 88   Temp 97.3 °F (36.3 °C)   Resp 17   Ht 5' 3\" (1.6 m)   Wt 116 lb (52.6 kg)   LMP 11/09/2011 (Exact Date)   SpO2 97%   BMI 20.55 kg/m²    Physical Exam  Vitals and nursing note reviewed. Constitutional:       Appearance: Normal appearance. HENT:      Head: Normocephalic and atraumatic.    Eyes:      Conjunctiva/sclera: Conjunctivae normal.      Pupils: Pupils are equal, round, and reactive to intact. Normal knee alignment. Pulses intact. Skin:     General: Skin is warm and dry. Neurological:      Mental Status: She is alert and oriented to person, place, and time. Psychiatric:         Mood and Affect: Mood normal.         Behavior: Behavior normal.              Diagnostic testing:  X-rays reviewed in office, I independently reviewed the films in the office today:     XR KNEE RIGHT (MIN 4 VIEWS)    Result Date: 11/12/2021  4 views of the Right Knee: There is evidence of mild degenerative changes most significant in the medial compartment with mild joint space narrowing. Minimal osteophyte formation along the medial compartment. No cystic changes or bony erosions. Normal alignment. No fracture present. Normal bone density. No soft tissue swelling Impression: Mild medial compartment arthritic changes       Office Procedures:  No orders of the defined types were placed in this encounter. Assessment and Plan  1. Right knee mild primary osteoarthritis    We reviewed her x-rays and will recommend that we continue with conservative nonoperative management of her degenerative joint disease. She has responded well in the past to the viscosupplementation injections but they have subsequently worn off. She would like to proceed with a repeat round of gel injections. We will seek approval for the series of 3 injections and she will return once they are approved. Continue with her current conservative treatment plan.         Electronically signed by Dread Nur MD on 11/13/2021 at 3:04 PM

## 2021-11-24 NOTE — TELEPHONE ENCOUNTER
I called [facility] [phone] and spoke with Danni CUNNINGHAM And was informed that no Emry Massed is needed for [facility] .      Outpatient   Loy Ashley  In network  Dr Haley Valle [phone]  M17.11  [phone]

## 2021-12-02 NOTE — TELEPHONE ENCOUNTER
Medication labeled and in stock.  I called pt and LMOVM to call our office back to schedule injections for Right knee

## 2021-12-13 ENCOUNTER — OFFICE VISIT (OUTPATIENT)
Dept: ORTHOPEDIC SURGERY | Age: 61
End: 2021-12-13
Payer: COMMERCIAL

## 2021-12-13 VITALS
WEIGHT: 116 LBS | RESPIRATION RATE: 17 BRPM | HEART RATE: 84 BPM | OXYGEN SATURATION: 94 % | HEIGHT: 63 IN | BODY MASS INDEX: 20.55 KG/M2

## 2021-12-13 DIAGNOSIS — M17.11 PRIMARY OSTEOARTHRITIS OF RIGHT KNEE: Primary | ICD-10-CM

## 2021-12-13 PROCEDURE — 20610 DRAIN/INJ JOINT/BURSA W/O US: CPT | Performed by: PHYSICIAN ASSISTANT

## 2021-12-13 PROCEDURE — 99999 PR OFFICE/OUTPT VISIT,PROCEDURE ONLY: CPT | Performed by: PHYSICIAN ASSISTANT

## 2021-12-13 NOTE — PROGRESS NOTES
VISCO-SUPPLEMENTATION INJECTION (Number 1)    HISTORY OF PRESENT ILLNESS (HPI):   Deborah Pate is a 57y.o. year old female who is here for follow up for visco-supplementation injection number 1 for    Diagnosis Orders   1. Primary osteoarthritis of right knee         PAST HISTORY:   Unless otherwise specified in this note, the past history is reviewed today with the patient and is as follows-    Allergies   Allergen Reactions    Gluten Meal     Seasonal        Current Outpatient Medications   Medication Sig Dispense Refill    atomoxetine (STRATTERA) 60 MG capsule Take 1 capsule by mouth daily 90 capsule 3    Menaquinone-7 (VITAMIN K2 PO) Take 1 tablet by mouth daily      polyethylene glycol (GLYCOLAX) powder Take 17 g by mouth daily      thyroid (ARMOUR THYROID) 15 MG tablet Take 1 tablet by mouth daily      RESTASIS 0.05 % ophthalmic emulsion Apply 1 drop to eye 2 times daily  1    Cholecalciferol (VITAMIN D3 PO) Take by mouth      valACYclovir (VALTREX) 1 g tablet 2 TABS BY MOUTH EVERY 12 HOURS HOURS X 1 DAY FOR COLD SORES (Patient not taking: Reported on 11/12/2021) 12 tablet 3     No current facility-administered medications for this visit. PHYSICAL EXAM:   Pulse 84   Resp 17   Ht 5' 3.25\" (1.607 m)   Wt 116 lb (52.6 kg)   LMP 11/09/2011 (Exact Date)   SpO2 94%   BMI 20.39 kg/m²     The right knee is examined:  Inspection:  Skin is intact. No erythema. Palpation:  No swelling or acute tenderness. Neuro/Vascular/Motor:  Sensation to light touch intact. Capillary refill brisk. No focal motor deficits. DIAGNOSIS:     1. Primary osteoarthritis of right knee        PROCEDURE:   Right Knee Aspiration / Injection Procedure:  Multiple treatment options were discussed. Joint injection was recommended. Details of the procedure, potential risks, and potential benefits were discussed. Patient's questions were answered. Patient elected to proceed with procedure.   Medication: Gelsyn 3 2 mL  NDC #: [id number]  Lot #: [id number]  Procedure:  Sterile technique was used as the skin over the injection site was prepped with alcohol then the knee joint was then injected with the above listed medication. A sterile bandage was placed over the injection site. The patient tolerated the procedure well without complication. The patient is scheduled for the second injection in one week.

## 2021-12-20 ENCOUNTER — OFFICE VISIT (OUTPATIENT)
Dept: ORTHOPEDIC SURGERY | Age: 61
End: 2021-12-20
Payer: COMMERCIAL

## 2021-12-20 VITALS — HEART RATE: 98 BPM | OXYGEN SATURATION: 99 % | RESPIRATION RATE: 17 BRPM

## 2021-12-20 DIAGNOSIS — M17.11 PRIMARY OSTEOARTHRITIS OF RIGHT KNEE: Primary | ICD-10-CM

## 2021-12-20 PROCEDURE — 20610 DRAIN/INJ JOINT/BURSA W/O US: CPT | Performed by: PHYSICIAN ASSISTANT

## 2021-12-20 NOTE — PATIENT INSTRUCTIONS
Gelsyn 3 # 2  Rest both knees for 24-48 hours  Work on ROM and strengthening of knees and legs   May take NSAIDS or anti-inflammatories as needed  Weightbearing as tolerated  Follow up in 1 one for your 3rd injection

## 2021-12-20 NOTE — PROGRESS NOTES
Pt is here for 2nd round of gelsyn, states the injection has helped. Pain scale is 0/10 currently but it does get sore when she is walking up and down stairs or walking around for an extended amount of time.  No concerns

## 2021-12-20 NOTE — PROGRESS NOTES
VISCO-SUPPLEMENTATION INJECTION (Number 2)  I reviewed and agree with the portions of the HPI, review of systems, vital documentation and plan performed by my staff and have added/addended where appropriate. HISTORY OF PRESENT ILLNESS (HPI):   Portillo Choe is a 57y.o. year old female who is here for follow up for visco-supplementation injection number 2 for    Diagnosis Orders   1. Primary osteoarthritis of right knee         PAST HISTORY:   Unless otherwise specified in this note, the past history is reviewed today with the patient and is as follows-    Allergies   Allergen Reactions    Gluten Meal     Seasonal        Current Outpatient Medications   Medication Sig Dispense Refill    atomoxetine (STRATTERA) 60 MG capsule Take 1 capsule by mouth daily 90 capsule 3    valACYclovir (VALTREX) 1 g tablet 2 TABS BY MOUTH EVERY 12 HOURS HOURS X 1 DAY FOR COLD SORES 12 tablet 3    Menaquinone-7 (VITAMIN K2 PO) Take 1 tablet by mouth daily      polyethylene glycol (GLYCOLAX) powder Take 17 g by mouth daily      thyroid (ARMOUR THYROID) 15 MG tablet Take 1 tablet by mouth daily      RESTASIS 0.05 % ophthalmic emulsion Apply 1 drop to eye 2 times daily  1    Cholecalciferol (VITAMIN D3 PO) Take by mouth       No current facility-administered medications for this visit. PHYSICAL EXAM:   Pulse 98   Resp 17   LMP 11/09/2011 (Exact Date)   SpO2 99%     The right knee is examined:  Inspection:  Skin is intact. No erythema. Palpation:  No swelling or acute tenderness. Neuro/Vascular/Motor:  Sensation to light touch intact. Capillary refill brisk. No focal motor deficits. DIAGNOSIS:     1. Primary osteoarthritis of right knee        PROCEDURE:   Right Knee Aspiration / Injection Procedure:  Multiple treatment options were discussed. Joint injection was recommended. Details of the procedure, potential risks, and potential benefits were discussed. Patient's questions were answered.   Patient elected

## 2021-12-30 ENCOUNTER — OFFICE VISIT (OUTPATIENT)
Dept: ORTHOPEDIC SURGERY | Age: 61
End: 2021-12-30
Payer: COMMERCIAL

## 2021-12-30 VITALS
BODY MASS INDEX: 20.55 KG/M2 | OXYGEN SATURATION: 97 % | HEIGHT: 63 IN | RESPIRATION RATE: 16 BRPM | HEART RATE: 103 BPM | WEIGHT: 116 LBS

## 2021-12-30 DIAGNOSIS — M17.11 PRIMARY OSTEOARTHRITIS OF RIGHT KNEE: ICD-10-CM

## 2021-12-30 PROCEDURE — 20610 DRAIN/INJ JOINT/BURSA W/O US: CPT | Performed by: PHYSICIAN ASSISTANT

## 2021-12-30 NOTE — PROGRESS NOTES
were discussed. Patient's questions were answered. Patient elected to proceed with procedure. Medication: Gelsyn 3, 2 mL  NDC #: [id number]  Lot #: [id number]  Procedure:  Sterile technique was used as the skin over the injection site was prepped with alcohol then the knee joint was then injected with the above listed medication. A sterile bandage was placed over the injection site. The patient tolerated the procedure well without complication. The patient is scheduled as needed.

## 2021-12-30 NOTE — PATIENT INSTRUCTIONS
Stephan 3 # 3  Rest both knees for 24-48 hours  Work on ROM and strengthening of knees and legs   May take NSAIDS or anti-inflammatories as needed  Weightbearing as tolerated  Follow up as needed

## 2022-04-06 ENCOUNTER — OFFICE VISIT (OUTPATIENT)
Dept: FAMILY MEDICINE CLINIC | Age: 62
End: 2022-04-06
Payer: COMMERCIAL

## 2022-04-06 VITALS
HEART RATE: 84 BPM | WEIGHT: 119.8 LBS | OXYGEN SATURATION: 98 % | DIASTOLIC BLOOD PRESSURE: 60 MMHG | SYSTOLIC BLOOD PRESSURE: 110 MMHG | BODY MASS INDEX: 21.23 KG/M2 | HEIGHT: 63 IN

## 2022-04-06 DIAGNOSIS — Z91.09 ENVIRONMENTAL ALLERGIES: Primary | ICD-10-CM

## 2022-04-06 DIAGNOSIS — M53.3 SACRO ILIAL PAIN: ICD-10-CM

## 2022-04-06 PROCEDURE — G8420 CALC BMI NORM PARAMETERS: HCPCS | Performed by: FAMILY MEDICINE

## 2022-04-06 PROCEDURE — 3017F COLORECTAL CA SCREEN DOC REV: CPT | Performed by: FAMILY MEDICINE

## 2022-04-06 PROCEDURE — 99213 OFFICE O/P EST LOW 20 MIN: CPT | Performed by: FAMILY MEDICINE

## 2022-04-06 PROCEDURE — G8427 DOCREV CUR MEDS BY ELIG CLIN: HCPCS | Performed by: FAMILY MEDICINE

## 2022-04-06 PROCEDURE — 1036F TOBACCO NON-USER: CPT | Performed by: FAMILY MEDICINE

## 2022-04-06 RX ORDER — FLUTICASONE PROPIONATE 50 MCG
2 SPRAY, SUSPENSION (ML) NASAL DAILY
Qty: 3 G | Refills: 3 | Status: SHIPPED | OUTPATIENT
Start: 2022-04-06

## 2022-04-06 NOTE — PATIENT INSTRUCTIONS
PLEASE BRING YOUR MEDICATIONS TO ALL APPOINTMENTS      Please check MY CHART for test results. If you have forgotten your password, please call [phone]. The diagnoses and medications listed in this after visit summary may not be up to date. Please check MY CHART in 28-48 hours for possible corrections. Late cancellation policy: So that we can better accommodate people who are sick, please give our office 24 hour notice for an appointment cancellation. Missed appointments: Your care is very important to us. It is important that you keep your scheduled appointments. Multiple missed appointments will lead to a dismissal from the office. Patients arriving late will be worked into the schedule as time permits, with patients arriving on time taken as scheduled. Late arriving patients are more than welcome to wait or reschedule their appointments. Please allow 5-7 business days for paperwork to be processed. HERE ARE SOME LIFE CHANGING TIPS      1. Take your blood pressure medications at bedtime to reduce your chance of heart attack or stroke  2. Fever in kids:  Give both Tylenol and Ibuprofen at the same time rather than staggering them   3. Follow these tips to reduce childhood obesity: Reduce unnecessary exposure to antibiotics, consume whole milk instead of skim milk, watch public TV instead of regular TV (less exposure to junk food commercials), and reduce traumatic experiences. 4. 1 egg per day is good for your heart  5. Alternate day fasting does promote weight loss. Skipping breakfast increases your risk of obesity  6. Artificially sweetened drinks increase all cause mortality (strokes, body mass index, cardiovascular disease)  7. Kale consumption can reduce onset of dementia  8. Walking at least 8000 steps per day and resistance exercise 2-3 x per week are good for your heart  9. Brushing teeth 3 times per day can decrease chance of getting diabetes  10.  Antibiotic use is associated with a lifetime increased risk of breast cancer and heart disease. Patient Education        azelastine nasal  Pronunciation: lucas maxwell  Brand: Abeilno  What is the most important information I should know about azelastine nasal?  Follow all directions on the label and package. Use exactly as directed. What is azelastine nasal?  Azelastine nasal (for the nose) is an antihistamine that is used to treatsneezing, runny or stuffy nose, itching, and other nasal symptoms of allergies. Azelastine nasal is for use in adults and children at least 10years old. Azelastine nasal may also be used for purposes not listed in this medicationguide. What should I discuss with my healthcare provider before using azelastine nasal?  Ask a doctor before using this medicine if you are pregnant or breastfeeding. How should I use azelastine nasal?  Use exactly as directed on the label, or as prescribed by your doctor. Before your first use, squeeze the bottle 2 or more times away from your face, until a fine mist appears. Repeat this whenever the nasal spray hasn't beenused for longer than 3 days. To use the nasal spray:   With your head upright insert the tip of the bottle into the nostril and press your other nostril closed. Breathe in and gently spray the medicine into your nose. Use in your other nostril if directed.  If the spray gets in your eyes or mouth or on your skin, rinse with water.  Do not blow your nose for a few minutes. Do not allow a young child to use this medicine without help from an adult. Call your doctor if your symptoms do not improve, or if they get worse. Store this medicine in an upright position at room temperature. Do not freeze. Throw away the medicine after you have used 200 sprays, even if there is stillmedicine left in the bottle. What happens if I miss a dose? Use the medicine as soon as you can, but skip the missed dose if it is almost time for your next dose. and all other medicines out of the reach of children, never share your medicines with others, and use this medication only for the indication prescribed. Every effort has been made to ensure that the information provided by [address] is accurate, up-to-date, and complete, but no guarantee is made to that effect. Drug information contained herein may be time sensitive. [facility] information has been compiled for use by healthcare practitioners and consumers in the [place] and therefore [facility] does not warrant that uses outside of the [place] are appropriate, unless specifically indicated otherwise. [facility]'s drug information does not endorse drugs, diagnose patients or recommend therapy. [facility]'s drug information is an informational resource designed to assist licensed healthcare practitioners in caring for their patients and/or to serve consumers viewing this service as a supplement to, and not a substitute for, the expertise, skill, knowledge and judgment of healthcare practitioners. The absence of a warning for a given drug or drug combination in no way should be construed to indicate that the drug or drug combination is safe, effective or appropriate for any given patient. [facility] does not assume any responsibility for any aspect of healthcare administered with the aid of information [facility] provides. The information contained herein is not intended to cover all possible uses, directions, precautions, warnings, drug interactions, allergic reactions, or adverse effects. If you have questions about the drugs you are taking, check with yourdoctor, nurse or pharmacist.  Copyright 5600-[address]25 [address]. Version: 11.01. Revision date:6/21/2021. Care instructions adapted under license by [facility] ([facility]).  If you have questions about a medical condition or this instruction, always ask your healthcare professional. [place] [address] any warranty or liability for your use of this information.

## 2022-04-06 NOTE — PROGRESS NOTES
Patient ID: Candice Gupta 1960    . Chief Complaint   Patient presents with    Referral - General     Wants a referral to A physical Therapy, Feeling Instability     Allergies     wants Flonase unsented          HPI     SI joint problem: Has been seeing chiropractor. Friend told her that she would be better off seeing physical therapist.    Allergies: Has allergies to multiple things. Has had extensive work-up. Has even had ENT evaluation. She simply asking for Flonase. Takes an unknown allergy pill occasionally.     Review of Systems    Patient Active Problem List   Diagnosis    Gluten-sensitive enteropathy    Allergic rhinitis    HSV-1 (herpes simplex virus 1) infection    Gastroesophageal reflux disease without esophagitis    Slow transit constipation    ADD (attention deficit disorder)    Family history of osteoporosis    Kyphosis    Impingement syndrome of right shoulder    Right rotator cuff tendinitis    Impaired glucose tolerance    Osteoarthritis of lumbar spine       Past Surgical History:   Procedure Laterality Date    COLONOSCOPY  2008    COLONOSCOPY  07/27/2018    repeat in 10 years    ENDOSCOPY, COLON, DIAGNOSTIC      PATELLA SURGERY Left 2004    SHOULDER SURGERY Right 2015    Fester    TONSILLECTOMY      WISDOM TOOTH EXTRACTION         Family History   Problem Relation Age of Onset    Dementia Mother 79        Picks Disease    Celiac Disease Mother     Osteoporosis Mother     Heart Attack Father 68    Cancer Father         Lymphoma    Thyroid Disease Sister         Graves    Osteoporosis Sister     Thyroid Disease Sister     Celiac Disease Sister     Rheum Arthritis Sister        Current Outpatient Medications on File Prior to Visit   Medication Sig Dispense Refill    atomoxetine (STRATTERA) 60 MG capsule Take 1 capsule by mouth daily 90 capsule 3    valACYclovir (VALTREX) 1 g tablet 2 TABS BY MOUTH EVERY 12 HOURS HOURS X 1 DAY FOR COLD SORES 12 tablet 3  Menaquinone-7 (VITAMIN K2 PO) Take 1 tablet by mouth daily      polyethylene glycol (GLYCOLAX) powder Take 17 g by mouth daily      thyroid (ARMOUR THYROID) 15 MG tablet Take 1 tablet by mouth daily      RESTASIS 0.05 % ophthalmic emulsion Apply 1 drop to eye 2 times daily  1    Cholecalciferol (VITAMIN D3 PO) Take by mouth       No current facility-administered medications on file prior to visit. Objective:         Physical Exam  Vitals and nursing note reviewed. Constitutional:       General: She is not in acute distress. Appearance: She is well-developed. HENT:      Head: Normocephalic and atraumatic. Right Ear: Hearing, tympanic membrane and external ear normal.      Left Ear: Hearing, tympanic membrane and external ear normal.      Nose: Septal deviation present. No nasal deformity, laceration, mucosal edema or rhinorrhea. Right Sinus: No maxillary sinus tenderness or frontal sinus tenderness. Left Sinus: No maxillary sinus tenderness or frontal sinus tenderness. Mouth/Throat:      Mouth: Mucous membranes are moist.      Pharynx: No oropharyngeal exudate or posterior oropharyngeal erythema. Eyes:      Conjunctiva/sclera: Conjunctivae normal.   Neck:      Thyroid: No thyromegaly. Trachea: No tracheal deviation. Cardiovascular:      Rate and Rhythm: Normal rate and regular rhythm. Heart sounds: Normal heart sounds, S1 normal and S2 normal. No friction rub. No gallop. Pulmonary:      Effort: No respiratory distress. Breath sounds: No wheezing or rales. Lymphadenopathy:      Head:      Right side of head: No submental, submandibular or posterior auricular adenopathy. Left side of head: No submental, submandibular or posterior auricular adenopathy. Cervical:      Right cervical: No superficial, deep or posterior cervical adenopathy. Left cervical: No superficial, deep or posterior cervical adenopathy.    Skin:     General: Skin is warm and dry. Psychiatric:         Behavior: Behavior normal.       Vitals:    04/06/22 1000   BP: 110/60   Site: Left Upper Arm   Position: Sitting   Cuff Size: Medium Adult   Pulse: 84   SpO2: 98%   Weight: 119 lb 12.8 oz (54.3 kg)   Height: 5' 3.25\" (1.607 m)     Body mass index is 21.05 kg/m². Wt Readings from Last 3 Encounters:   04/06/22 119 lb 12.8 oz (54.3 kg)   12/30/21 116 lb (52.6 kg)   12/13/21 116 lb (52.6 kg)     BP Readings from Last 3 Encounters:   04/06/22 110/60   08/09/21 110/70   06/29/21 (!) 84/50          No results found for this visit on 04/06/22. The 10-year ASCVD risk score (Hannah Licona, et al., 2013) is: 2.4%    Values used to calculate the score:      Age: 57 years      Sex: Female      Is Non- : No      Diabetic: No      Tobacco smoker: No      Systolic Blood Pressure: 970 mmHg      Is BP treated: No      HDL Cholesterol: 78 mg/dL      Total Cholesterol: 191 mg/dL  Lab Review   No visits with results within 2 Month(s) from this visit. Latest known visit with results is:   Hospital Outpatient Visit on 09/08/2021   Component Date Value    Source 09/08/2021 VIRAL SWAB     SARS-CoV-2 09/08/2021 NOT DETECTED            Assessment:       Diagnosis Orders   1. Environmental allergies  fluticasone (FLONASE) 50 MCG/ACT nasal spray   2. Sacro ilial pain  [facility]           Plan: We will send prescription for Flonase. Gave her information on as is azelastine. She can let me know if she would like to get a prescription for this    We will send to physical therapy for her sacral iliac pain          Return if symptoms worsen or fail to improve.

## 2022-04-09 LAB — FERRITIN: 73 NG/ML (ref 9–150)

## 2022-04-12 LAB
AVERAGE GLUCOSE: NORMAL
HBA1C MFR BLD: 5.8 %
T3 FREE: 2.9
T4 FREE: 1.31
TSH SERPL DL<=0.05 MIU/L-ACNC: 2.23 UIU/ML
VITAMIN D 25-HYDROXY: 72.5
VITAMIN D2, 25 HYDROXY: NORMAL
VITAMIN D3,25 HYDROXY: NORMAL

## 2022-04-25 ENCOUNTER — HOSPITAL ENCOUNTER (OUTPATIENT)
Dept: PHYSICAL THERAPY | Age: 62
Discharge: HOME | End: 2022-04-25

## 2022-04-25 NOTE — FLOWSHEET NOTE
Outpatient Physical Therapy  [place]           [x] Phone: [phone]   Fax: [phone]  [facility]           [] Phone: [phone]   Fax: [phone]        Physical Therapy Daily Treatment Note  Date:  2022    Patient Name:  Teodoro Regan    :  1960  MRN: [id number]      Pt cancelled PT eval on 22, not feeling well.               Electronically signed by:  Hair De Leon PT, DPT, OCS  2022, 7:44 AM    2022 7:44 AM

## 2022-04-27 ENCOUNTER — OFFICE VISIT (OUTPATIENT)
Dept: SURGERY | Age: 62
End: 2022-04-27
Payer: COMMERCIAL

## 2022-04-27 ENCOUNTER — OFFICE VISIT (OUTPATIENT)
Dept: FAMILY MEDICINE CLINIC | Age: 62
End: 2022-04-27
Payer: COMMERCIAL

## 2022-04-27 VITALS — DIASTOLIC BLOOD PRESSURE: 80 MMHG | OXYGEN SATURATION: 100 % | SYSTOLIC BLOOD PRESSURE: 140 MMHG | HEART RATE: 76 BPM

## 2022-04-27 VITALS
DIASTOLIC BLOOD PRESSURE: 70 MMHG | WEIGHT: 121 LBS | TEMPERATURE: 98.2 F | SYSTOLIC BLOOD PRESSURE: 116 MMHG | HEART RATE: 86 BPM | HEIGHT: 63 IN | BODY MASS INDEX: 21.44 KG/M2

## 2022-04-27 DIAGNOSIS — S60.551A INFECTED FOREIGN BODY IN RIGHT HAND, INITIAL ENCOUNTER: Primary | ICD-10-CM

## 2022-04-27 DIAGNOSIS — S60.551A SPLINTER OF RIGHT HAND: Primary | ICD-10-CM

## 2022-04-27 DIAGNOSIS — L08.9 INFECTED FOREIGN BODY IN RIGHT HAND, INITIAL ENCOUNTER: Primary | ICD-10-CM

## 2022-04-27 PROCEDURE — G8427 DOCREV CUR MEDS BY ELIG CLIN: HCPCS | Performed by: FAMILY MEDICINE

## 2022-04-27 PROCEDURE — 10120 INC&RMVL FB SUBQ TISS SMPL: CPT | Performed by: SURGERY

## 2022-04-27 PROCEDURE — 1036F TOBACCO NON-USER: CPT | Performed by: FAMILY MEDICINE

## 2022-04-27 PROCEDURE — 3017F COLORECTAL CA SCREEN DOC REV: CPT | Performed by: FAMILY MEDICINE

## 2022-04-27 PROCEDURE — 99213 OFFICE O/P EST LOW 20 MIN: CPT | Performed by: FAMILY MEDICINE

## 2022-04-27 PROCEDURE — G8420 CALC BMI NORM PARAMETERS: HCPCS | Performed by: FAMILY MEDICINE

## 2022-04-27 RX ORDER — CEPHALEXIN 500 MG/1
500 CAPSULE ORAL 3 TIMES DAILY
Qty: 21 CAPSULE | Refills: 0 | Status: SHIPPED | OUTPATIENT
Start: 2022-04-27 | End: 2022-06-22

## 2022-04-27 ASSESSMENT — ENCOUNTER SYMPTOMS
STRIDOR: 0
BLOOD IN STOOL: 0
SHORTNESS OF BREATH: 0
BACK PAIN: 0
ABDOMINAL DISTENTION: 0
COLOR CHANGE: 0
COUGH: 0
CHOKING: 0
SORE THROAT: 0
ABDOMINAL PAIN: 0
TROUBLE SWALLOWING: 0
VOMITING: 0
NAUSEA: 0

## 2022-04-27 ASSESSMENT — PATIENT HEALTH QUESTIONNAIRE - PHQ9
SUM OF ALL RESPONSES TO PHQ QUESTIONS 1-9: 0
2. FEELING DOWN, DEPRESSED OR HOPELESS: 0
SUM OF ALL RESPONSES TO PHQ QUESTIONS 1-9: 0
SUM OF ALL RESPONSES TO PHQ QUESTIONS 1-9: 0
1. LITTLE INTEREST OR PLEASURE IN DOING THINGS: 0
SUM OF ALL RESPONSES TO PHQ9 QUESTIONS 1 & 2: 0
SUM OF ALL RESPONSES TO PHQ QUESTIONS 1-9: 0

## 2022-04-27 NOTE — PATIENT INSTRUCTIONS
Soak in warm water twice daily and apply clean gauze dressing. Follow-up with me in 1 week for wound check. Complete antibiotics from your PCP.

## 2022-04-27 NOTE — PROGRESS NOTES
SUBJECTIVE:  HPI:     Splinter in palm of right hand since Saturday. Has small hematoma/abscess area. Was able to remove only part of splinter. Past Surgical History:   Procedure Laterality Date    COLONOSCOPY  2008    COLONOSCOPY  07/27/2018    repeat in 10 years    ENDOSCOPY, COLON, DIAGNOSTIC      PATELLA SURGERY Left 2004    SHOULDER SURGERY Right 2015    Fester    TONSILLECTOMY      WISDOM TOOTH EXTRACTION       Past Medical History:   Diagnosis Date    ADD (attention deficit disorder)     Celiac disease     Nausea      Family History   Problem Relation Age of Onset    Dementia Mother 79        Picks Disease    Celiac Disease Mother     Osteoporosis Mother     Heart Attack Father 68    Cancer Father         Lymphoma    Thyroid Disease Sister         Graves    Osteoporosis Sister     Thyroid Disease Sister     Celiac Disease Sister     Rheum Arthritis Sister      Social History     Socioeconomic History    Marital status:      Spouse name: Not on file    Number of children: Not on file    Years of education: Not on file    Highest education level: Not on file   Occupational History    Occupation: [employer]     Comment: [employer]   Tobacco Use    Smoking status: Never Smoker    Smokeless tobacco: Never Used   Substance and Sexual Activity    Alcohol use: Yes     Alcohol/week: 0.0 standard drinks     Comment: occas    Drug use: No    Sexual activity: Not Currently   Other Topics Concern    Not on file   Social History Narrative    Not on file     Social Determinants of Health     Financial Resource Strain:     Difficulty of Paying Living Expenses: Not on file   Food Insecurity:     Worried About Running Out of Food in the Last Year: Not on file    Sixto of Food in the Last Year: Not on file   Transportation Needs:     Lack of Transportation (Medical): Not on file    Lack of Transportation (Non-Medical):  Not on file   Physical Activity:     Days of Exercise per Week: Not on file    Minutes of Exercise per Session: Not on file   Stress:     Feeling of Stress : Not on file   Social Connections:     Frequency of Communication with Friends and Family: Not on file    Frequency of Social Gatherings with Friends and Family: Not on file    Attends Catholic Services: Not on file    Active Member of Clubs or Organizations: Not on file    Attends Club or Organization Meetings: Not on file    Marital Status: Not on file   Intimate Partner Violence:     Fear of Current or Ex-Partner: Not on file    Emotionally Abused: Not on file    Physically Abused: Not on file    Sexually Abused: Not on file   Housing Stability:     Unable to Pay for Housing in the Last Year: Not on file    Number of [place] in the Last Year: Not on file    Unstable Housing in the Last Year: Not on file       Current Outpatient Medications   Medication Sig Dispense Refill    cephALEXin (KEFLEX) 500 MG capsule Take 1 capsule by mouth 3 times daily 21 capsule 0    fluticasone (FLONASE) 50 MCG/ACT nasal spray 2 sprays by Each Nostril route daily 3 g 3    atomoxetine (STRATTERA) 60 MG capsule Take 1 capsule by mouth daily 90 capsule 3    valACYclovir (VALTREX) 1 g tablet 2 TABS BY MOUTH EVERY 12 HOURS HOURS X 1 DAY FOR COLD SORES 12 tablet 3    Menaquinone-7 (VITAMIN K2 PO) Take 1 tablet by mouth daily      polyethylene glycol (GLYCOLAX) powder Take 17 g by mouth daily      thyroid (ARMOUR THYROID) 15 MG tablet Take 1 tablet by mouth daily      RESTASIS 0.05 % ophthalmic emulsion Apply 1 drop to eye 2 times daily  1    Cholecalciferol (VITAMIN D3 PO) Take by mouth       No current facility-administered medications for this visit. Allergies   Allergen Reactions    Gluten Meal     Seasonal        Review of Systems:         Review of Systems   Constitutional: Negative for chills, fatigue, fever and unexpected weight change. HENT: Negative for sore throat and trouble swallowing. Respiratory: Negative for cough, choking, shortness of breath and stridor. Cardiovascular: Negative for chest pain, palpitations and leg swelling. Gastrointestinal: Negative for abdominal distention, abdominal pain, blood in stool, nausea and vomiting. Musculoskeletal: Negative for back pain, gait problem and joint swelling. Skin: Negative for color change, rash and wound. Splinter right hand   Allergic/Immunologic: Negative for immunocompromised state. Neurological: Negative for dizziness, speech difficulty, weakness and light-headedness. Hematological: Negative for adenopathy. Does not bruise/bleed easily. Psychiatric/Behavioral: Negative for agitation and confusion. The patient is not nervous/anxious. OBJECTIVE:  Physical Exam:    BP (!) 140/80   Pulse 76   LMP 11/09/2011 (Exact Date)   SpO2 100%      Physical Exam  General: No acute distress  Neck: No JVD, supple  Lungs: Chest rise equal bilaterally  Cardiac: Appears well perfused   Abdomen: Soft, nontender, nondistended, no rebound or guarding  Extremities, no edema, cyanosis, or clubbing  Skin: No rashes or breakdown  Neurologic: cranial nerves II - XII grossly intact  Right hand-palm with splinter with small abscess approximately 1.5 cm in diameter. ASSESSMENT:  1. Splinter of right hand      With small abscess    PLAN:    Removed in office today. Continue antibiotics from PCP. Soak in warm water twice daily and placed clean gauze dressing. Follow-up in 1 week for wound check. No orders of the defined types were placed in this encounter. No orders of the defined types were placed in this encounter. Follow Up:  No follow-ups on file.       Kunal Mcmanus DO

## 2022-04-27 NOTE — PATIENT INSTRUCTIONS
PLEASE BRING YOUR MEDICATIONS TO ALL APPOINTMENTS      Please check MY CHART for test results. If you have forgotten your password, please call [phone]. The diagnoses and medications listed in this after visit summary may not be up to date. Please check MY CHART in 28-48 hours for possible corrections. Late cancellation policy: So that we can better accommodate people who are sick, please give our office 24 hour notice for an appointment cancellation. Missed appointments: Your care is very important to us. It is important that you keep your scheduled appointments. Multiple missed appointments will lead to a dismissal from the office. Patients arriving late will be worked into the schedule as time permits, with patients arriving on time taken as scheduled. Late arriving patients are more than welcome to wait or reschedule their appointments. Please allow 5-7 business days for paperwork to be processed. HERE ARE SOME LIFE CHANGING TIPS      1. Take your blood pressure medications at bedtime to reduce your chance of heart attack or stroke  2. Fever in kids:  Give both Tylenol and Ibuprofen at the same time rather than staggering them   3. Follow these tips to reduce childhood obesity: Reduce unnecessary exposure to antibiotics, consume whole milk instead of skim milk, watch public TV instead of regular TV (less exposure to junk food commercials), and reduce traumatic experiences. 4. 1 egg per day is good for your heart  5. Alternate day fasting does promote weight loss. Skipping breakfast increases your risk of obesity  6. Artificially sweetened drinks increase all cause mortality (strokes, body mass index, cardiovascular disease)  7. Kale consumption can reduce onset of dementia  8. Walking at least 8000 steps per day and resistance exercise 2-3 x per week are good for your heart  9. Brushing teeth 3 times per day can decrease chance of getting diabetes  10.  Antibiotic use is associated with a lifetime increased risk of breast cancer and heart disease.

## 2022-04-27 NOTE — PROGRESS NOTES
Patient ID: Teodoro Jass 1960    Chief Complaint   Patient presents with    Foreign Body in Skin     splinter in right hand Saturday. Pulled out most of the splinter. Pain when moving hand         HPI     Foreign body in hand: Ongoing since 5 days. Got most of the splinter out but believes there is still some broken pieces left.   Hand is now red and tender and swollen    Mild cold symtoms this week: covid negative test .        Review of Systems    Patient Active Problem List   Diagnosis    Gluten-sensitive enteropathy    Allergic rhinitis    HSV-1 (herpes simplex virus 1) infection    Gastroesophageal reflux disease without esophagitis    Slow transit constipation    ADD (attention deficit disorder)    Family history of osteoporosis    Kyphosis    Impingement syndrome of right shoulder    Right rotator cuff tendinitis    Impaired glucose tolerance    Osteoarthritis of lumbar spine       Past Surgical History:   Procedure Laterality Date    COLONOSCOPY  2008    COLONOSCOPY  07/27/2018    repeat in 10 years    ENDOSCOPY, COLON, DIAGNOSTIC      PATELLA SURGERY Left 2004    SHOULDER SURGERY Right 2015    Fester    TONSILLECTOMY      WISDOM TOOTH EXTRACTION         Family History   Problem Relation Age of Onset    Dementia Mother 79        Picks Disease    Celiac Disease Mother     Osteoporosis Mother     Heart Attack Father 68    Cancer Father         Lymphoma    Thyroid Disease Sister         Graves    Osteoporosis Sister     Thyroid Disease Sister     Celiac Disease Sister     Rheum Arthritis Sister        Current Outpatient Medications on File Prior to Visit   Medication Sig Dispense Refill    fluticasone (FLONASE) 50 MCG/ACT nasal spray 2 sprays by Each Nostril route daily 3 g 3    atomoxetine (STRATTERA) 60 MG capsule Take 1 capsule by mouth daily 90 capsule 3    valACYclovir (VALTREX) 1 g tablet 2 TABS BY MOUTH EVERY 12 HOURS HOURS X 1 DAY FOR COLD SORES 12 tablet 3    Menaquinone-7 (VITAMIN K2 PO) Take 1 tablet by mouth daily      polyethylene glycol (GLYCOLAX) powder Take 17 g by mouth daily      thyroid (ARMOUR THYROID) 15 MG tablet Take 1 tablet by mouth daily      RESTASIS 0.05 % ophthalmic emulsion Apply 1 drop to eye 2 times daily  1    Cholecalciferol (VITAMIN D3 PO) Take by mouth       No current facility-administered medications on file prior to visit. Objective:           Physical Exam  Vitals:    04/27/22 1410   BP: 116/70   Site: Left Upper Arm   Position: Sitting   Cuff Size: Medium Adult   Pulse: 86   Temp: 98.2 °F (36.8 °C)   TempSrc: Oral   Weight: 121 lb (54.9 kg)   Height: 5' 3.25\" (1.607 m)     Body mass index is 21.27 kg/m². Wt Readings from Last 3 Encounters:   04/27/22 121 lb (54.9 kg)   04/06/22 119 lb 12.8 oz (54.3 kg)   12/30/21 116 lb (52.6 kg)     BP Readings from Last 3 Encounters:   04/27/22 116/70   04/06/22 110/60   08/09/21 110/70          No results found for this visit on 04/27/22. Assessment:       Diagnosis Orders   1. Infected foreign body in right hand, initial encounter  cephALEXin (KEFLEX) 500 MG capsule    [address], DO, General Surgery, [place]           Plan:      See orders    No follow-ups on file.

## 2022-04-27 NOTE — PROGRESS NOTES
Procedure note:    Pre-Op diagnosis: Splinter right hand with abscess  Postop diagnosis: Same  Procedure: Removal of foreign body right hand with I&D of abscess  Surgeon: Dr. Chiqui Rowley  Indication: Lydia Sovereign right hand with abscess  Procedure: Area was prepped and local anesthetic infiltrated around area. A 10 blade scalpel was used to open the wound. Pickups were used to grasp the splinter and remove it. There was purulence and fluctuance more proximally. The incision was extended to approximately 0.5 cm over the abscess and purulence was expressed. The abscess cavity was irrigated and a sterile dressing was placed. Patient tolerated procedure without complication.

## 2022-05-04 ENCOUNTER — OFFICE VISIT (OUTPATIENT)
Dept: SURGERY | Age: 62
End: 2022-05-04

## 2022-05-04 DIAGNOSIS — T14.8XXA SPLINTER: Primary | ICD-10-CM

## 2022-05-04 PROCEDURE — 99024 POSTOP FOLLOW-UP VISIT: CPT | Performed by: SURGERY

## 2022-05-04 NOTE — PROGRESS NOTES
Patient here for wound check. Had right hand splinter with abscess on palm of hand. Wound healing appropriately. There is no drainage.  but improving. PE  Wound healing appropriately. No erythema. No drainage. No fluctuance or induration. ROM/muscle strength all digits intact. A/P:  Status post I&D/foreign body removal right hand. Healing appropriately. Follow-up with me as needed.

## 2022-05-09 ENCOUNTER — HOSPITAL ENCOUNTER (OUTPATIENT)
Dept: PHYSICAL THERAPY | Age: 62
Setting detail: THERAPIES SERIES
Discharge: HOME | End: 2022-05-09
Payer: COMMERCIAL

## 2022-05-09 PROCEDURE — 97110 THERAPEUTIC EXERCISES: CPT

## 2022-05-09 PROCEDURE — 97161 PT EVAL LOW COMPLEX 20 MIN: CPT

## 2022-05-09 ASSESSMENT — PAIN SCALES - GENERAL: PAINLEVEL_OUTOF10: 3

## 2022-05-09 ASSESSMENT — PAIN DESCRIPTION - DESCRIPTORS: DESCRIPTORS: ACHING;NUMBNESS;TINGLING

## 2022-05-09 ASSESSMENT — PAIN - FUNCTIONAL ASSESSMENT: PAIN_FUNCTIONAL_ASSESSMENT: PREVENTS OR INTERFERES SOME ACTIVE ACTIVITIES AND ADLS

## 2022-05-09 ASSESSMENT — PAIN DESCRIPTION - FREQUENCY: FREQUENCY: INTERMITTENT

## 2022-05-09 ASSESSMENT — PAIN DESCRIPTION - LOCATION: LOCATION: BUTTOCKS;HIP;BACK

## 2022-05-09 ASSESSMENT — PAIN DESCRIPTION - PAIN TYPE: TYPE: CHRONIC PAIN

## 2022-05-09 ASSESSMENT — PAIN DESCRIPTION - ONSET: ONSET: AWAKENED FROM SLEEP

## 2022-05-09 NOTE — PROGRESS NOTES
Physical Therapy: Initial Evaluation    Patient: King Ricketts (6 y.o. female)   Examination Date:   Plan of Care Certification Period: 2022 to        :  1960 ;    Confirmed: Yes MRN: [id number]  CSN: [id number]   Insurance: Payor: Mary Res / Plan: [facility] / Product Type: *No Product type* /   Insurance ID: [id number] - (Commercial) Secondary Insurance (if applicable):    Referring Physician: MD Dr. Apple Simental   PCP: Flora Valdivia MD Visits to Date/Visits Approved:   /      No Show/Cancelled Appts:   /       Medical Diagnosis: Sacrococcygeal disorders, not elsewhere classified [M53.3] sacroiliac pain  Treatment Diagnosis: Decreased tolerance to activity limited by SI pain     PERTINENT MEDICAL HISTORY   Patient Assessed for Rehabilitation Services: Yes  Self reported health status[de-identified] Good    Medical History: Chart Reviewed: Yes   Past Medical History:   Diagnosis Date    ADD (attention deficit disorder)     Celiac disease     Nausea      Surgical History:   Past Surgical History:   Procedure Laterality Date    COLONOSCOPY      COLONOSCOPY  2018    repeat in 10 years    ENDOSCOPY, COLON, DIAGNOSTIC      PATELLA SURGERY Left 2004    SHOULDER SURGERY Right     Fester    TONSILLECTOMY      WISDOM TOOTH EXTRACTION         Medications:   Current Outpatient Medications:     cephALEXin (KEFLEX) 500 MG capsule, Take 1 capsule by mouth 3 times daily, Disp: 21 capsule, Rfl: 0    fluticasone (FLONASE) 50 MCG/ACT nasal spray, 2 sprays by Each Nostril route daily, Disp: 3 g, Rfl: 3    atomoxetine (STRATTERA) 60 MG capsule, Take 1 capsule by mouth daily, Disp: 90 capsule, Rfl: 3    valACYclovir (VALTREX) 1 g tablet, 2 TABS BY MOUTH EVERY 12 HOURS HOURS X 1 DAY FOR COLD SORES, Disp: 12 tablet, Rfl: 3    Menaquinone-7 (VITAMIN K2 PO), Take 1 tablet by mouth daily, Disp: , Rfl:     polyethylene glycol (GLYCOLAX) powder, Take 17 g by mouth daily, Disp: , Rfl:     thyroid (ARMOUR THYROID) 15 MG tablet, Take 1 tablet by mouth daily, Disp: , Rfl:     RESTASIS 0.05 % ophthalmic emulsion, Apply 1 drop to eye 2 times daily, Disp: , Rfl: 1    Cholecalciferol (VITAMIN D3 PO), Take by mouth, Disp: , Rfl:   Allergies: Gluten meal and Seasonal      SUBJECTIVE EXAMINATION     History obtained from[de-identified] Patient,Chart Review,      Family/Caregiver Present: No    Subjective History:    Subjective: Pt reports she had the Si pain for severla years and has bene seeing a chiropractor. 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 She wears a small lif tin her R shoe. She hikes and bicycles and it bothers her a lot at these times. Has numbness on the outer aspect of her thigh. Bothers her the most when she is walking and going up steps.   Additional Pertinent Hx (if applicable):     Previous treatments prior to current episode?: Chiropractor  Any changes to allergies, medications, or have you had any medical procedures/ER visits since your last visit?: No      Learning/Language: Learning  Does the patient/guardian have any barriers to learning?: No barriers  Will there be a co-learner?: No  What is the preferred language of the patient/guardian?: English  Is an  required?: No  How does the patient/guardian prefer to learn new concepts?: Listening,Demonstration,Reading     Pain Screening   Pain Screening  Patient Currently in Pain: Yes  Pain Assessment: 0-10  Pain Level: 3  Best Pain Level: 0  Worst Pain Level: 6  Patient's Stated Pain Goal: 0 - No pain  Pain Type: Chronic pain  Pain Location: Buttocks,Hip,Back  Pain Descriptors: Aching,Numbness,Tingling  Pain Frequency: Intermittent  Pain Onset: Awakened from sleep  Functional Pain Assessment: Prevents or interferes some active activities and ADLs  Aggravating factors: Walking,Sitting,Standing    Functional Status    Dominant Hand: : Right    Occupation/Interests:  OT at school    Prior Level of Function:  Independent     Current Level of Function:  Independent     OBJECTIVE EXAMINATION   Restrictions:             Review of Systems:  Vision: Within Functional Limits  Hearing: Within functional limits  Follows Commands: Within Functional Limits    VBI Screening / Lumbar Screening:   Bowel/bladder disturbances: No  Saddle anesthesia: No  Unexplained weight loss: No  Severe motor weakness: No  Stumbling or giving way while walking: No  Unrelenting pain at night: No    Observations:  General Observations  Description: Noted hgiher ASIS on L side and medial malleoli    Palpation:   Lumbar Spine Palpation: Noted increased tenderness along bilat PSIS (R >L), noted L medial malleoli higher than L side    Left AROM  Right AROM       No restrictions     No restrictions        Left PROM  Right PROM       No restrictions     No restrictions        Left Strength  Right Strength      Strength Other  Other: Bridge: good form except no core brace noted, able to complete with cueing, SLR: significant weakness on R side with resistance  Other: Bridge: good form except no core brace noted, able to complete with cueing, SLR: significant weakness on R side with resistance  Strength LLE  Strength LLE: WFL  Comment: except 4-/5 hip flexion, 4/5 hip abd Strength Other  Other: Bridge: good form except no core brace noted, able to complete with cueing, SLR: significant weakness on R side with resistance  Other: Bridge: good form except no core brace noted, able to complete with cueing, SLR: significant weakness on R side with resistance  Strength RLE  Strength RLE: WFL  Comment: except 4-/5 hip flexion, 4/5 knee ext, 4/5 DF, 4/5 hip abd     Thoracic Assessment     Able to clear ASIS bilaterally without increased pain reported        Lumbar Assessment    No red flags reported  Noted increased tenderness along bilat PSIS (R >L), noted L medial malleoli higher than L side       Muscle Length/Flexibility: Flexibility: min limitations in hamstring mobility    Joint Mobility (if applicable):   No limitations noted     ASSESSMENT     Impression: Assessment: Pt is 57year old female with chronic SI pain. Pt c/o of intermittent and ongoing pain along bilat SI joints, more bothersome with squatting, lifting, and prolonged positioning. She currently sleeps on the couch due to increased movement and pain in bed. She has been seeing a chiropractor for several years. Pt demo deficits this date that include functional pelvic mis-alignment, weakness of BLE, tenderness along bilat SI joints, impaired sensation along R lateral thigh, reduced core/pelvic control . Pt will benefit with PT services with pelvic/sacral mobilizations if appropriate, glute,hamstring, hip flexor strengthening, TA activation/core bracing, core stabilization, self pelvic/sacral alignment techniques, education on positioning to return to PLOF. Pt prior to onset of current condition had min pain with able to complete full ADLs and work activities. Patient received education on their current pathology and how their condition effects them with their functional activities. Patient understood discussion and questions were answered. Patient understands their activity limitations and understands rational for treatment progression. Body Structures, Functions, Activity Limitations Requiring Skilled Therapeutic Intervention: Decreased functional mobility ,Decreased ADL status,Increased pain,Decreased sensation,Decreased ROM,Decreased posture,Decreased tolerance to work activity,Decreased strength    Statement of Medical Necessity: Physical Therapy is both indicated and medically necessary as outlined in the POC to increase the likelihood of meeting the functionally related goals stated below.      Patient's Activity Tolerance: Patient tolerated evaluation without incident      Patient's rehabilitation potential/prognosis is considered to be: Good    Factors which may impact rehabilitation potential include: None  Measures taken to address barrier(s): N/A     GOALS   Patient Goal(s): improve pain and strength  Short Term Goals Completed by Pt demo I w/ HEP and symptom management Goal Status     New   Pt demo Oswestry score <8/50 to improve tolerance to ADL's and work duties New   Pt demo 5/5 BLE strength in all directions to improve tolerance to work duties New   Pt demo ability to squat x10 with pain reported <1/10 and proper core brace New   Pt reports ability to sleep in bed with pain <2/10 to improve tolerance to sleeping position New     TREATMENT PLAN       Requires PT Follow-Up: Yes    Pt. actively involved in establishing Plan of Care and Goals: Yes  Patient/ Caregiver education and instruction: Gus Rocha of Care,Evaluative findings,Posture,Home Exercise Program,Anatomy of condition,Disease Specific Education discussed biomechanics of pelic/sacral motion and contributions from surrounding muscles           Treatment may include any combination of the following: Aislinn Ohs to work related activity,Positioning,Neuromuscular re-education,Manual Therapy - Soft Tissue Mobilization,Home exercise program,Therapeutic activities,Manual Therapy - Joint Manipulation     Frequency / Duration:  Patient to be seen 1x for 8 weeks weeks      Eval Complexity:    Decision Making: Low Complexity        Therapist Signature: Pleas Bence, PT    Date: 0/3/0750     I certify that the above Therapy Services are being furnished while the patient is under my care. I agree with the treatment plan and certify that this therapy is necessary. Physician's Signature:  ___________________________   Date:_______                                                                   Myrtle Rivas MD        Physician Comments: _______________________________________________    Please sign and return to [address] [place] [address].   Please fax to the location listed below.  Aleksandar Nickerson for this referral!    [address] [facility] [address], [address] [id number]  Dept: [phone]  Loc: [phone]

## 2022-05-09 NOTE — PLAN OF CARE
Outpatient Physical Therapy           [place]           [] Phone: [phone]   Fax: [phone]  [place]           [] Phone: [phone]   Fax: [phone]     To: MD Dr. Valerio Daly   From: Dylan Childress, PT, PT     Patient: Angeli Brown       : 1960  Diagnosis: Sacrococcygeal disorders, not elsewhere classified [M53.3] Diagnosis: sacroiliac pain  Treatment Diagnosis: Decreased tolerance to activity limited by SI pain  Date: 2022    Physical Therapy Certification/Re-Certification Form  Dear Dr. Valerio Schmitt,  The following patient has been evaluated for physical therapy services and for therapy to continue, insurance requires physician review of the treatment plan initially and every 90 days. Please review the attached evaluation and/or summary of the patient's plan of care, and verify that you agree therapy should continue by signing the attached document and sending it back to our office. Assessment:    Assessment: Pt is 57year old female with chronic SI pain. Pt c/o of intermittent and ongoing pain along bilat SI joints, more bothersome with squatting, lifting, and prolonged positioning. She currently sleeps on the couch due to increased movement and pain in bed. She has been seeing a chiropractor for several years. Pt demo deficits this date that include functional pelvic mis-alignment, weakness of BLE, tenderness along bilat SI joints, impaired sensation along R lateral thigh, reduced core/pelvic control . Pt will benefit with PT services with pelvic/sacral mobilizations if appropriate, glute,hamstring, hip flexor strengthening, TA activation/core bracing, core stabilization, self pelvic/sacral alignment techniques, education on positioning to return to PLOF. Pt prior to onset of current condition had min pain with able to complete full ADLs and work activities.  Patient received education on their current pathology and how their condition effects them with their functional activities. Patient understood discussion and questions were answered. Patient understands their activity limitations and understands rational for treatment progression. Plan of Care/Treatment to date:  [x] Therapeutic Exercise  [] Modalities:  [x] Therapeutic Activity     [] Ultrasound  [] Electrical Stimulation  [] Gait Training      [] Cervical Traction [] Lumbar Traction  [x] Neuromuscular Re-education    [x] Cold/hotpack [] Iontophoresis   [x] Instruction in HEP      [] Vasopneumatic    [] Dry Needling  [x] Manual Therapy               [] Aquatic Therapy       Other:          Frequency/Duration:  # Days per week: [x] 1 day # Weeks: [] 1 week [x] 5 weeks     [x] 2 days   [] 2 weeks [] 6 weeks     [] 3 days   [] 3 weeks [] 7 weeks     [] 4 days   [] 4 weeks [] 8 weeks         [] 9 weeks [] 10 weeks         [] 11 weeks [] 12 weeks    Rehab Potential/Progress: [] Excellent [x] Good [] Fair  [] Poor     Goals:    Patient goals: improve pain and strength  Short term goals  Time Frame for Short term goals: Pt demo I w/ HEP and symptom management     Pt demo Oswestry score <8/50 to improve tolerance to ADL's and work duties  Pt demo 5/5 BLE strength in all directions to improve tolerance to work duties  Pt demo ability to squat x10 with pain reported <1/10 and proper core brace  Pt reports ability to sleep in bed with pain <2/10 to improve tolerance to sleeping position      Electronically signed by:  Brandon Reynolds PT, DPT, [facility] 5/9/2022, 5:37 PM        If you have any questions or concerns, please don't hesitate to call.   Thank you for your referral.      Physician Signature:________________________________Date:_________ TIME: _____  By signing above, therapists plan is approved by physician

## 2022-05-09 NOTE — FLOWSHEET NOTE
Outpatient Physical Therapy  [place]           [x] Phone: [phone]   Fax: [phone]  [facility]           [] Phone: [phone]   Fax: [phone]        Physical Therapy Daily Treatment Note  Date:  2022    Patient Name:  Sherry Lares    :  1960  MRN: [id number]  Restrictions/Precautions: NONE  Diagnosis:   Sacrococcygeal disorders, not elsewhere classified [M53.3] Diagnosis: sacroiliac pain  Date of Injury/Surgery: --  Treatment Diagnosis:  Decreased tolerance to activity limited by SI pain  Insurance/Certification information: [facility]  Referring Physician:  MD Dr. Jing Eason   PCP: Spencer Aguilera MD  Next Doctor Visit:  --  Plan of care signed (Y/N):  N, sent 22   Outcome Measure: Oswestry: 8/50  Visit# / total visits:   1/10  Pain level: 0/10   Goals:     Patient goals: improve pain and strength  Short term goals  Time Frame for Short term goals: Pt demo I w/ HEP and symptom management     Pt demo Oswestry score <8/50 to improve tolerance to ADL's and work duties  Pt demo 5/5 BLE strength in all directions to improve tolerance to work duties  [employer] ability to squat x10 with pain reported <1/10 and proper core brace  Pt reports ability to sleep in bed with pain <2/10 to improve tolerance to sleeping position    Summary of Evaluation:   Pt is 57year old female with chronic SI pain. Pt c/o of intermittent and ongoing pain along bilat SI joints, more bothersome with squatting, lifting, and prolonged positioning. She currently sleeps on the couch due to increased movement and pain in bed. She has been seeing a chiropractor for several years. Pt demo deficits this date that include functional pelvic mis-alignment, weakness of BLE, tenderness along bilat SI joints, impaired sensation along R lateral thigh, reduced core/pelvic control .  Pt will benefit with PT services with pelvic/sacral mobilizations if appropriate, glute,hamstring, hip flexor strengthening, TA activation/core bracing, core stabilization, self pelvic/sacral alignment techniques, education on positioning to return to PLOF. Pt prior to onset of current condition had min pain with able to complete full ADLs and work activities. Patient received education on their current pathology and how their condition effects them with their functional activities. Patient understood discussion and questions were answered. Patient understands their activity limitations and understands rational for treatment progression. Subjective:  See katharina         Any changes in Ambulatory Summary Sheet? None        Objective:  See eval   COVID screening questions were asked and patient attested that there had been no contact or symptoms        Exercises: (No more than 4 columns)   Exercise/Equipment 5/9/22 #1 Date Date           WARM UP      Nu Step               TABLE      *SLR      *PPT      *Bridge w/ adductor squeeze      Prone Extension      Seated Hamstring Curl Machine         STANDING      Shuttle Press      SB Squat w/ core brace                                         PROPRIOCEPTION                                    MODALITIES                      Other Therapeutic Activities/Education: Patient received education on their current pathology and how their condition effects them with their functional activities. Patient understood discussion and questions were answered. Patient understands their activity limitations and understands rational for treatment progression. Home Exercise Program:  HO issued, reviewed and discussed with patient. Pt agreed to comply. Manual Treatments:  --      Modalities:  --      Communication with other providers:  malissaal sent 5/9/22      Assessment:  (Response towards treatment session) (Pain Rating)     Pt is 57year old female with chronic SI pain.  Pt c/o of intermittent and ongoing pain along bilat SI joints, more bothersome with squatting, lifting, and prolonged positioning. She currently sleeps on the couch due to increased movement and pain in bed. She has been seeing a chiropractor for several years. Pt demo deficits this date that include functional pelvic mis-alignment, weakness of BLE, tenderness along bilat SI joints, impaired sensation along R lateral thigh, reduced core/pelvic control . Pt will benefit with PT services with pelvic/sacral mobilizations if appropriate, glute,hamstring, hip flexor strengthening, TA activation/core bracing, core stabilization, self pelvic/sacral alignment techniques, education on positioning to return to PLOF. Pt prior to onset of current condition had min pain with able to complete full ADLs and work activities. Patient received education on their current pathology and how their condition effects them with their functional activities. Patient understood discussion and questions were answered. Patient understands their activity limitations and understands rational for treatment progression.      Plan for Next Session: --       Time In / Time Out:   4916-6200      Timed Code/Total Treatment Minutes:  36'   (1) PT eval    (1) TE       Next Progress Note due:  10th visit      Plan of Care Interventions:  [x] Therapeutic Exercise  [] Modalities:  [x] Therapeutic Activity     [] Ultrasound  [] Estim  [x] Gait Training      [] Cervical Traction [] Lumbar Traction  [x] Neuromuscular Re-education    [] Cold/hotpack [] Iontophoresis   [x] Instruction in HEP      [] Vasopneumatic   [] Dry Needling    [] Manual Therapy               [] Aquatic Therapy              Electronically signed by:  Alexa Germain, PT, DPT, CSCS 5/9/2022, 8:34 AM

## 2022-05-20 ENCOUNTER — HOSPITAL ENCOUNTER (OUTPATIENT)
Dept: PHYSICAL THERAPY | Age: 62
Setting detail: THERAPIES SERIES
Discharge: HOME | End: 2022-05-20
Payer: COMMERCIAL

## 2022-05-20 PROCEDURE — 97110 THERAPEUTIC EXERCISES: CPT

## 2022-05-20 NOTE — FLOWSHEET NOTE
Outpatient Physical Therapy  [place]           [x] Phone: [phone]   Fax: [phone]  Leila Duran           [] Phone: [phone]   Fax: [phone]        Physical Therapy Daily Treatment Note  Date:  2022    Patient Name:  Berta Clayton    :  1960  MRN: [id number]  Restrictions/Precautions: NONE  Diagnosis:   Sacrococcygeal disorders, not elsewhere classified [M53.3] Diagnosis: sacroiliac pain  Date of Injury/Surgery: --  Treatment Diagnosis:  Decreased tolerance to activity limited by SI pain  Insurance/Certification information: [facility]  Referring Physician:  MD Dr. David Angel   PCP: Jaron Crouch MD  Next Doctor Visit:  --  Plan of care signed (Y/N):  N, sent 22   Outcome Measure: Oswestry: 8/50  Visit# / total visits:   2/10  Pain level: 0/10   Goals:     Patient goals: improve pain and strength  Short term goals  Time Frame for Short term goals: Pt demo I w/ HEP and symptom management     Pt demo Oswestry score <8/50 to improve tolerance to ADL's and work duties  Pt demo 5/5 BLE strength in all directions to improve tolerance to work duties  [employer] ability to squat x10 with pain reported <1/10 and proper core brace  Pt reports ability to sleep in bed with pain <2/10 to improve tolerance to sleeping position    Summary of Evaluation:   Pt is 57year old female with chronic SI pain. Pt c/o of intermittent and ongoing pain along bilat SI joints, more bothersome with squatting, lifting, and prolonged positioning. She currently sleeps on the couch due to increased movement and pain in bed. She has been seeing a chiropractor for several years. Pt demo deficits this date that include functional pelvic mis-alignment, weakness of BLE, tenderness along bilat SI joints, impaired sensation along R lateral thigh, reduced core/pelvic control .  Pt will benefit with PT services with pelvic/sacral mobilizations if appropriate, glute,hamstring, hip flexor strengthening, TA activation/core bracing, core stabilization, self pelvic/sacral alignment techniques, education on positioning to return to PLOF. Pt prior to onset of current condition had min pain with able to complete full ADLs and work activities. Patient received education on their current pathology and how their condition effects them with their functional activities. Patient understood discussion and questions were answered. Patient understands their activity limitations and understands rational for treatment progression. Subjective:  Pt arrives to tx session reporting 0/10 pain in LB. Reports compliance with HEP. Any changes in Ambulatory Summary Sheet? None        Objective:  See eval   COVID screening questions were asked and patient attested that there had been no contact or symptoms        Exercises: (No more than 4 columns)   Exercise/Equipment 5/9/22 #1 5/20/22 #2 Date           WARM UP      Nu Step     L5 5' bike instead          TABLE      *SLR  2x10    *PPT  2x10    *Bridge w/ adductor squeeze  2x10    Prone Extension  2x10 press up    Seated Hamstring Curl Machine     2x10 #30    TA holds w/ marching   2x10     Dead bug  2x10    LTR  2x10    DKTC  2x10 w/ SB    Hip flexor stretch  30\" x2 R                STANDING      Shuttle Press  2x10 2 C    SB Squat w/ core brace                                         PROPRIOCEPTION                                    MODALITIES                      Other Therapeutic Activities/Education: Patient received education on their current pathology and how their condition effects them with their functional activities. Patient understood discussion and questions were answered. Patient understands their activity limitations and understands rational for treatment progression. Home Exercise Program:  HO issued, reviewed and discussed with patient. Pt agreed to comply.         Manual Treatments:  --      Modalities:  --      Communication with other providers: ktaharina sent 5/9/22      Assessment:  Pt tolerates tx session well without adverse reactions or complications. Tolerates added exercises well without increased pain. Pt will continue to benefit from PT to restore PLOF. Pt is 57year old female with chronic SI pain. Pt c/o of intermittent and ongoing pain along bilat SI joints, more bothersome with squatting, lifting, and prolonged positioning. She currently sleeps on the couch due to increased movement and pain in bed. She has been seeing a chiropractor for several years. Pt demo deficits this date that include functional pelvic mis-alignment, weakness of BLE, tenderness along bilat SI joints, impaired sensation along R lateral thigh, reduced core/pelvic control . Pt will benefit with PT services with pelvic/sacral mobilizations if appropriate, glute,hamstring, hip flexor strengthening, TA activation/core bracing, core stabilization, self pelvic/sacral alignment techniques, education on positioning to return to PLOF. Pt prior to onset of current condition had min pain with able to complete full ADLs and work activities. Patient received education on their current pathology and how their condition effects them with their functional activities. Patient understood discussion and questions were answered. Patient understands their activity limitations and understands rational for treatment progression.      Plan for Next Session: --       Time In / Time Out:   1600 / 1638      Timed Code/Total Treatment Minutes: 45' / 45'      3 TE      Next Progress Note due:  10th visit      Plan of Care Interventions:  [x] Therapeutic Exercise  [] Modalities:  [x] Therapeutic Activity     [] Ultrasound  [] Estim  [x] Gait Training      [] Cervical Traction [] Lumbar Traction  [x] Neuromuscular Re-education    [] Cold/hotpack [] Iontophoresis   [x] Instruction in HEP      [] Vasopneumatic   [] Dry Needling    [] Manual Therapy               [] Aquatic Therapy Electronically signed by:  Yonatan Hong, PTA    5/20/22

## 2022-05-26 ENCOUNTER — HOSPITAL ENCOUNTER (OUTPATIENT)
Dept: PHYSICAL THERAPY | Age: 62
Setting detail: THERAPIES SERIES
Discharge: HOME | End: 2022-05-26
Payer: COMMERCIAL

## 2022-05-26 PROCEDURE — 97110 THERAPEUTIC EXERCISES: CPT

## 2022-05-26 NOTE — FLOWSHEET NOTE
Outpatient Physical Therapy  [place]           [x] Phone: [phone]   Fax: [phone]  [place]           [] Phone: [phone]   Fax: [phone]        Physical Therapy Daily Treatment Note  Date:  2022    Patient Name:  Ruth Gee    :  1960  MRN: [id number]  Restrictions/Precautions: NONE  Diagnosis:   Sacrococcygeal disorders, not elsewhere classified [M53.3] Diagnosis: sacroiliac pain  Date of Injury/Surgery: --  Treatment Diagnosis:  Decreased tolerance to activity limited by SI pain  Insurance/Certification information: [facility]  Referring Physician:  MD Dr. Gregory Blankenship   PCP: Derek Fernandez MD  Next Doctor Visit:  --  Plan of care signed (Y/N):  Y, sent 22   Outcome Measure: Oswestry: 8/50  Visit# / total visits:   310  Pain level: 0/10   Goals:     Patient goals: improve pain and strength  Short term goals  Time Frame for Short term goals: Pt demo I w/ HEP and symptom management     Pt demo Oswestry score <8/50 to improve tolerance to ADL's and work duties  Pt demo 5/5 BLE strength in all directions to improve tolerance to work duties  [employer] ability to squat x10 with pain reported <1/10 and proper core brace  Pt reports ability to sleep in bed with pain <2/10 to improve tolerance to sleeping position    Summary of Evaluation:   Pt is 57year old female with chronic SI pain. Pt c/o of intermittent and ongoing pain along bilat SI joints, more bothersome with squatting, lifting, and prolonged positioning. She currently sleeps on the couch due to increased movement and pain in bed. She has been seeing a chiropractor for several years. Pt demo deficits this date that include functional pelvic mis-alignment, weakness of BLE, tenderness along bilat SI joints, impaired sensation along R lateral thigh, reduced core/pelvic control .  Pt will benefit with PT services with pelvic/sacral mobilizations if appropriate, glute,hamstring, hip flexor strengthening, TA activation/core bracing, core stabilization, self pelvic/sacral alignment techniques, education on positioning to return to PLOF. Pt prior to onset of current condition had min pain with able to complete full ADLs and work activities. Patient received education on their current pathology and how their condition effects them with their functional activities. Patient understood discussion and questions were answered. Patient understands their activity limitations and understands rational for treatment progression. Subjective:  Pt arrives to tx session reporting 4/10 soreness today. Reports no increased pain or soreness from HEP. No N/T reported since the start pf therapy. She has been biking and has not flared up. Any changes in Ambulatory Summary Sheet? None        Objective:  See eval   COVID screening questions were asked and patient attested that there had been no contact or symptoms        Exercises: (No more than 4 columns)   Exercise/Equipment 5/9/22 #1 5/20/22 #2 5/26/22 #3           WARM UP      Nu Step     L5 5' bike instead 5' bike          TABLE      *SLR  2x10 2x10 ea side   *PPT  2x10 --   *Bridge w/ adductor squeeze  2x10 2x10    Prone Extension  2x10 press up 2x10 ea side, pause at the top   Seated Hamstring Curl Machine     2x10 #30 2x10 45#   TA holds w/ marching   2x10  2x10   Dead bug  2x10 2x10 w/ TA hold   LTR  2x10 --   DKTC  2x10 w/ SB --   Hip flexor stretch  30\" x2 R --               STANDING      Shuttle Press  2x10 2 C 2x10 2C   Squat w/ core brace   2x10 treadmill   Pallof Press   2x10 10# FM   STS w/ TA hold   2x10 10# MB   Hip Extension   x10                    PROPRIOCEPTION                                    MODALITIES                      Other Therapeutic Activities/Education: Patient received education on their current pathology and how their condition effects them with their functional activities. Patient understood discussion and questions were answered. Patient understands their activity limitations and understands rational for treatment progression. Home Exercise Program:  HO issued, reviewed and discussed with patient. Pt agreed to comply. Manual Treatments:  --      Modalities:  --      Communication with other providers:  katharina sent 5/9/22      Assessment:  Pt tolerates tx session well without adverse reactions or complications. She continues to be weak along her pelvis and core, but is making good strides with her HEP. She has two more visits scheduled prior to her vacation and would like to wait until after she sees Dr. Bailee Hernandez on June 22nd to schedule more. End of session: 2/10     Pt is 57year old female with chronic SI pain. Pt c/o of intermittent and ongoing pain along bilat SI joints, more bothersome with squatting, lifting, and prolonged positioning. She currently sleeps on the couch due to increased movement and pain in bed. She has been seeing a chiropractor for several years. Pt demo deficits this date that include functional pelvic mis-alignment, weakness of BLE, tenderness along bilat SI joints, impaired sensation along R lateral thigh, reduced core/pelvic control . Pt will benefit with PT services with pelvic/sacral mobilizations if appropriate, glute,hamstring, hip flexor strengthening, TA activation/core bracing, core stabilization, self pelvic/sacral alignment techniques, education on positioning to return to PLOF. Pt prior to onset of current condition had min pain with able to complete full ADLs and work activities. Patient received education on their current pathology and how their condition effects them with their functional activities. Patient understood discussion and questions were answered. Patient understands their activity limitations and understands rational for treatment progression.      Plan for Next Session: --       Time In / Time Out:   1600 / 1638      Timed Code/Total Treatment Minutes: 45' / 45'      3 TE      Next Progress Note due:  10th visit      Plan of Care Interventions:  [x] Therapeutic Exercise  [] Modalities:  [x] Therapeutic Activity     [] Ultrasound  [] Estim  [x] Gait Training      [] Cervical Traction [] Lumbar Traction  [x] Neuromuscular Re-education    [] Cold/hotpack [] Iontophoresis   [x] Instruction in HEP      [] Vasopneumatic   [] Dry Needling    [] Manual Therapy               [] Aquatic Therapy              Electronically signed by:  Leah Andres PT, DPT, CSCS

## 2022-06-02 ENCOUNTER — HOSPITAL ENCOUNTER (OUTPATIENT)
Dept: PHYSICAL THERAPY | Age: 62
Setting detail: THERAPIES SERIES
Discharge: HOME | End: 2022-06-02
Payer: COMMERCIAL

## 2022-06-02 PROCEDURE — 97110 THERAPEUTIC EXERCISES: CPT

## 2022-06-02 NOTE — FLOWSHEET NOTE
Outpatient Physical Therapy  [place]           [x] Phone: [phone]   Fax: [phone]  Lizz Nazario           [] Phone: [phone]   Fax: [phone]        Physical Therapy Daily Treatment Note  Date:  2022    Patient Name:  Ruth Gee    :  1960  MRN: [id number]  Restrictions/Precautions: NONE  Diagnosis:   Sacrococcygeal disorders, not elsewhere classified [M53.3] Diagnosis: sacroiliac pain  Date of Injury/Surgery: --  Treatment Diagnosis:  Decreased tolerance to activity limited by SI pain  Insurance/Certification information: [facility]  Referring Physician:  MD Dr. Gregory Blankenship   PCP: Derek Fernandez MD  Next Doctor Visit:  --  Plan of care signed (Y/N):  Y, sent 22   Outcome Measure: Oswestry: 8/50  Visit# / total visits:   4/10  Pain level: 0/10   Goals:     Patient goals: improve pain and strength  Short term goals  Time Frame for Short term goals: Pt demo I w/ HEP and symptom management     Pt demo Oswestry score <8/50 to improve tolerance to ADL's and work duties  Pt demo 5/5 BLE strength in all directions to improve tolerance to work duties  [employer] ability to squat x10 with pain reported <1/10 and proper core brace  Pt reports ability to sleep in bed with pain <2/10 to improve tolerance to sleeping position    Summary of Evaluation:   Pt is 57year old female with chronic SI pain. Pt c/o of intermittent and ongoing pain along bilat SI joints, more bothersome with squatting, lifting, and prolonged positioning. She currently sleeps on the couch due to increased movement and pain in bed. She has been seeing a chiropractor for several years. Pt demo deficits this date that include functional pelvic mis-alignment, weakness of BLE, tenderness along bilat SI joints, impaired sensation along R lateral thigh, reduced core/pelvic control .  Pt will benefit with PT services with pelvic/sacral mobilizations if appropriate, glute,hamstring, hip flexor strengthening, TA activation/core bracing, core stabilization, self pelvic/sacral alignment techniques, education on positioning to return to PLOF. Pt prior to onset of current condition had min pain with able to complete full ADLs and work activities. Patient received education on their current pathology and how their condition effects them with their functional activities. Patient understood discussion and questions were answered. Patient understands their activity limitations and understands rational for treatment progression. Subjective:  Pt arrives to tx session reporting 4/10 soreness today in her R hip area. States it has not worsened, but was expecting it to be better by now. Any changes in Ambulatory Summary Sheet?   None      Objective:  See eval   COVID screening questions were asked and patient attested that there had been no contact or symptoms    Cues for prone hip extension, slight rise of pelvis from table       Exercises: (No more than 4 columns)   Exercise/Equipment 5/9/22 #1 5/20/22 #2 5/26/22 #3 6/2/22 #4            WARM UP       Nu Step     L5 5' bike instead 5' bike  5' bike          TABLE       *SLR  2x10 2x10 ea side 2x10 1#   *PPT  2x10 --    *Bridge w/ adductor squeeze  2x10 2x10  -   Prone Extension  2x10 press up 2x10 ea side, pause at the top 2x10 ea side, pause at the top   Seated Hamstring Curl Machine     2x10 #30 2x10 45# 2x10 45#   TA holds w/ marching   2x10  2x10 2x10 w/ OH hold 6# MB   Dead bug  2x10 2x10 w/ TA hold 2x20 alt    LTR  2x10 --    DKTC  2x10 w/ SB --    Hip flexor stretch  30\" x2 R --                  STANDING       Shuttle Press  2x10 2 C 2x10 2C 2x10 3C DL press   Squat w/ core brace   2x10 treadmill 2x10 at treadmill   Pallof Press   2x10 10# FM 2x10 10# FM   STS w/ TA hold   2x10 10# MB 2x10 10# MB bottom tap   Hip Extension   x10 2x10 2#   Lateral heel tap    2x10 ea side 4\"               PROPRIOCEPTION                                          MODALITIES Other Therapeutic Activities/Education: Patient received education on their current pathology and how their condition effects them with their functional activities. Patient understood discussion and questions were answered. Patient understands their activity limitations and understands rational for treatment progression. Home Exercise Program:  HO issued, reviewed and discussed with patient. Pt agreed to comply. Manual Treatments:  --      Modalities:  --      Communication with other providers:  malissaal sent 5/9/22      Assessment:  Boaz Cantrell demonstrates good tolerance to today's session. Noted increased difficulty with core control on pallof press and increased fatigue following. Will be going on vacation on June 9th and will call to schedule following. End of session: 2/10     Pt is 57year old female with chronic SI pain. Pt c/o of intermittent and ongoing pain along bilat SI joints, more bothersome with squatting, lifting, and prolonged positioning. She currently sleeps on the couch due to increased movement and pain in bed. She has been seeing a chiropractor for several years. Pt demo deficits this date that include functional pelvic mis-alignment, weakness of BLE, tenderness along bilat SI joints, impaired sensation along R lateral thigh, reduced core/pelvic control . Pt will benefit with PT services with pelvic/sacral mobilizations if appropriate, glute,hamstring, hip flexor strengthening, TA activation/core bracing, core stabilization, self pelvic/sacral alignment techniques, education on positioning to return to PLOF. Pt prior to onset of current condition had min pain with able to complete full ADLs and work activities. Patient received education on their current pathology and how their condition effects them with their functional activities. Patient understood discussion and questions were answered.  Patient understands their activity limitations and understands rational for treatment progression.      Plan for Next Session: --       Time In / Time Out:   1279-7983      Timed Code/Total Treatment Minutes: 36'     3 TE      Next Progress Note due:  10th visit      Plan of Care Interventions:  [x] Therapeutic Exercise  [] Modalities:  [x] Therapeutic Activity     [] Ultrasound  [] Estim  [x] Gait Training      [] Cervical Traction [] Lumbar Traction  [x] Neuromuscular Re-education    [] Cold/hotpack [] Iontophoresis   [x] Instruction in HEP      [] Vasopneumatic   [] Dry Needling    [] Manual Therapy               [] Aquatic Therapy              Electronically signed by:  Vera Faye, PT, DPT, CSCS

## 2022-06-10 ENCOUNTER — TELEPHONE (OUTPATIENT)
Dept: FAMILY MEDICINE CLINIC | Age: 62
End: 2022-06-10

## 2022-06-10 NOTE — TELEPHONE ENCOUNTER
Patient called stating she is in [place] and she has forgotten her ADHD medication here in [place]. Patient stated she really needs her Strattera 60 mg. Can you please send it to the [facility] in [place], [place] so she can have her medication. She will be leave [place] and head to [place] next she really hoping you can help her by sending that medication.

## 2022-06-10 NOTE — TELEPHONE ENCOUNTER
(Please see note to [facility])  Patient called back wanting to know if Dr. Jayden Munoz can send Strattera 60 mg on Monday when she returns to the office. Patient stated [place] when [place] was [place]. Patient stated that it is her fault that she left her medication in [place].  Please advise     Thank you

## 2022-06-22 ENCOUNTER — OFFICE VISIT (OUTPATIENT)
Dept: FAMILY MEDICINE CLINIC | Age: 62
End: 2022-06-22
Payer: COMMERCIAL

## 2022-06-22 VITALS
OXYGEN SATURATION: 97 % | SYSTOLIC BLOOD PRESSURE: 100 MMHG | BODY MASS INDEX: 20.2 KG/M2 | DIASTOLIC BLOOD PRESSURE: 62 MMHG | HEART RATE: 78 BPM | WEIGHT: 114 LBS | HEIGHT: 63 IN

## 2022-06-22 DIAGNOSIS — D17.1 LIPOMA OF TORSO: ICD-10-CM

## 2022-06-22 DIAGNOSIS — B00.9 HSV-1 (HERPES SIMPLEX VIRUS 1) INFECTION: ICD-10-CM

## 2022-06-22 DIAGNOSIS — B35.1 ONYCHOMYCOSIS: ICD-10-CM

## 2022-06-22 DIAGNOSIS — R73.02 IMPAIRED GLUCOSE TOLERANCE: ICD-10-CM

## 2022-06-22 DIAGNOSIS — M25.511 CHRONIC RIGHT SHOULDER PAIN: ICD-10-CM

## 2022-06-22 DIAGNOSIS — F98.8 ATTENTION DEFICIT DISORDER (ADD) WITHOUT HYPERACTIVITY: ICD-10-CM

## 2022-06-22 DIAGNOSIS — Z12.31 ENCOUNTER FOR SCREENING MAMMOGRAM FOR MALIGNANT NEOPLASM OF BREAST: ICD-10-CM

## 2022-06-22 DIAGNOSIS — R53.83 FATIGUE, UNSPECIFIED TYPE: ICD-10-CM

## 2022-06-22 DIAGNOSIS — Z00.00 ANNUAL PHYSICAL EXAM: Primary | ICD-10-CM

## 2022-06-22 DIAGNOSIS — G89.29 CHRONIC RIGHT SHOULDER PAIN: ICD-10-CM

## 2022-06-22 LAB
ALT SERPL-CCNC: 18 U/L (ref 10–40)
ANION GAP SERPL CALCULATED.3IONS-SCNC: 12 MMOL/L (ref 3–16)
AST SERPL-CCNC: 28 U/L (ref 15–37)
BASOPHILS ABSOLUTE: 0 K/UL (ref 0–0.2)
BASOPHILS RELATIVE PERCENT: 0.7 %
BUN BLDV-MCNC: 12 MG/DL (ref 7–20)
CALCIUM SERPL-MCNC: 9.8 MG/DL (ref 8.3–10.6)
CHLORIDE BLD-SCNC: 104 MMOL/L (ref 99–110)
CO2: 25 MMOL/L (ref 21–32)
CREAT SERPL-MCNC: 0.6 MG/DL (ref 0.6–1.2)
EOSINOPHILS ABSOLUTE: 0.1 K/UL (ref 0–0.6)
EOSINOPHILS RELATIVE PERCENT: 1.8 %
GFR AFRICAN AMERICAN: >60
GFR NON-AFRICAN AMERICAN: >60
GLUCOSE BLD-MCNC: 85 MG/DL (ref 70–99)
HCT VFR BLD CALC: 41.5 % (ref 36–48)
HEMOGLOBIN: 14 G/DL (ref 12–16)
LYMPHOCYTES ABSOLUTE: 1.5 K/UL (ref 1–5.1)
LYMPHOCYTES RELATIVE PERCENT: 26.1 %
MCH RBC QN AUTO: 30.2 PG (ref 26–34)
MCHC RBC AUTO-ENTMCNC: 33.7 G/DL (ref 31–36)
MCV RBC AUTO: 89.8 FL (ref 80–100)
MONOCYTES ABSOLUTE: 0.4 K/UL (ref 0–1.3)
MONOCYTES RELATIVE PERCENT: 7.2 %
NEUTROPHILS ABSOLUTE: 3.6 K/UL (ref 1.7–7.7)
NEUTROPHILS RELATIVE PERCENT: 64.2 %
PDW BLD-RTO: 13.2 % (ref 12.4–15.4)
PLATELET # BLD: 315 K/UL (ref 135–450)
PMV BLD AUTO: 8.9 FL (ref 5–10.5)
POTASSIUM SERPL-SCNC: 4.3 MMOL/L (ref 3.5–5.1)
RBC # BLD: 4.62 M/UL (ref 4–5.2)
SODIUM BLD-SCNC: 141 MMOL/L (ref 136–145)
WBC # BLD: 5.7 K/UL (ref 4–11)

## 2022-06-22 PROCEDURE — 1036F TOBACCO NON-USER: CPT | Performed by: FAMILY MEDICINE

## 2022-06-22 PROCEDURE — G8427 DOCREV CUR MEDS BY ELIG CLIN: HCPCS | Performed by: FAMILY MEDICINE

## 2022-06-22 PROCEDURE — 99213 OFFICE O/P EST LOW 20 MIN: CPT | Performed by: FAMILY MEDICINE

## 2022-06-22 PROCEDURE — 36415 COLL VENOUS BLD VENIPUNCTURE: CPT | Performed by: FAMILY MEDICINE

## 2022-06-22 PROCEDURE — 99396 PREV VISIT EST AGE 40-64: CPT | Performed by: FAMILY MEDICINE

## 2022-06-22 PROCEDURE — 3017F COLORECTAL CA SCREEN DOC REV: CPT | Performed by: FAMILY MEDICINE

## 2022-06-22 PROCEDURE — G8420 CALC BMI NORM PARAMETERS: HCPCS | Performed by: FAMILY MEDICINE

## 2022-06-22 RX ORDER — ATOMOXETINE 60 MG/1
60 CAPSULE ORAL DAILY
Qty: 90 CAPSULE | Refills: 1 | Status: SHIPPED | OUTPATIENT
Start: 2022-08-01

## 2022-06-22 RX ORDER — TERBINAFINE HYDROCHLORIDE 250 MG/1
250 TABLET ORAL DAILY
Qty: 90 TABLET | Refills: 0 | Status: SHIPPED | OUTPATIENT
Start: 2022-06-22 | End: 2022-09-20

## 2022-06-22 RX ORDER — VALACYCLOVIR HYDROCHLORIDE 1 G/1
TABLET, FILM COATED ORAL
Qty: 24 TABLET | Refills: 0 | Status: SHIPPED | OUTPATIENT
Start: 2022-06-22

## 2022-06-22 SDOH — ECONOMIC STABILITY: FOOD INSECURITY: WITHIN THE PAST 12 MONTHS, YOU WORRIED THAT YOUR FOOD WOULD RUN OUT BEFORE YOU GOT MONEY TO BUY MORE.: NEVER TRUE

## 2022-06-22 SDOH — ECONOMIC STABILITY: FOOD INSECURITY: WITHIN THE PAST 12 MONTHS, THE FOOD YOU BOUGHT JUST DIDN'T LAST AND YOU DIDN'T HAVE MONEY TO GET MORE.: NEVER TRUE

## 2022-06-22 ASSESSMENT — SOCIAL DETERMINANTS OF HEALTH (SDOH): HOW HARD IS IT FOR YOU TO PAY FOR THE VERY BASICS LIKE FOOD, HOUSING, MEDICAL CARE, AND HEATING?: VERY HARD

## 2022-06-22 NOTE — PATIENT INSTRUCTIONS
BRING YOUR MEDICATIONS TO ALL APPOINTMENTS    Check MY CHART for test results. If you have forgotten your password, call [phone]. The diagnoses and medications listed in this after visit summary may not be up to date. Check MY CHART in 28-48 hours forcorrections. Late cancellation policy: So that we can better accommodate people who are sick, please give our office 24 hour notice for an appointment cancellation. Missed appointments: Your care is very important to us. It is important that you keep your scheduled appointments. Multiple missed appointments will lead to a dismissal from the office. Patients arriving late will be worked into the schedule as time permits, with patients arriving on time taken as scheduled. Late arriving patients are more than welcome to wait or reschedule their appointments. Please allow 5-7 business days for paperwork to be processed. HEALTH FACTS      1. Take your blood pressure medications at bedtime to reduce your chance of heart attack or stroke  2. Follow these tips to reduce childhood obesity: Reduce unnecessary exposure to antibiotics, consume whole milk instead of skim milk, watch public TV instead of regular TV (less exposure to junk food commercials), and reduce traumatic experiences. 3. 2 eggs per day are good for your heart  4. Alternate day fasting does promote weight loss. Skipping breakfast increases your risk of obesity  5. Artificially sweetened drinks increase all cause mortality (strokes, body mass index, cardiovascular disease)  6. Kale consumption can reduce onset of dementia  7. Walking at least 8000 steps per day and resistance exercise 2-3 x per week are good for your heart  8. Brushing teeth 3 times per day can decrease chance of getting diabetes  9. Antibiotic use is associated with a lifetime increased risk of breast cancer and heart disease.

## 2022-06-22 NOTE — PROGRESS NOTES
History and Physical      Charlie Gupta  YOB: 1960    Date of Service:  6/22/2022    Chief Complaint:   Chelo Porras is a 57 y.o. female who presents for complete physical examination. HPI: Here for physical    Fatigue: very tired when she wakes up in the morning. Does not snore. Gets plenty of sleep. Toe nail fungus: podiatrist gave her topical treatment 2 years ago and it didn't work. Asking for pills like what her sister go    Chronic right shoulder pain: had surgery years ago. Feels that she never got proper PT. Would like to get re-evaluation    Painful lump lower back. ADD: Adderall helps. When on vacation, she forgot her pills. Had neighbor mail the pills to her and she never got them. Is without pills for the summer and will see how she does. 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 Hx abnormal PAP: no  Sexual activity: none   Alcohol rare  Exercise: frequent  Seatbelt use:Yes  Sees dentist every 6 months: yes  Intimate partner violence?  No  Substance abuse? no    Wt Readings from Last 3 Encounters:   06/22/22 114 lb (51.7 kg)   04/27/22 121 lb (54.9 kg)   04/06/22 119 lb 12.8 oz (54.3 kg)     BP Readings from Last 3 Encounters:   06/22/22 100/62   04/27/22 (!) 140/80   04/27/22 116/70       Patient Active Problem List   Diagnosis    Gluten-sensitive enteropathy    Allergic rhinitis    HSV-1 (herpes simplex virus 1) infection    Gastroesophageal reflux disease without esophagitis    Slow transit constipation    ADD (attention deficit disorder)    Family history of osteoporosis    Kyphosis    Impingement syndrome of right shoulder    Right rotator cuff tendinitis    Impaired glucose tolerance    Osteoarthritis of lumbar spine       Preventive Care:  Health Maintenance   Topic Date Due    Breast cancer screen  06/29/2022    Flu vaccine (Season Ended) 09/01/2022    A1C test (Diabetic or Prediabetic)  04/09/2023    Depression Screen 04/27/2023    DTaP/Tdap/Td vaccine (2 - Td or Tdap) 12/10/2023    Cervical cancer screen  08/09/2024    Lipids  12/17/2025    Colorectal Cancer Screen  07/30/2028    Shingles vaccine  Completed    COVID-19 Vaccine  Completed    Hepatitis C screen  Completed    HIV screen  Completed    Hepatitis A vaccine  Aged Out    Hepatitis B vaccine  Aged Out    Hib vaccine  Aged Out    Meningococcal (ACWY) vaccine  Aged Out    Pneumococcal 0-64 years Vaccine  Aged Out      Lipid panel:   Lab Results   Component Value Date    CHOL 191 12/17/2020    TRIG 83 12/17/2020    HDL 78 (H) 12/17/2020    LDLCALC 96 12/17/2020    LDLDIRECT 100 (H) 07/06/2015          Immunization History   Administered Date(s) Administered    COVID-19, Moderna, Primary or Immunocompromised, PF, 100mcg/0.5mL 01/06/2021, 02/03/2021, 11/06/2021    Influenza A ([id number]) Vaccine PF IM 10/28/2009    Influenza Vaccine, unspecified formulation 10/19/2018    Influenza Virus Vaccine 10/19/2018    Influenza, MDCK Quadv, with preserv IM (Flucelvax 2 yrs and older) 10/19/2018, 10/09/2020    Influenza, Quadv, IM, PF (6 mo and older Fluzone, Flulaval, Fluarix, and 3 yrs and older Afluria) 11/18/2019    Tdap (Boostrix, Adacel) 12/10/2013    Zoster Recombinant (Shingrix) 08/06/2018, 01/23/2019         Outpatient Medications Marked as Taking for the 6/22/22 encounter (Office Visit) with Jt Downing MD   Medication Sig Dispense Refill    terbinafine (LAMISIL) 250 MG tablet Take 1 tablet by mouth daily 90 tablet 0    [START ON 8/1/2022] atomoxetine (STRATTERA) 60 MG capsule Take 1 capsule by mouth daily 90 capsule 1    valACYclovir (VALTREX) 1 g tablet 2 TABS BY MOUTH EVERY 12 HOURS HOURS X 1 DAY FOR COLD SORES 24 tablet 0    fluticasone (FLONASE) 50 MCG/ACT nasal spray 2 sprays by Each Nostril route daily 3 g 3    Menaquinone-7 (VITAMIN K2 PO) Take 1 tablet by mouth daily      thyroid (ARMOUR THYROID) 15 MG tablet Take 1 tablet by mouth daily  RESTASIS 0.05 % ophthalmic emulsion Apply 1 drop to eye 2 times daily  1    Cholecalciferol (VITAMIN D3 PO) Take by mouth         Past Medical History:   Diagnosis Date    ADD (attention deficit disorder)     Celiac disease     Nausea      Past Surgical History:   Procedure Laterality Date    COLONOSCOPY  2008    COLONOSCOPY  07/27/2018    repeat in 10 years    ENDOSCOPY, COLON, DIAGNOSTIC      PATELLA SURGERY Left 2004    SHOULDER SURGERY Right 2015    Fester    TONSILLECTOMY      WISDOM TOOTH EXTRACTION       Family History   Problem Relation Age of Onset    Dementia Mother 79        Picks Disease    Celiac Disease Mother     Osteoporosis Mother     Heart Attack Father 68    Cancer Father         Lymphoma    Thyroid Disease Sister         Graves    Osteoporosis Sister     Thyroid Disease Sister     Celiac Disease Sister     Rheum Arthritis Sister          Review of Systems:      Physical Exam:       Vitals:    06/22/22 1032   BP: 100/62   Site: Right Upper Arm   Position: Sitting   Cuff Size: Medium Adult   Pulse: 78   SpO2: 97%   Weight: 114 lb (51.7 kg)   Height: 5' 3.25\" (1.607 m)       Body mass index is 20.03 kg/m². Physical Exam  Vitals and nursing note reviewed. Constitutional:       General: She is not in acute distress. Appearance: Normal appearance. She is well-developed. HENT:      Head: Normocephalic and atraumatic. Right Ear: Hearing, tympanic membrane and external ear normal.      Left Ear: Hearing, tympanic membrane and external ear normal.      Nose: Septal deviation present. No nasal deformity, laceration, mucosal edema or rhinorrhea. Right Sinus: No maxillary sinus tenderness or frontal sinus tenderness. Left Sinus: No maxillary sinus tenderness or frontal sinus tenderness. Mouth/Throat:      Mouth: Mucous membranes are moist.      Pharynx: No oropharyngeal exudate or posterior oropharyngeal erythema.    Eyes:      Conjunctiva/sclera: Conjunctivae normal.      Pupils: Pupils are equal, round, and reactive to light. Neck:      Thyroid: No thyromegaly. Trachea: No tracheal deviation. Cardiovascular:      Rate and Rhythm: Normal rate and regular rhythm. Heart sounds: Normal heart sounds, S1 normal and S2 normal. No friction rub. No gallop. Pulmonary:      Effort: Pulmonary effort is normal. No respiratory distress. Breath sounds: Normal breath sounds. No wheezing or rales. Chest:   Breasts: Breasts are symmetrical.      Right: No inverted nipple, mass, nipple discharge, skin change or tenderness. Left: No inverted nipple, mass, nipple discharge, skin change or tenderness. Abdominal:      General: There is no distension. Palpations: Abdomen is soft. There is no hepatomegaly, splenomegaly or mass. Tenderness: There is no abdominal tenderness. Musculoskeletal:      Cervical back: Neck supple. Back:       Right lower leg: No edema. Left lower leg: No edema. Feet:    Feet:      Right foot:      Toenail Condition: Fungal disease present. Left foot:      Toenail Condition: Fungal disease present. Lymphadenopathy:      Head:      Right side of head: No submental, submandibular or posterior auricular adenopathy. Left side of head: No submental, submandibular or posterior auricular adenopathy. Cervical:      Right cervical: No superficial, deep or posterior cervical adenopathy. Left cervical: No superficial, deep or posterior cervical adenopathy. Skin:     General: Skin is warm and dry. Coloration: Skin is not jaundiced. Neurological:      General: No focal deficit present. Mental Status: She is alert and oriented to person, place, and time. Mental status is at baseline. Cranial Nerves: Cranial nerves are intact. Sensory: Sensation is intact. Motor: Motor function is intact. Gait: Gait is intact.       Deep Tendon Reflexes:      Reflex Scores:       Patellar reflexes are 2+ on the right side and 2+ on the left side. Psychiatric:         Attention and Perception: She is attentive. Speech: Speech normal.         Behavior: Behavior normal.         Thought Content: Thought content normal.         Judgment: Judgment normal.          Wt Readings from Last 3 Encounters:   06/22/22 114 lb (51.7 kg)   04/27/22 121 lb (54.9 kg)   04/06/22 119 lb 12.8 oz (54.3 kg)     BP Readings from Last 3 Encounters:   06/22/22 100/62   04/27/22 (!) 140/80   04/27/22 116/70          The 10-year ASCVD risk score (Aime Kennedy, et al., 2013) is: 2%    Values used to calculate the score:      Age: 57 years      Sex: Female      Is Non- : No      Diabetic: No      Tobacco smoker: No      Systolic Blood Pressure: 086 mmHg      Is BP treated: No      HDL Cholesterol: 78 mg/dL      Total Cholesterol: 191 mg/dL        Lab Review:   Abstract on 06/08/2022   Component Date Value    TSH 04/09/2022 2.230     Hemoglobin A1C 04/09/2022 5.8     T3, Free 04/09/2022 2.9     T4 Free 04/09/2022 1.31     Vit D, 25-Hydroxy 04/09/2022 72.5     Ferritin 04/09/2022 73         Assessment/Plan:    Army Cherry was seen today for annual exam.    Diagnoses and all orders for this visit:    Annual physical exam    Encounter for screening mammogram for malignant neoplasm of breast  -     ROMAINE DIGITAL SCREEN W OR WO CAD BILATERAL; Future    Impaired glucose tolerance    Fatigue, unspecified type  -     CBC with Auto Differential  -     Basic Metabolic Panel    Onychomycosis  -     AST(SGOT) & ALT(SGPT)  -     terbinafine (LAMISIL) 250 MG tablet; Take 1 tablet by mouth daily  -     Culture, Fungus, Skin/Nail/Hair; Future    Chronic right shoulder pain  -     Polly Correa MD, Orthopedic Surgery, [place]    Attention deficit disorder (ADD) without hyperactivity  -     atomoxetine (STRATTERA) 60 MG capsule;  Take 1 capsule by mouth daily    HSV-1 (herpes simplex virus 1) infection  -     valACYclovir (VALTREX) 1 g tablet; 2 TABS BY MOUTH EVERY 12 HOURS HOURS X 1 DAY FOR COLD SORES    Lipoma of torso    Will check liver function since she wants to start Lamisil    Reviewed recent labs. They seem normal.  I encouraged her to start taking vitamin D if she was not diagnosed as being vitamin D deficient    Low-carb foods for the prediabetes    Continue Strattera for the attention deficit disorder    Continue as needed Valtrex for the cold sores    I think she has a lipoma. These are pretty benign. If it becomes uncomfortable, she can be referred to surgeon. She is declining referral to surgeon at this time. Goal of 5 vegetables and fruits per day or half the plate be vegetable and fruits  2-3 serving of dairy per day.      30 minutes of walking per day or 2 1/2 hours per week of walking or 8,000 steps       Per USPSTF guidelines, patient is  needing mammogram at this time

## 2022-06-30 ENCOUNTER — OFFICE VISIT (OUTPATIENT)
Dept: ORTHOPEDIC SURGERY | Age: 62
End: 2022-06-30

## 2022-06-30 VITALS
OXYGEN SATURATION: 96 % | DIASTOLIC BLOOD PRESSURE: 78 MMHG | SYSTOLIC BLOOD PRESSURE: 118 MMHG | HEIGHT: 63 IN | WEIGHT: 114 LBS | HEART RATE: 94 BPM | RESPIRATION RATE: 13 BRPM | BODY MASS INDEX: 20.2 KG/M2

## 2022-06-30 DIAGNOSIS — M75.81 RIGHT ROTATOR CUFF TENDINITIS: Primary | ICD-10-CM

## 2022-06-30 PROCEDURE — G8427 DOCREV CUR MEDS BY ELIG CLIN: HCPCS | Performed by: ORTHOPAEDIC SURGERY

## 2022-06-30 PROCEDURE — 1036F TOBACCO NON-USER: CPT | Performed by: ORTHOPAEDIC SURGERY

## 2022-06-30 PROCEDURE — 99214 OFFICE O/P EST MOD 30 MIN: CPT | Performed by: ORTHOPAEDIC SURGERY

## 2022-06-30 PROCEDURE — G8420 CALC BMI NORM PARAMETERS: HCPCS | Performed by: ORTHOPAEDIC SURGERY

## 2022-06-30 PROCEDURE — 3017F COLORECTAL CA SCREEN DOC REV: CPT | Performed by: ORTHOPAEDIC SURGERY

## 2022-06-30 NOTE — PATIENT INSTRUCTIONS
Continue weight-bearing as tolerated. Continue range of motion exercises as instructed. Ice and elevate as needed. Tylenol or Motrin for pain. Physical therapy ordered today. Follow up as needed for an injection. [facility]  [facility], [place] [address] [phone] (U)  [phone] (f)

## 2022-06-30 NOTE — PROGRESS NOTES
6/30/2022   Chief Complaint   Patient presents with    Shoulder Pain     Right        History of Present Illness:                             Steve Tipton is a 57 y.o. female who presents today for evaluation of her right shoulder pain. She has multiple areas of discomfort along the shoulder girdle extending from the base of the neck into the shoulder blade and also near the lateral aspect of the shoulder joint. She has been working with massage therapy to try to loosen up some of the tightness musculature in the shoulder. She denies any traumatic injuries or falls or instability events. She has a history of previous shoulder arthroscopy. She has been using over-the-counter anti-inflammatory medications      Patient returns to the office with a new complaint of right shoulder pain. Pt stated that the pain is more on the superoposterior aspect of her shoulder and will radiate down her back and up her neck. Pt does not recall any injuries. Pt describes the pain as sharp and is increased with riding her back or driving. Pt has been massaging the area and taking OTC medications if the pain intensifies. Medical History  Patient's medications, allergies, past medical, surgical, social and family histories were reviewed and updated as appropriate.     Past Medical History:   Diagnosis Date    ADD (attention deficit disorder)     Celiac disease     Nausea      Past Surgical History:   Procedure Laterality Date    COLONOSCOPY  2008    COLONOSCOPY  07/27/2018    repeat in 10 years    ENDOSCOPY, COLON, DIAGNOSTIC      PATELLA SURGERY Left 2004    SHOULDER SURGERY Right 2015    Fester    TONSILLECTOMY      WISDOM TOOTH EXTRACTION       Family History   Problem Relation Age of Onset    Dementia Mother 79        Picks Disease    Celiac Disease Mother     Osteoporosis Mother     Heart Attack Father 68    Cancer Father         Lymphoma    Thyroid Disease Sister         Graves    Osteoporosis on file     Current Outpatient Medications   Medication Sig Dispense Refill    terbinafine (LAMISIL) 250 MG tablet Take 1 tablet by mouth daily 90 tablet 0    [START ON 8/1/2022] atomoxetine (STRATTERA) 60 MG capsule Take 1 capsule by mouth daily 90 capsule 1    valACYclovir (VALTREX) 1 g tablet 2 TABS BY MOUTH EVERY 12 HOURS HOURS X 1 DAY FOR COLD SORES 24 tablet 0    fluticasone (FLONASE) 50 MCG/ACT nasal spray 2 sprays by Each Nostril route daily 3 g 3    Menaquinone-7 (VITAMIN K2 PO) Take 1 tablet by mouth daily      thyroid (ARMOUR THYROID) 15 MG tablet Take 1 tablet by mouth daily      RESTASIS 0.05 % ophthalmic emulsion Apply 1 drop to eye 2 times daily  1    Cholecalciferol (VITAMIN D3 PO) Take by mouth       No current facility-administered medications for this visit. Allergies   Allergen Reactions    Gluten Meal     Seasonal          Review of Systems   Constitutional: Negative for chills and fever. HENT: Negative for congestion and sneezing. Eyes: Negative for pain and redness. Respiratory: Negative for chest tightness, shortness of breath and wheezing. Cardiovascular: Negative for chest pain and palpitations. Gastrointestinal: Negative for vomiting. Musculoskeletal: Positive for arthralgias, joint swelling, myalgias and neck pain. Skin: Negative for color change and rash. Neurological: Negative for weakness and numbness. Psychiatric/Behavioral: Negative for agitation. The patient is not nervous/anxious. Examination:  General Exam:  Vitals: /78   Pulse 94   Resp 13   Ht 5' 3.25\" (1.607 m)   Wt 114 lb (51.7 kg)   LMP 11/09/2011 (Exact Date)   SpO2 96%   BMI 20.03 kg/m²    Physical Exam  Vitals and nursing note reviewed. Constitutional:       Appearance: Normal appearance. HENT:      Head: Normocephalic and atraumatic.    Eyes:      Conjunctiva/sclera: Conjunctivae normal.      Pupils: Pupils are equal, round, and reactive to light. Pulmonary:      Effort: Pulmonary effort is normal.   Musculoskeletal:      Left shoulder: No swelling, deformity, laceration, tenderness or bony tenderness. Normal range of motion. Normal strength. Normal pulse. Right elbow: No swelling, deformity, effusion or lacerations. Normal range of motion. No tenderness. Left elbow: Normal.      Cervical back: Normal range of motion. Comments: Right Upper Extremity:    There is mild global tenderness throughout the shoulder most significant at the posterior lateral aspect and periscapular muscles. There is mildly restricted range of motion of the shoulder with pain at the extremes of forward elevation and abduction. Forward elevation 160 degrees, abduction 140 degrees, internal rotation to L5, external rotation 30 degrees. Strength is 5/5 with rotator cuff testing but there is breakaway due to pain. Positive empty can sign. Negative drop arm sign. Positive Neer sign. Positive Lemus sign. Mild pain at the acromioclavicular joint with crossarm test.  No crepitation. Mild clicking at the shoulder with overhead rotational motion. Skin is intact. Sensation is intact throughout the upper extremity. No instability with passive motion of the shoulder. 2+ radial pulse. No edema. No muscle atrophy. Normal scapular motion. Well-healed scars from previous shoulder arthroscopy   Skin:     General: Skin is warm and dry. Neurological:      Mental Status: She is alert and oriented to person, place, and time.    Psychiatric:         Mood and Affect: Mood normal.         Behavior: Behavior normal.            Diagnostic testing:  X-ray images were reviewed by myself and discussed with the patient:  X-ray 3 views Right Shoulder:       X-rays show no evidence of fracture or degenerative process.  Normal bone    density.  Normal alignment.  No abnormal calcifications or soft tissue    swelling.       Impression: Normal x-ray     Office Procedures:  Orders Placed This Encounter   Procedures   [facility]     Referral Priority:   Routine     Referral Type:   Eval and Treat     Referral Reason:   Specialty Services Required     Requested Specialty:   Physical Therapist     Number of Visits Requested:   1       Assessment and Plan  1. Right shoulder rotator cuff tendinitis    Her exam is consistent with rotator cuff tendinitis and I recommended conservative treatments to address the pain and inflammation. I offered her a steroid injection but she would like to defer injection therapy at this time. I have recommended physical therapy for rehabilitation of the shoulder along with a rotator cuff strengthening. I have sent a referral to physical therapy for this today. Continue with other conservative treatments including massage, stretching, and anti-inflammatory medications. Follow-up in 6 to 8 weeks if symptoms have not improved with therapy.   Otherwise follow-up as needed    Electronically signed by Jazzmine Hoang MD on 6/30/2022 at 5:34 PM

## 2022-06-30 NOTE — PROGRESS NOTES
Patient returns to the office with a new complaint of right shoulder pain. Pt stated that the pain is more on the superoposterior aspect of her shoulder and will radiate down her back and up her neck. Pt does not recall any injuries. Pt describes the pain as sharp and is increased with riding her back or driving. Pt has been massaging the area and taking OTC medications if the pain intensifies.

## 2022-07-03 ASSESSMENT — ENCOUNTER SYMPTOMS
EYE PAIN: 0
CHEST TIGHTNESS: 0
WHEEZING: 0
VOMITING: 0
EYE REDNESS: 0
COLOR CHANGE: 0
SHORTNESS OF BREATH: 0

## 2022-07-07 ENCOUNTER — HOSPITAL ENCOUNTER (OUTPATIENT)
Dept: WOMENS IMAGING | Age: 62
Discharge: HOME | End: 2022-07-07
Payer: COMMERCIAL

## 2022-07-07 DIAGNOSIS — Z12.31 ENCOUNTER FOR SCREENING MAMMOGRAM FOR MALIGNANT NEOPLASM OF BREAST: ICD-10-CM

## 2022-07-07 PROCEDURE — 77063 BREAST TOMOSYNTHESIS BI: CPT

## 2022-07-08 ENCOUNTER — HOSPITAL ENCOUNTER (OUTPATIENT)
Dept: PHYSICAL THERAPY | Age: 62
Setting detail: THERAPIES SERIES
Discharge: HOME | End: 2022-07-08
Payer: COMMERCIAL

## 2022-07-08 PROCEDURE — 97161 PT EVAL LOW COMPLEX 20 MIN: CPT

## 2022-07-08 PROCEDURE — 97110 THERAPEUTIC EXERCISES: CPT

## 2022-07-08 PROCEDURE — 97535 SELF CARE MNGMENT TRAINING: CPT

## 2022-07-08 ASSESSMENT — PAIN SCALES - GENERAL: PAINLEVEL_OUTOF10: 4

## 2022-07-08 ASSESSMENT — PAIN DESCRIPTION - ORIENTATION: ORIENTATION: RIGHT

## 2022-07-08 ASSESSMENT — PAIN DESCRIPTION - FREQUENCY: FREQUENCY: INTERMITTENT

## 2022-07-08 ASSESSMENT — PAIN DESCRIPTION - LOCATION: LOCATION: SHOULDER;NECK;RIB CAGE

## 2022-07-08 ASSESSMENT — PAIN DESCRIPTION - PAIN TYPE: TYPE: CHRONIC PAIN

## 2022-07-08 ASSESSMENT — PAIN DESCRIPTION - DESCRIPTORS: DESCRIPTORS: ACHING;PENETRATING;SHARP

## 2022-07-08 NOTE — PROGRESS NOTES
Physical Therapy: Initial Evaluation    Patient: Lidia Zabala (55 y.o. female)   Examination Date:   Plan of Care Certification Period: 2022 to   22      :  1960 ;    Confirmed: YES MRN: [id number]  CSN: [id number]   Insurance: Payor: Michaele Fabry / Plan: [facility] / Product Type: *No Product type* /   Insurance ID: [id number] - (Commercial) Secondary Insurance (if applicable):    Referring Physician: MD Neelima Denny   PCP: Erika Wiggins MD Visits to Date/Visits Approved:    ([facility])    No Show/Cancelled Appts:   /       Medical Diagnosis: Other shoulder lesions, right shoulder [M75.81] R RC tendinitis  Treatment Diagnosis: myofascial and referred pain from C-spine into shoulder, scapula, thoracic spine     PERTINENT MEDICAL HISTORY   Patient Assessed for Rehabilitation Services: Yes  Self reported health status[de-identified] Good    Medical History: Chart Reviewed: Yes   Past Medical History:   Diagnosis Date    ADD (attention deficit disorder)     Celiac disease     Nausea      Surgical History:   Past Surgical History:   Procedure Laterality Date    COLONOSCOPY      COLONOSCOPY  2018    repeat in 10 years    ENDOSCOPY, COLON, DIAGNOSTIC      PATELLA SURGERY Left     SHOULDER SURGERY Right     Fester    TONSILLECTOMY      WISDOM TOOTH EXTRACTION         Medications:   Current Outpatient Medications:     terbinafine (LAMISIL) 250 MG tablet, Take 1 tablet by mouth daily, Disp: 90 tablet, Rfl: 0    [START ON 2022] atomoxetine (STRATTERA) 60 MG capsule, Take 1 capsule by mouth daily, Disp: 90 capsule, Rfl: 1    valACYclovir (VALTREX) 1 g tablet, 2 TABS BY MOUTH EVERY 12 HOURS HOURS X 1 DAY FOR COLD SORES, Disp: 24 tablet, Rfl: 0    fluticasone (FLONASE) 50 MCG/ACT nasal spray, 2 sprays by Each Nostril route daily, Disp: 3 g, Rfl: 3    Menaquinone-7 (VITAMIN K2 PO), Take 1 tablet by mouth daily, Disp: , Rfl:    thyroid (ARMOUR THYROID) 15 MG tablet, Take 1 tablet by mouth daily, Disp: , Rfl:     RESTASIS 0.05 % ophthalmic emulsion, Apply 1 drop to eye 2 times daily, Disp: , Rfl: 1    Cholecalciferol (VITAMIN D3 PO), Take by mouth, Disp: , Rfl:   Allergies: Gluten meal and Seasonal      SUBJECTIVE EXAMINATION     History obtained from[de-identified] Patient,Chart Review,      Family/Caregiver Present: No    Subjective History: Onset Date:  (chronic x many years)  Subjective: Pt reports chronic right shoulder, scapular and thoracic back pain laterally near ribs over the last 15 years. Has had chiropractics, massage/trigger point therapy, arthroscopic surgery in 2015 d/t labral fraying/decompression of acromion. Pt is right hand dominant. PLOF:  Pt is very independent w/all mobility, active w/bike riding, swimming but has had to modify activities since before and after shoulder surgery 2015.   Additional Pertinent Hx (if applicable):     Previous treatments prior to current episode?: Chiropractor,Massage,Acupuncture  Any changes to allergies, medications, or have you had any medical procedures/ER visits since your last visit?: No      Learning/Language: Learning  Does the patient/guardian have any barriers to learning?: No barriers  Will there be a co-learner?: No  What is the preferred language of the patient/guardian?: English  Is an  required?: No  How does the patient/guardian prefer to learn new concepts?: Listening,Demonstration,Reading     Pain Screening   Pain Screening  Patient Currently in Pain: No  Pain Assessment: 0-10  Pain Level: 4  Best Pain Level: 0  Worst Pain Level: 8  Pain Type: Chronic pain  Pain Location: Shoulder,Neck,Rib cage  Pain Orientation: Right  Pain Radiating Towards: right thoracic/ribs  Pain Descriptors: Aching,Penetrating,Sharp  Pain Frequency: Intermittent  Aggravating factors: Carrying,Lifting,Reaching  Pain Management/Relieving Factors:  (trigger point release)    Functional Status Dominant Hand: : Right    Social History:  Social History  Lives With: Alone  Type of Home: House  Home Layout: One level    Occupation/Interests:   works at a school, enjoys biking, swimming    Prior Level of Function:  Independent w/intermittent R shoulder pain          Current Level of Function:  Independent w/intermittent R shoulder/R neck pain w/lifting, carrying items, sleeping, worsening              OBJECTIVE EXAMINATION   Restrictions:        Position Activity Restriction  Other position/activity restrictions: No formal restrictions    Review of Systems:  Vision: Within Functional Limits  Hearing: Within functional limits  Overall Orientation Status: Within Functional Limits  Patient affect[de-identified] Normal  Follows Commands: Within Functional Limits    Observations:  General Observations  Description: FHP, R shoulder slightly anterior to left    Palpation:   Cervical Spine Palpation: Mild TTP R suboccipital muscles/transverse processes  Right Shoulder Palpation: TTP/TrPs R UT, rhomboids  Right Shoulder Palpation: Mild TTP R teres minor, TTP UT/rhomboids    Ambulation/Gait (if applicable):  Ambulation  Gait Deviations: None    Left AROM  Right AROM      General AROM UE: Left WNL,Right WNL    General AROM UE: Left WNL,Right WNL        Left PROM  Right PROM                    Left Strength  Right Strength      General Strength Testing UE: Right WNL,Left WNL  L UE Strength Comment: 5/5  L UE Strength Comment: 5/5 General Strength Testing UE: Right WNL,Left WNL  R UE Strength Comment: slight decrease in shoulder flex, abd, IR/ER at 4/5  R UE Strength Comment: slight decrease in shoulder flex, abd, IR/ER at 4/5     Cervical Assessment   AROM Cervical Spine   Cervical Spine AROM : WFL         Special Tests:   Special Tests for Cervical Spine  Special Tests: (+) spurlings R and scalene tightness w/reproduced symptoms, relief w/manual axial decompression  Special Tests:  ((-) Heath Sanchez, (-) Neer, (-) empty can, (-) Crossover, (-) speeds, (+) scapular dyskinesia/hypermobility inferior angle)     ASSESSMENT     Impression: Assessment: Pt is a 57 y.o. female w/DX R RC tendinitis. Pt reports chronic right shoulder, scapular and thoracic back pain laterally near ribs over the last 15 years. Has had chiropractics, massage/trigger point therapy, arthroscopic surgery in 2015 d/t labral fraying/decompression of acromion. Pt is right hand dominant. PLOF:  Pt is very independent w/all mobility, active w/bike riding, swimming but has had to modify activities since before and after shoulder surgery 2015. CURRENT LOF:  Continues to be independent and very active in daily life (work and leisure), however w/worsening pain. Body Structures, Functions, Activity Limitations Requiring Skilled Therapeutic Intervention: Decreased functional mobility ,Decreased ADL status,Increased pain,Decreased sensation,Decreased ROM,Decreased posture,Decreased tolerance to work activity,Decreased strength    Statement of Medical Necessity: Physical Therapy is both indicated and medically necessary as outlined in the POC to increase the likelihood of meeting the functionally related goals stated below. Patient's Activity Tolerance: Patient tolerated evaluation without incident      Patient's rehabilitation potential/prognosis is considered to be: Fair,Good    Factors which may impact rehabilitation potential include: None        GOALS   Patient Goal(s): Reduce pain/improve function R shoulder  Long Term Goals Completed by In 4 weeks, patient will Goal Status   demonstrate compliance and independence w/HEP. improve on Quick Dash score. report ability to participate in recreational activities w/o R neck, shoulder, parascapular pain.               TREATMENT PLAN       Requires PT Follow-Up: Yes    Pt. actively involved in establishing Plan of Care and Goals: YES  Patient/ Caregiver education and instruction: Madison Pierre of Care,Evaluative findings,Posture,Anatomy of condition,Disease Specific Education             Treatment may include any combination of the following: Dennis Danes to work related activity,Positioning,Neuromuscular re-education,Manual Therapy - Soft Tissue Mobilization,Home exercise program,Therapeutic activities,Manual Therapy - Joint Manipulation,Patient/Caregiver education & training,Integrated dry needling,Modalities     Frequency / Duration:  Patient to be seen 2x for 4 weeks weeks      Eval Complexity: Overall Evaluation : Low  Decision Making: Low Complexity  History: Personal Factors and/or Comorbidities Impacting POC: Low  History: see above  Examination of body system(s) including body structures and functions, activity limitations, and/or participation restrictions: Low  Exam: see above        Therapist Signature: Costa Lopez, PT    Date: 4/4/5882     I certify that the above Therapy Services are being furnished while the patient is under my care. I agree with the treatment plan and certify that this therapy is necessary. Physician's Signature:  ___________________________   Date:_______                                                                   Devin Huntley MD        Physician Comments: _______________________________________________    Please sign and return to [address]. Please fax to the location listed below.  Aleksandar Nickerson for this referral!    [address] [facility] [address], [address] [address]  Dept: [phone]  Loc: [phone]

## 2022-07-08 NOTE — FLOWSHEET NOTE
Outpatient Physical Therapy  [place]           [x] Phone: [phone]   Fax: [phone]  [place]           [] Phone: [phone]   Fax: [phone]        Physical Therapy Daily Treatment Note  Date:  2022    Patient Name:  Shannon Garcia    :  1960  MRN: [id number]  Restrictions/Precautions: No data recorded   Position Activity Restriction  Other position/activity restrictions: No formal restrictions  Diagnosis:   Other shoulder lesions, right shoulder [M75.81] Diagnosis: R RC tendinitis  Date of Injury/Surgery:   Treatment Diagnosis:  myofascial and referred pain from C-spine into shoulder, scapula, thoracic spine  Insurance/Certification information: [facility]  Referring Physician:  Elie Gutierrez MD   PCP: Chey Stanley MD  Next Doctor Visit:  Unknown  Plan of care signed (Y/N):  Pending  Outcome Measure:   Eval: Zoe Barrett (needs re-done/scoring not valid)  Visit# / total visits:  1 /  Pain level: 4/10   Goals:     Patient goals: Reduce pain/improve function R shoulder     Long Term Goals  Time Frame for Long Term Goals: In 4 weeks, patient will  demonstrate compliance and independence w/HEP. improve on Quick Dash score. report ability to participate in recreational activities w/o R neck, shoulder, parascapular pain. Summary of Evaluation:  Assessment: Pt is a 57 y.o. female w/DX R RC tendinitis. Pt reports chronic right shoulder, scapular and thoracic back pain laterally near ribs over the last 15 years. Has had chiropractics, massage/trigger point therapy, arthroscopic surgery in 2015 d/t labral fraying/decompression of acromion. Pt is right hand dominant. PLOF:  Pt is very independent w/all mobility, active w/bike riding, swimming but has had to modify activities since before and after shoulder surgery . CURRENT LOF:  Continues to be independent and very active in daily life (work and leisure), however w/worsening pain.         Subjective:  See eval         Any changes in Ambulatory Summary Sheet? None        Objective:  See eval   COVID screening questions were asked and patient attested that there had been no contact or symptoms        Exercises: (No more than 4 columns)   Exercise/Equipment Date 7/08/22 #1 Date Date           WARM UP                     TABLE      Chin tucks reviewed     Scapular retraction reviewed                          STANDING                                                     PROPRIOCEPTION                                    MODALITIES                      Other Therapeutic Activities/Education:    POC and tx rationale/progression expected reviewed w/patient. Home Exercise Program:    Chin tucks, scapular retraction discussed. Provide HO's next visit and progress HEP      Manual Treatments:  NO      Modalities:  NO      Communication with other providers:  POC faxed 7/08/22      Assessment:  (Response towards treatment session) (Pain Rating)  Assessment: Pt is a 57 y.o. female w/DX R RC tendinitis. Pt reports chronic right shoulder, scapular and thoracic back pain laterally near ribs over the last 15 years. Has had chiropractics, massage/trigger point therapy, arthroscopic surgery in 2015 d/t labral fraying/decompression of acromion. Pt is right hand dominant. PLOF:  Pt is very independent w/all mobility, active w/bike riding, swimming but has had to modify activities since before and after shoulder surgery 2015. CURRENT LOF:  Continues to be independent and very active in daily life (work and leisure), however w/worsening pain.       Plan for Next Session: postural re-training/exercise/strengthening, scapular stabilization (R side focus), cervical tx 15# static 10 min prn, neutral position, modalities prn       Time In / Time Out:    1000/1050       If BWC Please Indicate Time In/Out/Total Time  CPT Code Time in Time out Total Time                                                            Total for session             Timed Code/Total Treatment Minutes:  30'/50'  TE 13' (1), ADL 13' (1), Eval 20' (1)      Next Progress Note due:  8/08/22      Plan of Care Interventions:  [x] Therapeutic Exercise  [x] Modalities:  [x] Therapeutic Activity     [x] Ultrasound  [x] Estim  [] Gait Training      [x] Cervical Traction [] Lumbar Traction  [x] Neuromuscular Re-education    [x] Cold/hotpack [] Iontophoresis   [x] Instruction in HEP      [] Vasopneumatic   [x] Dry Needling    [x] Manual Therapy               [] Aquatic Therapy              Electronically signed by:  Pawel Milner PT, 7/8/2022, 5:50 PM

## 2022-07-12 NOTE — DISCHARGE SUMMARY
Outpatient Physical Therapy           [place]           [] Phone: [phone]   Fax: [phone]  [place]           [] Phone: [phone]   Fax: [phone]     To: Shwetha Rodriguez MD     From: Rocky Chambers PT, PT     Patient: Shelbie Vickers       : 1960  Diagnosis: Sacrococcygeal disorders, not elsewhere classified [M53.3]    Treatment Diagnosis:    Date: 2022      Physical Therapy Discharge Form  Dear Dr. Arleth Ward,      The following patient has not returned to physical therapy in > 30 days since the start of therapy and will be formally discharged at this time. If you have any questions or concerns please contact our office. Electronically signed by:  Rocky Chambers PT, DPT, [place] 2022, 2:04 PM        If you have any questions or concerns, please don't hesitate to call.   Thank you for your referral.      Physician Signature:________________________________Date:_________ TIME: _____  By signing above, therapists plan is approved by physician

## 2022-07-15 ENCOUNTER — HOSPITAL ENCOUNTER (OUTPATIENT)
Dept: ULTRASOUND IMAGING | Age: 62
End: 2022-07-15
Payer: COMMERCIAL

## 2022-07-15 ENCOUNTER — HOSPITAL ENCOUNTER (OUTPATIENT)
Dept: WOMENS IMAGING | Age: 62
Discharge: HOME | End: 2022-07-15
Payer: COMMERCIAL

## 2022-07-15 ENCOUNTER — HOSPITAL ENCOUNTER (OUTPATIENT)
Dept: PHYSICAL THERAPY | Age: 62
Setting detail: THERAPIES SERIES
Discharge: HOME | End: 2022-07-15
Payer: COMMERCIAL

## 2022-07-15 DIAGNOSIS — R92.8 ABNORMAL MAMMOGRAM: ICD-10-CM

## 2022-07-15 PROCEDURE — 97110 THERAPEUTIC EXERCISES: CPT

## 2022-07-15 PROCEDURE — 97012 MECHANICAL TRACTION THERAPY: CPT

## 2022-07-15 PROCEDURE — G0279 TOMOSYNTHESIS, MAMMO: HCPCS

## 2022-07-15 NOTE — FLOWSHEET NOTE
Outpatient Physical Therapy  [place]           [x] Phone: [phone]   Fax: [phone]  [place] [facility]           [] Phone: [phone]   Fax: [phone]        Physical Therapy Daily Treatment Note  Date:  7/15/2022    Patient Name:  Ana Dubon    :  1960  MRN: [id number]  Restrictions/Precautions: No data recorded   Position Activity Restriction  Other position/activity restrictions: No formal restrictions  Diagnosis:   Other shoulder lesions, right shoulder [M75.81] Diagnosis: R RC tendinitis  Date of Injury/Surgery:   Treatment Diagnosis:  myofascial and referred pain from C-spine into shoulder, scapula, thoracic spine  Insurance/Certification information: [facility]  Referring Physician:  Renée Ball MD   PCP: Nolberto Rubio MD  Next Doctor Visit:  Unknown  Plan of care signed (Y/N):  Pending  Outcome Measure:   Eval: Suzettelucas Horowitz (needs re-done/scoring not valid)  Visit# / total visits:  2/  Pain level: 6/10   Goals:     Patient goals: Reduce pain/improve function R shoulder     Long Term Goals  Time Frame for Long Term Goals: In 4 weeks, patient will  demonstrate compliance and independence w/HEP. improve on Quick Dash score. report ability to participate in recreational activities w/o R neck, shoulder, parascapular pain. Summary of Evaluation:  Assessment: Pt is a 57 y.o. female w/DX R RC tendinitis. Pt reports chronic right shoulder, scapular and thoracic back pain laterally near ribs over the last 15 years. Has had chiropractics, massage/trigger point therapy, arthroscopic surgery in 2015 d/t labral fraying/decompression of acromion. Pt is right hand dominant. PLOF:  Pt is very independent w/all mobility, active w/bike riding, swimming but has had to modify activities since before and after shoulder surgery . CURRENT LOF:  Continues to be independent and very active in daily life (work and leisure), however w/worsening pain.         Subjective:  Pt continues to work on her shoulder trigger points, especially before exercise, like biking. Any changes in Ambulatory Summary Sheet? None        Objective:    COVID screening questions were asked and patient attested that there had been no contact or symptoms    Mild scapular winging on the right. Slight FHP. TrPs R UT, scapulothoracic soft tissue        Exercises: (No more than 4 columns)   Exercise/Equipment Date 7/08/22 #1 Date 7/15/22 #2 Date           WARM UP                     TABLE      *Chin tucks reviewed     *Scapular retraction reviewed     *Scapular protraction  2 x 10  On SB  RTB  (For serratus anterior activation)      *Shoulder horizontal abd  2 x 10  On SB  RTB  (For mid-trap activation)    *Shoulder flex to 90 deg  2 x 10  On SB  2# hand wts  (for lower trap activation)    *Rhomboids  Next visit          STANDING                                                     PROPRIOCEPTION                                    MODALITIES  Cervical tx                    Other Therapeutic Activities/Education:    POC and tx rationale/progression expected reviewed w/patient. 7/15/22:  Reviewed rationale, benefits and possible side effects of cervical traction. Pt wished to proceed. Home Exercise Program:    Chin tucks, scapular retraction discussed. Provide HO's next visit and progress HEP      Manual Treatments:  NO      Modalities:    Cervical tx  15# x 6 min  13# x 9 min  Slight flex  Static       Communication with other providers:  POC faxed 7/08/22      Assessment:  (Response towards treatment session) (Pain Rating)    Pt tolerated tx very well. Reported feeling of \"pressure\" at occiput during cervical traction. No other reported difficulties and even had decreased pain of 3/10 (was 6/10 on arrival.)  Will proceed w/scapular stabilization, RC/upper body strengthening, postural retraining, modalities prn. Good rehab potential.      Assessment: Pt is a 57 y.o. female w/DX R RC tendinitis.   Pt reports chronic right shoulder, scapular and thoracic back pain laterally near ribs over the last 15 years. Has had chiropractics, massage/trigger point therapy, arthroscopic surgery in 2015 d/t labral fraying/decompression of acromion. Pt is right hand dominant. PLOF:  Pt is very independent w/all mobility, active w/bike riding, swimming but has had to modify activities since before and after shoulder surgery 2015. CURRENT LOF:  Continues to be independent and very active in daily life (work and leisure), however w/worsening pain.       Plan for Next Session: postural re-training/exercise/strengthening, scapular stabilization (R side focus), cervical tx 13-15# as lenard, static 15min prn, slight flexion position, modalities prn       Time In / Time Out:    1055/1140       If BWC Please Indicate Time In/Out/Total Time  CPT Code Time in Time out Total Time                                                            Total for session             Timed Code/Total Treatment Minutes:  30'/45'  TE 30' (2), TX 15' (1)    Next Progress Note due:  8/08/22      Plan of Care Interventions:  [x] Therapeutic Exercise  [x] Modalities:  [x] Therapeutic Activity     [x] Ultrasound  [x] Estim  [] Gait Training      [x] Cervical Traction [] Lumbar Traction  [x] Neuromuscular Re-education    [x] Cold/hotpack [] Iontophoresis   [x] Instruction in HEP      [] Vasopneumatic   [x] Dry Needling    [x] Manual Therapy               [] Aquatic Therapy              Electronically signed by:  Blanca Lincoln PT, 7/15/2022, 10:58 AM

## 2022-07-18 ENCOUNTER — HOSPITAL ENCOUNTER (OUTPATIENT)
Dept: PHYSICAL THERAPY | Age: 62
Setting detail: THERAPIES SERIES
Discharge: HOME | End: 2022-07-18
Payer: COMMERCIAL

## 2022-07-18 PROCEDURE — 97012 MECHANICAL TRACTION THERAPY: CPT

## 2022-07-18 PROCEDURE — 97110 THERAPEUTIC EXERCISES: CPT

## 2022-07-18 NOTE — FLOWSHEET NOTE
Outpatient Physical Therapy  [place]           [x] Phone: [phone]   Fax: [phone]  Alyssa Kelley           [] Phone: [phone]   Fax: [phone]        Physical Therapy Daily Treatment Note  Date:  2022    Patient Name:  Lidia Zabala    :  1960  MRN: [id number]  Restrictions/Precautions: No data recorded   Position Activity Restriction  Other position/activity restrictions: No formal restrictions  Diagnosis:   Other shoulder lesions, right shoulder [M75.81] Diagnosis: R RC tendinitis  Date of Injury/Surgery:   Treatment Diagnosis:  myofascial and referred pain from C-spine into shoulder, scapula, thoracic spine  Insurance/Certification information: [facility]  Referring Physician:  Neelima Godoy MD   PCP: Erika Wiggins MD  Next Doctor Visit:  Unknown  Plan of care signed (Y/N):  Pending  Outcome Measure:   Eval: Garcia Casas (needs re-done/scoring not valid)  Visit# / total visits:  3  Pain level: 5/10, trigger points 3-8/10 during the day   Goals:     Patient goals: Reduce pain/improve function R shoulder     Long Term Goals  Time Frame for Long Term Goals: In 4 weeks, patient will  demonstrate compliance and independence w/HEP. improve on Quick Dash score. report ability to participate in recreational activities w/o R neck, shoulder, parascapular pain. Summary of Evaluation:  Assessment: Pt is a 57 y.o. female w/DX R RC tendinitis. Pt reports chronic right shoulder, scapular and thoracic back pain laterally near ribs over the last 15 years. Has had chiropractics, massage/trigger point therapy, arthroscopic surgery in 2015 d/t labral fraying/decompression of acromion. Pt is right hand dominant. PLOF:  Pt is very independent w/all mobility, active w/bike riding, swimming but has had to modify activities since before and after shoulder surgery .   CURRENT LOF:  Continues to be independent and very active in daily life (work and leisure), however w/worsening pain.        Subjective:  Pt states the pain from her shoulder trigger points ranges 3 to 8/10 during the day. Mornings are usually better then gets worse throughout the day. Pt arrived at wrong time today 10:00 for 1:00 appt. Any changes in Ambulatory Summary Sheet? None        Objective:    COVID screening questions were asked and patient attested that there had been no contact or symptoms    Mild scapular winging on the right. Slight FHP. TrPs R UT, scapulothoracic soft tissue        Exercises: (No more than 4 columns)   Exercise/Equipment Date 7/08/22 #1 Date 7/15/22 #2 Date 7/18/22 #3           WARM UP                     TABLE      *Chin tucks reviewed     *Scapular retraction reviewed     *Scapular protraction  2 x 10  On SB  RTB  (For serratus anterior activation)   2 x 10  On SB  RTB  (For serratus anterior activation)   *Shoulder horizontal abd  2 x 10  On SB  RTB  (For mid-trap activation) 2 x 10  On SB  RTB  (For mid-trap activation)   *Shoulder flex to 90 deg  2 x 10  On SB  2# hand wts  (for lower trap activation) 2 x 10  On SB  2# hand wts  (for lower trap activation)   *Rhomboids  Next visit Rows Elbows at side and up at 90 deg RTB 2x10 ea         STANDING                                                     PROPRIOCEPTION                                    MODALITIES  Cervical tx See below                   Other Therapeutic Activities/Education:    POC and tx rationale/progression expected reviewed w/patient. 7/15/22:  Reviewed rationale, benefits and possible side effects of cervical traction. Pt wished to proceed. Home Exercise Program:    Chin tucks, scapular retraction discussed.   Provide HO's next visit and progress HEP      Manual Treatments:  NO      Modalities:    Cervical tx  14# x 15 min  Slight flex  Static       Communication with other providers:  POC faxed 7/08/22      Assessment:  (Response towards treatment session) (Pain Rating) Pt demonstrated overall good tolerance to today's session without irritating pain symptoms. Comfortable with traction today. Will proceed w/scapular stabilization, RC/upper body strengthening, postural retraining, modalities prn. Good rehab potential.  End pain: reports of mild discomfort in her right rhomboid, other than that no pain. Assessment: Pt is a 57 y.o. female w/DX R RC tendinitis. Pt reports chronic right shoulder, scapular and thoracic back pain laterally near ribs over the last 15 years. Has had chiropractics, massage/trigger point therapy, arthroscopic surgery in 2015 d/t labral fraying/decompression of acromion. Pt is right hand dominant. PLOF:  Pt is very independent w/all mobility, active w/bike riding, swimming but has had to modify activities since before and after shoulder surgery 2015. CURRENT LOF:  Continues to be independent and very active in daily life (work and leisure), however w/worsening pain.       Plan for Next Session: postural re-training/exercise/strengthening, scapular stabilization (R side focus), cervical tx 13-15# as lenard, static 15min prn, slight flexion position, modalities prn       Time In / Time Out:    1010/1055       If BW Please Indicate Time In/Out/Total Time  CPT Code Time in Time out Total Time                                                            Total for session             Timed Code/Total Treatment Minutes:  30'/45'  TE (2), [address] (1)    Next Progress Note due:  8/08/22      Plan of Care Interventions:  [x] Therapeutic Exercise  [x] Modalities:  [x] Therapeutic Activity     [x] Ultrasound  [x] Estim  [] Gait Training      [x] Cervical Traction [] Lumbar Traction  [x] Neuromuscular Re-education    [x] Cold/hotpack [] Iontophoresis   [x] Instruction in HEP      [] Vasopneumatic   [x] Dry Needling    [x] Manual Therapy               [] Aquatic Therapy              Electronically signed by:  Kyle Carlson PTA7/18/2022, 10:09 AM

## 2022-07-20 ENCOUNTER — NURSE ONLY (OUTPATIENT)
Dept: FAMILY MEDICINE CLINIC | Age: 62
End: 2022-07-20
Payer: COMMERCIAL

## 2022-07-20 DIAGNOSIS — B35.1 ONYCHOMYCOSIS: Primary | ICD-10-CM

## 2022-07-20 PROCEDURE — 99211 OFF/OP EST MAY X REQ PHY/QHP: CPT | Performed by: FAMILY MEDICINE

## 2022-07-20 NOTE — PROGRESS NOTES
Patient was roomed in room 1. Patient brought her own toe nail clippers. Patient clipped left great toe nail. Clippings put in a sterile container.  Toe nail clippings sent to [address] [place] [address] for culture

## 2022-07-21 ENCOUNTER — HOSPITAL ENCOUNTER (OUTPATIENT)
Dept: PHYSICAL THERAPY | Age: 62
Setting detail: THERAPIES SERIES
Discharge: HOME | End: 2022-07-21
Payer: COMMERCIAL

## 2022-07-21 PROCEDURE — 97012 MECHANICAL TRACTION THERAPY: CPT

## 2022-07-21 NOTE — FLOWSHEET NOTE
Outpatient Physical Therapy  [place]           [x] Phone: [phone]   Fax: [phone]  Elyssa obregon           [] Phone: [phone]   Fax: [phone]        Physical Therapy Daily Treatment Note  Date:  2022    Patient Name:  Gabino Smith    :  1960  MRN: [id number]  Restrictions/Precautions: No data recorded   Position Activity Restriction  Other position/activity restrictions: No formal restrictions  Diagnosis:   Other shoulder lesions, right shoulder [M75.81] Diagnosis: R RC tendinitis  Date of Injury/Surgery:   Treatment Diagnosis:  myofascial and referred pain from C-spine into shoulder, scapula, thoracic spine  Insurance/Certification information: [facility]  Referring Physician:  Federico Ly MD   PCP: Desmond Roger MD  Next Doctor Visit:  Unknown  Plan of care signed (Y/N):  Pending  Outcome Measure:   Eval: Toogoldy Ortegaon (needs re-done/scoring not valid)  Visit# / total visits:  4  Pain level: 6/10, trigger points 3-8/10 during the day   Goals:     Patient goals: Reduce pain/improve function R shoulder     Long Term Goals  Time Frame for Long Term Goals: In 4 weeks, patient will  demonstrate compliance and independence w/HEP. improve on Quick Dash score. report ability to participate in recreational activities w/o R neck, shoulder, parascapular pain. Summary of Evaluation:  Assessment: Pt is a 57 y.o. female w/DX R RC tendinitis. Pt reports chronic right shoulder, scapular and thoracic back pain laterally near ribs over the last 15 years. Has had chiropractics, massage/trigger point therapy, arthroscopic surgery in 2015 d/t labral fraying/decompression of acromion. Pt is right hand dominant. PLOF:  Pt is very independent w/all mobility, active w/bike riding, swimming but has had to modify activities since before and after shoulder surgery .   CURRENT LOF:  Continues to be independent and very active in daily life (work and leisure), however w/worsening pain.        Subjective:  Patient states that her pain is about a 6/10. Patient says she did her exercises at home this morning  Any changes in Ambulatory Summary Sheet? None        Objective:    COVID screening questions were asked and patient attested that there had been no contact or symptoms    Patient only came in for traction, patient did exercises at home. Exercises: (No more than 4 columns)   Exercise/Equipment Date 7/15/22 #2 Date 7/18/22 #3 7/21/22 #4           WARM UP                     TABLE      *Chin tucks      *Scapular retraction      *Scapular protraction 2 x 10  On SB  RTB  (For serratus anterior activation)   2 x 10  On SB  RTB  (For serratus anterior activation)    *Shoulder horizontal abd 2 x 10  On SB  RTB  (For mid-trap activation) 2 x 10  On SB  RTB  (For mid-trap activation)    *Shoulder flex to 90 deg 2 x 10  On SB  2# hand wts  (for lower trap activation) 2 x 10  On SB  2# hand wts  (for lower trap activation)    *Rhomboids Next visit Rows Elbows at side and up at 90 deg RTB 2x10 ea          STANDING                                                     PROPRIOCEPTION                                    MODALITIES Cervical tx See below                    Other Therapeutic Activities/Education:    POC and tx rationale/progression expected reviewed w/patient. 7/15/22:  Reviewed rationale, benefits and possible side effects of cervical traction. Pt wished to proceed. Home Exercise Program:    Chin tucks, scapular retraction discussed. Provide HO's next visit and progress HEP      Manual Treatments:  NO      Modalities:    Cervical tx  14# x 15 min  Slight flex  Static       Communication with other providers:  POC faxed 7/08/22      Assessment: Patient will continue to benefit from skilled therapy to return to PLOF. Patient inquired about a Physical Therapist to do dry needling in a future session.  Patient continues cervical traction, patient notes immediate relief for pain.    End pain: 4/10      Plan for Next Session: postural re-training/exercise/strengthening, scapular stabilization (R side focus), cervical tx 13-15# as lenard, static 15min prn, slight flexion position, modalities prn       Time In / Time Out:    1112/1143       If BWC Please Indicate Time In/Out/Total Time  CPT Code Time in Time out Total Time                                                            Total for session             Timed Code/Total Treatment Minutes:  31' traction    Next Progress Note due:  8/08/22      Plan of Care Interventions:  [x] Therapeutic Exercise  [x] Modalities:  [x] Therapeutic Activity     [x] Ultrasound  [x] Estim  [] Gait Training      [x] Cervical Traction [] Lumbar Traction  [x] Neuromuscular Re-education    [x] Cold/hotpack [] Iontophoresis   [x] Instruction in HEP      [] Vasopneumatic   [x] Dry Needling    [x] Manual Therapy               [] Aquatic Therapy              Electronically signed by:  Leonie Pierre, PTA  7/21/2022, 8:43 AM

## 2022-07-25 ENCOUNTER — HOSPITAL ENCOUNTER (OUTPATIENT)
Dept: PHYSICAL THERAPY | Age: 62
Setting detail: THERAPIES SERIES
Discharge: HOME | End: 2022-07-25
Payer: COMMERCIAL

## 2022-07-25 PROCEDURE — 97012 MECHANICAL TRACTION THERAPY: CPT

## 2022-07-25 PROCEDURE — 97110 THERAPEUTIC EXERCISES: CPT

## 2022-07-25 NOTE — FLOWSHEET NOTE
Outpatient Physical Therapy  [place]           [x] Phone: [phone]   Fax: [phone]  Adolfo Man           [] Phone: [phone]   Fax: [phone]        Physical Therapy Daily Treatment Note  Date:  2022    Patient Name:  Grace Cortez    :  1960  MRN: [id number]  Restrictions/Precautions: No data recorded   Position Activity Restriction  Other position/activity restrictions: No formal restrictions  Diagnosis:   Other shoulder lesions, right shoulder [M75.81] Diagnosis: R RC tendinitis  Date of Injury/Surgery:   Treatment Diagnosis:  myofascial and referred pain from C-spine into shoulder, scapula, thoracic spine  Insurance/Certification information: [facility]  Referring Physician:  Brenden Rubio MD   PCP: Ifeoma Gomez MD  Next Doctor Visit:  Unknown  Plan of care signed (Y/N):  Pending  Outcome Measure:   Eval: Nando Gagnon (needs re-done/scoring not valid)  Visit# / total visits:  5  Pain level: 0/10, trigger points 1-2/10 during the day   Goals:     Patient goals: Reduce pain/improve function R shoulder     Long Term Goals  Time Frame for Long Term Goals: In 4 weeks, patient will  demonstrate compliance and independence w/HEP. improve on Quick Dash score. report ability to participate in recreational activities w/o R neck, shoulder, parascapular pain. Summary of Evaluation:  Assessment: Pt is a 57 y.o. female w/DX R RC tendinitis. Pt reports chronic right shoulder, scapular and thoracic back pain laterally near ribs over the last 15 years. Has had chiropractics, massage/trigger point therapy, arthroscopic surgery in 2015 d/t labral fraying/decompression of acromion. Pt is right hand dominant. PLOF:  Pt is very independent w/all mobility, active w/bike riding, swimming but has had to modify activities since before and after shoulder surgery .   CURRENT LOF:  Continues to be independent and very active in daily life (work and leisure), however w/worsening pain.        Subjective:  Patient states that she doesn't have any pain today. Patient states the past couple of times she went bicycling she was in no pain, but did notice some tightness. Any changes in Ambulatory Summary Sheet? None        Objective:    COVID screening questions were asked and patient attested that there had been no contact or symptoms      VC to keep elbows straight during horizontal ABD      Exercises: (No more than 4 columns)   Exercise/Equipment Date 7/18/22 #3 7/21/22 #4 7/25/22 #5           WARM UP                     TABLE      *Chin tucks      *Scapular retraction      *Scapular protraction 2 x 10  On SB  RTB  (For serratus anterior activation)  2 x 10  RTB  (For serratus anterior activation)   *Shoulder horizontal abd 2 x 10  On SB  RTB  (For mid-trap activation)  2 x 10  RTB  (For mid-trap activation)   *Shoulder flex to 90 deg 2 x 10  On SB  2# hand wts  (for lower trap activation)  2 x 10  2# hand wts  (for lower trap activation)   *Rhomboids Rows Elbows at side and up at 90 deg RTB 2x10 ea  Rows Elbows at side and up at 90 deg RTB 2x10 ea         STANDING                                                     PROPRIOCEPTION                                    MODALITIES See below  traction                   Other Therapeutic Activities/Education:    POC and tx rationale/progression expected reviewed w/patient. 7/15/22:  Reviewed rationale, benefits and possible side effects of cervical traction. Pt wished to proceed. Home Exercise Program:    Chin tucks, scapular retraction discussed. Provide HO's next visit and progress HEP    Access Code: [email or web address]  URL: [email or web address]. [email or web address]/  Date: 07/25/2022  Prepared by: Criselda Liner    Exercises    Prone Chest Stretch on Chair - 1 x daily - 7 x weekly - 1 sets - 3 reps - 30 seconds hold  Standing Lower Cervical and Upper Thoracic Stretch - 1 x daily - 7 x weekly - 1 sets - 3 reps - 30 seconds hold  Seated Upper Thoracic Stretch - 1 x daily - 7 x weekly - 2 sets - 10 reps  Seated Upper Trapezius Stretch - 1 x daily - 7 x weekly - 1 sets - 3 reps - 20 seconds hold  Seated Levator Scapulae Stretch - 1 x daily - 7 x weekly - 1 sets - 3 reps - 20 seconds hold  Doorway Pec Stretch at 90 Degrees Abduction - 1 x daily - 7 x weekly - 1 sets - 3 reps - 30 seconds hold      Manual Treatments:  NO      Modalities:    Cervical tx  14# x 15 min  Slight flex  Static       Communication with other providers:  POC faxed 7/08/22      Assessment: Patient tolerated today's session with no adverse reactions or complications. Knowledge was given by PTA on how to progress her exercises at home by increasing resistance and repetitions. Patient benefits from cervical traction and notes a slight relief after treatment. Patient will continue to benefit from skilled therapy to return to PLOF.     *GIVE HEP HANDOUT NEXT SESSION     End pain: same      Plan for Next Session: postural re-training/exercise/strengthening, scapular stabilization (R side focus), cervical tx 13-15# as lenard, static 15min prn, slight flexion position, modalities prn       Time In / Time Out:    0930/1004      Timed Code/Total Treatment Minutes:   19'/34' 1 traction (15') 1 TE    Next Progress Note due:  8/08/22      Plan of Care Interventions:  [x] Therapeutic Exercise  [x] Modalities:  [x] Therapeutic Activity     [x] Ultrasound  [x] Estim  [] Gait Training      [x] Cervical Traction [] Lumbar Traction  [x] Neuromuscular Re-education    [x] Cold/hotpack [] Iontophoresis   [x] Instruction in HEP      [] Vasopneumatic   [x] Dry Needling    [x] Manual Therapy               [] Aquatic Therapy              Electronically signed by:  Tamera Turpin, MYNOR  7/25/2022, 8:30 AM

## 2022-08-02 ENCOUNTER — HOSPITAL ENCOUNTER (OUTPATIENT)
Dept: PHYSICAL THERAPY | Age: 62
Setting detail: THERAPIES SERIES
Discharge: HOME | End: 2022-08-02
Payer: COMMERCIAL

## 2022-08-02 PROCEDURE — 20561 NDL INSJ W/O NJX 3+ MUSC: CPT

## 2022-08-02 PROCEDURE — 97110 THERAPEUTIC EXERCISES: CPT

## 2022-08-02 NOTE — FLOWSHEET NOTE
Outpatient Physical Therapy  [place]           [x] Phone: [phone]   Fax: [phone]  Shannan Mcbride           [] Phone: [phone]   Fax: [phone]        Physical Therapy Daily Treatment Note  Date:  2022    Patient Name:  Phylicia Bean    :  1960  MRN: [id number]  Restrictions/Precautions: No data recorded   Position Activity Restriction  Other position/activity restrictions: No formal restrictions  Diagnosis:   Other shoulder lesions, right shoulder [M75.81] Diagnosis: R RC tendinitis  Date of Injury/Surgery:   Treatment Diagnosis:  myofascial and referred pain from C-spine into shoulder, scapula, thoracic spine  Insurance/Certification information: [facility]  Referring Physician:  Devin Huntley MD   PCP: Gabi Sarabia MD  Next Doctor Visit:  Unknown  Plan of care signed (Y/N):  Pending  Outcome Measure:   Eval: Kirke Jeans (needs re-done/scoring not valid)  Visit# / total visits:  6/  Pain level: 0/10, trigger points 1-2/10 during the day       Goals:     Patient goals: Reduce pain/improve function R shoulder     Long Term Goals  Time Frame for Long Term Goals: In 4 weeks, patient will  demonstrate compliance and independence w/HEP. improve on Quick Dash score. report ability to participate in recreational activities w/o R neck, shoulder, parascapular pain. Summary of Evaluation:  Assessment: Pt is a 57 y.o. female w/DX R RC tendinitis. Pt reports chronic right shoulder, scapular and thoracic back pain laterally near ribs over the last 15 years. Has had chiropractics, massage/trigger point therapy, arthroscopic surgery in 2015 d/t labral fraying/decompression of acromion. Pt is right hand dominant. PLOF:  Pt is very independent w/all mobility, active w/bike riding, swimming but has had to modify activities since before and after shoulder surgery .   CURRENT LOF:  Continues to be independent and very active in daily life (work and leisure), however w/worsening pain.        Subjective:  Shan arrives to therapy stating that she has to leave by  today for another doctor's appointment. She reports that her neck remains about the same overall. Some days it is more painful than others. Trying to find a solution for working at her computer, doesn't know how to type so has to look down at the keys and that hurts her neck. Any changes in Ambulatory Summary Sheet? None        Objective:  COVID screening questions were asked and patient attested that there had been no contact or symptoms    Review of her new HEP (stretches). Exercises: (No more than 4 columns)   Exercise/Equipment 7/21/22 #4 7/25/22 #5 8/2/2022 #6           WARM UP                     TABLE      *Chin tucks      *Scapular retraction      *Scapular protraction  2 x 10  RTB  (For serratus anterior activation) -   *Shoulder horizontal abd  2 x 10  RTB  (For mid-trap activation) -   *Shoulder flex to 90 deg  2 x 10  2# hand wts  (for lower trap activation) -   *Rhomboids  Rows Elbows at side and up at 90 deg RTB 2x10 ea -      -   STANDING                                                     PROPRIOCEPTION                                    MODALITIES  traction                    Other Therapeutic Activities/Education:    POC and tx rationale/progression expected reviewed w/patient. 7/15/22:  Reviewed rationale, benefits and possible side effects of cervical traction. Pt wished to proceed. 8/2/22: Reviewed new HEP including new stretches. Discussed various ways to address stretches. Performed home education on appropriate posture during ADL's and working at her computer. Home Exercise Program:    Chin tucks, scapular retraction discussed. Provide HO's next visit and progress HEP    Access Code: [email or web address]  URL: [employer]. [email or web address]  Date: 07/25/2022  Prepared by: Del Collar    Exercises    Prone Chest Stretch on Chair - 1 x daily - 7 x weekly - 1 sets - 3 reps - 30 seconds hold  Standing Lower Cervical and Upper Thoracic Stretch - 1 x daily - 7 x weekly - 1 sets - 3 reps - 30 seconds hold  Seated Upper Thoracic Stretch - 1 x daily - 7 x weekly - 2 sets - 10 reps  Seated Upper Trapezius Stretch - 1 x daily - 7 x weekly - 1 sets - 3 reps - 20 seconds hold  Seated Levator Scapulae Stretch - 1 x daily - 7 x weekly - 1 sets - 3 reps - 20 seconds hold  Doorway Pec Stretch at 90 Degrees Abduction - 1 x daily - 7 x weekly - 1 sets - 3 reps - 30 seconds hold      Manual Treatments:  DNT consent obtained;  4 15mm needles total to B upper/mid cervical paraspinals  1 30mm needle each to R upper trap and infraspinatus. Modalities:        Communication with other providers:  POC faxed 7/08/22      Assessment: Asuncion Lea continues to demonstrate tightness in several thoracic and cervical muscles. Relates some tenderness and tightness at her R upper trap after DNT. She does relate symptoms did improve with the couple of traction sessions.           End pain: same      Plan for Next Session: postural re-training/exercise/strengthening, scapular stabilization (R side focus), cervical tx 13-15# as lenard, static 15min prn, slight flexion position, modalities prn       Time In / Time Out:    1400/     (1400/1415 with Anny Tolentino PTA 15' 1 TE) 1415/1430 Elena Villagomez PT      Timed Code/Total Treatment Minutes:   15mins    Next Progress Note due:  8/08/22      Plan of Care Interventions:  [x] Therapeutic Exercise  [x] Modalities:  [x] Therapeutic Activity     [x] Ultrasound  [x] Estim  [] Gait Training      [x] Cervical Traction [] Lumbar Traction  [x] Neuromuscular Re-education    [x] Cold/hotpack [] Iontophoresis   [x] Instruction in HEP      [] Vasopneumatic   [x] Dry Needling    [x] Manual Therapy               [] Aquatic Therapy              Electronically signed by:  Anny Tolentino PTA      8/2/2022, 9:15 AM

## 2022-08-05 ENCOUNTER — HOSPITAL ENCOUNTER (OUTPATIENT)
Dept: PHYSICAL THERAPY | Age: 62
Setting detail: THERAPIES SERIES
Discharge: HOME | End: 2022-08-05
Payer: COMMERCIAL

## 2022-08-05 PROCEDURE — 97012 MECHANICAL TRACTION THERAPY: CPT

## 2022-08-05 PROCEDURE — 97535 SELF CARE MNGMENT TRAINING: CPT

## 2022-08-05 NOTE — FLOWSHEET NOTE
Outpatient Physical Therapy  [place]           [x] Phone: [phone]   Fax: [phone]  Geroge Mcburney           [] Phone: [phone]   Fax: [phone]        Physical Therapy Daily Treatment Note  Date:  2022    Patient Name:  Verónica Franco    :  1960  MRN: [id number]  Restrictions/Precautions: No data recorded   Position Activity Restriction  Other position/activity restrictions: No formal restrictions  Diagnosis:   Other shoulder lesions, right shoulder [M75.81] Diagnosis: R RC tendinitis  Date of Injury/Surgery:   Treatment Diagnosis:  myofascial and referred pain from C-spine into shoulder, scapula, thoracic spine  Insurance/Certification information: [facility]  Referring Physician:  Chantelle Ray MD   PCP: Gilbert Segura MD  Next Doctor Visit:  Unknown  Plan of care signed (Y/N):  Pending  Outcome Measure:   Eval: Arelis Ling (needs re-done/scoring not valid)  Visit# / total visits:  7/  Pain level: 3/10       Goals:     Patient goals: Reduce pain/improve function R shoulder     Long Term Goals  Time Frame for Long Term Goals: In 4 weeks, patient will  demonstrate compliance and independence w/HEP. improve on Quick Dash score. report ability to participate in recreational activities w/o R neck, shoulder, parascapular pain. Summary of Evaluation:  Assessment: Pt is a 57 y.o. female w/DX R RC tendinitis. Pt reports chronic right shoulder, scapular and thoracic back pain laterally near ribs over the last 15 years. Has had chiropractics, massage/trigger point therapy, arthroscopic surgery in 2015 d/t labral fraying/decompression of acromion. Pt is right hand dominant. PLOF:  Pt is very independent w/all mobility, active w/bike riding, swimming but has had to modify activities since before and after shoulder surgery . CURRENT LOF:  Continues to be independent and very active in daily life (work and leisure), however w/worsening pain.         Subjective:  Sherice Salazar arrives to Chair - 1 x daily - 7 x weekly - 1 sets - 3 reps - 30 seconds hold  Standing Lower Cervical and Upper Thoracic Stretch - 1 x daily - 7 x weekly - 1 sets - 3 reps - 30 seconds hold  Seated Upper Thoracic Stretch - 1 x daily - 7 x weekly - 2 sets - 10 reps  Seated Upper Trapezius Stretch - 1 x daily - 7 x weekly - 1 sets - 3 reps - 20 seconds hold  Seated Levator Scapulae Stretch - 1 x daily - 7 x weekly - 1 sets - 3 reps - 20 seconds hold  Doorway Pec Stretch at 90 Degrees Abduction - 1 x daily - 7 x weekly - 1 sets - 3 reps - 30 seconds hold      Manual Treatments:        Modalities:    Cervical tx  14# x 15 min  Slight flex  Static       Communication with other providers:  POC faxed 7/08/22      Assessment:  Mindy Andino is not making much progress here in PT. She benefits from traction but only short term with symptoms returning after a day or so. She has tried multiple interventions over the years with no long term relief.  End session pain: same         Plan for Next Session: postural re-training/exercise/strengthening, scapular stabilization (R side focus), cervical tx 13-15# as lenard, static 15min prn, slight flexion position, modalities prn       Time In / Time Out:    1430/1511      Timed Code/Total Treatment Minutes: 39' 1 traction 20' (15' + 5' set up) 1 ADL 21'    Next Progress Note due:  8/08/22      Plan of Care Interventions:  [x] Therapeutic Exercise  [x] Modalities:  [x] Therapeutic Activity     [x] Ultrasound  [x] Estim  [] Gait Training      [x] Cervical Traction [] Lumbar Traction  [x] Neuromuscular Re-education    [x] Cold/hotpack [] Iontophoresis   [x] Instruction in HEP      [] Vasopneumatic   [x] Dry Needling    [x] Manual Therapy               [] Aquatic Therapy              Electronically signed by:  Geronimo Lopez PTA      8/5/2022, 6:56 AM

## 2022-08-11 ENCOUNTER — HOSPITAL ENCOUNTER (OUTPATIENT)
Dept: PHYSICAL THERAPY | Age: 62
Setting detail: THERAPIES SERIES
Discharge: HOME | End: 2022-08-11
Payer: COMMERCIAL

## 2022-08-11 PROCEDURE — 20561 NDL INSJ W/O NJX 3+ MUSC: CPT

## 2022-08-11 PROCEDURE — 97530 THERAPEUTIC ACTIVITIES: CPT

## 2022-08-11 PROCEDURE — 97140 MANUAL THERAPY 1/> REGIONS: CPT

## 2022-08-11 NOTE — FLOWSHEET NOTE
Outpatient Physical Therapy  [place]           [x] Phone: [phone]   Fax: [phone]  Elyssa obregon           [] Phone: [phone]   Fax: [phone]        Physical Therapy Daily Treatment Note  Date:  2022    Patient Name:  Javy Sahu    :  1960  MRN: [id number]  Restrictions/Precautions: No data recorded   Position Activity Restriction  Other position/activity restrictions: No formal restrictions  Diagnosis:   Other shoulder lesions, right shoulder [M75.81] Diagnosis: R RC tendinitis  Date of Injury/Surgery:   Treatment Diagnosis:  myofascial and referred pain from C-spine into shoulder, scapula, thoracic spine  Insurance/Certification information: [facility]  Referring Physician:  Es West MD   PCP: Mariette Oppenheim, MD  Next Doctor Visit:  Unknown  Plan of care signed (Y/N):  Pending  Outcome Measure:   Eval: Randall Montes (needs re-done/scoring not valid)  22:  Quick Dash 45.6% disability  Visit# / total visits:  8/  Pain level: 0/10       Goals:     Patient goals: Reduce pain/improve function R shoulder     Long Term Goals  Time Frame for Long Term Goals: In 4 weeks, patient will  demonstrate compliance and independence w/HEP. - ONGOING 22  improve on Quick Dash score. report ability to participate in recreational activities w/o R neck, shoulder, parascapular pain. - improved approx 50%/Unmet 22            Summary of Evaluation:  Assessment: Pt is a 57 y.o. female w/DX R RC tendinitis. Pt reports chronic right shoulder, scapular and thoracic back pain laterally near ribs over the last 15 years. Has had chiropractics, massage/trigger point therapy, arthroscopic surgery in 2015 d/t labral fraying/decompression of acromion. Pt is right hand dominant. PLOF:  Pt is very independent w/all mobility, active w/bike riding, swimming but has had to modify activities since before and after shoulder surgery .   CURRENT LOF:  Continues to be independent and very active in daily life (work and leisure), however w/worsening pain. Subjective:    Pt notes some improvement w/cervical tx and DNT. The \"knife\" pain in right mid-back/upper back/shoulder has decreased. Now noticing increased trigger points left UT and soreness both elbows since beginning therapy. Straps shoulders back w/a brace for posture control, which helps. Any changes in Ambulatory Summary Sheet? None        Objective:  COVID screening questions were asked and patient attested that there had been no contact or symptoms    Demonstrates good sitting posture most of the time, requires occasional cues for correction. Pronounced C7, thoracic kyphosis (mild); able to correct w/vc. Exercises: (No more than 4 columns)   Exercise/Equipment 7/25/22 #5 8/2/2022 #6 8/5/2022 #7 8/11/22  #8            WARM UP                        TABLE       *Chin tucks       *Scapular retraction   -    *Scapular protraction 2 x 10  RTB  (For serratus anterior activation) - -    *Shoulder horizontal abd 2 x 10  RTB  (For mid-trap activation) - -    *Shoulder flex to 90 deg 2 x 10  2# hand wts  (for lower trap activation) - -    *Rhomboids Rows Elbows at side and up at 90 deg RTB 2x10 ea - -      - -    STANDING                                                             PROPRIOCEPTION                                          MODALITIES traction   DNT                     Other Therapeutic Activities/Education:    POC and tx rationale/progression expected reviewed w/patient. 7/15/22:  Reviewed rationale, benefits and possible side effects of cervical traction. Pt wished to proceed. 8/2/22: Reviewed new HEP including new stretches. Discussed various ways to address stretches. Performed home education on appropriate posture during ADL's and working at her computer. 8/5/2022: Extensive education provided for home. Discussed desk adjustments, frequent stretching and various ways of stretching.  Also discussed posture at length. Home Exercise Program:    Chin tucks, scapular retraction discussed. Provide HO's next visit and progress HEP    Access Code: [email or web address]  URL: [email or web address]  Date: 07/25/2022  Prepared by: Varun Rainey    Exercises    Prone Chest Stretch on Chair - 1 x daily - 7 x weekly - 1 sets - 3 reps - 30 seconds hold  Standing Lower Cervical and Upper Thoracic Stretch - 1 x daily - 7 x weekly - 1 sets - 3 reps - 30 seconds hold  Seated Upper Thoracic Stretch - 1 x daily - 7 x weekly - 2 sets - 10 reps  Seated Upper Trapezius Stretch - 1 x daily - 7 x weekly - 1 sets - 3 reps - 20 seconds hold  Seated Levator Scapulae Stretch - 1 x daily - 7 x weekly - 1 sets - 3 reps - 20 seconds hold  Doorway Pec Stretch at 90 Degrees Abduction - 1 x daily - 7 x weekly - 1 sets - 3 reps - 30 seconds hold      Manual Treatments:    A/P thoracic mobilization (grade 1-2)  Prone w/prone pillow  STM using Free-up      Modalities:    DNT  Prone  Bilat thoracic paraspinals T1 - T8  0.30 x 30 mm  Bilat UTs  0.30 x 30 mm      Communication with other providers:  POC faxed 7/08/22, PN 8/      Assessment:    Pt has been seen x 8 physical therapy visits. Emmy Smith is not making much progress here in PT. Better temporarily after cervical traction and DNT (a day or so.)  Pt reports her father had similar issues and benefited from acupuncture. She benefits from traction but only short term with symptoms returning after a day or so. Slight improvement in goal achievement related to improved ability to perform daily activities. She has tried multiple interventions over the years with no long term relief. Will trial consistent DNT x 2 weeks to determine long-term benefit of the modality.   End session pain: same         Plan for Next Session: postural re-training/exercise/strengthening, scapular stabilization (R side focus), cervical tx 13-15# as lenard, static 15min prn, slight flexion position, modalities prn, DNT       Time In / Time Out:    1120/1210      Timed Code/Total Treatment Minutes: 50'/50'  Man 8' (1), TA 20' (1), DNT 20' (1)    Next Progress Note due:  9/11/22/22      Plan of Care Interventions:  [x] Therapeutic Exercise  [x] Modalities:  [x] Therapeutic Activity     [x] Ultrasound  [x] Estim  [] Gait Training      [x] Cervical Traction [] Lumbar Traction  [x] Neuromuscular Re-education    [x] Cold/hotpack [] Iontophoresis   [x] Instruction in HEP      [] Vasopneumatic   [x] Dry Needling    [x] Manual Therapy               [] Aquatic Therapy              Electronically signed by:  Ngoc Barreto, PT      8/11/2022, 11:26 AM

## 2022-08-16 ENCOUNTER — TELEPHONE (OUTPATIENT)
Dept: ORTHOPEDIC SURGERY | Age: 62
End: 2022-08-16

## 2022-08-16 ENCOUNTER — HOSPITAL ENCOUNTER (OUTPATIENT)
Dept: PHYSICAL THERAPY | Age: 62
Setting detail: THERAPIES SERIES
Discharge: HOME | End: 2022-08-16
Payer: COMMERCIAL

## 2022-08-16 PROCEDURE — 20561 NDL INSJ W/O NJX 3+ MUSC: CPT

## 2022-08-16 PROCEDURE — 97140 MANUAL THERAPY 1/> REGIONS: CPT

## 2022-08-16 NOTE — FLOWSHEET NOTE
Outpatient Physical Therapy           [place]           [] Phone: [phone]   Fax: [phone]  Amy [place]           [] Phone: [phone]   Fax: [phone]      To: Luis Fernando Méndez MD     From: Blanca Lincoln, LAVELLE   Patient: Fani Parson                    : 1960  Diagnosis:  Other shoulder lesions, right shoulder [M75.81]        Treatment Diagnosis:       Date: 2022  [x]  Progress Note                []  Discharge Note    Evaluation Date:  22   Total Visits to date:   8 Cancels/No-shows to date:      Subjective:    Pt notes some improvement w/cervical tx and DNT. The \"knife\" pain in right mid-back/upper back/shoulder has decreased. Now noticing increased trigger points left UT and soreness both elbows since beginning therapy. Straps shoulders back w/a brace for posture control, which helps. Plan of Care/Treatment to date:  [x] Therapeutic Exercise    [x] Modalities:  [x] Therapeutic Activity     [x] Ultrasound  [x] Electrical Stimulation  [] Gait Training      [x] Cervical Traction   [] Lumbar Traction  [x] Neuromuscular Re-education  [x] Cold/hotpack [] Iontophoresis  [x] Instruction in HEP      Other:  [x] Manual Therapy       []  Vasopneumatic  [] Aquatic Therapy       [x]   Dry Needle Therapy                      Objective/Significant Findings At Last Visit/Comments:    Demonstrates good sitting posture most of the time, requires occasional cues for correction. Pronounced C7, thoracic kyphosis (mild); able to correct w/vc. TTP B upper/mid cervical paraspinals, R UT and infraspinatus, R rhomboids    Assessment:     Pt has been seen x 8 physical therapy visits. Pineda Terrence is not making much progress here in PT. Better temporarily after cervical traction and DNT (a day or so.)  Pt reports her father had similar issues and benefited from acupuncture. She benefits from traction but only short term with symptoms returning after a day or so.    Slight improvement in goal achievement related to improved ability to perform daily activities. She has tried multiple interventions over the years with no long term relief. Will trial consistent DNT x 2 weeks to determine long-term benefit of the modality. Goal Status:  [] Achieved [x] Partially Achieved  [x] Not Achieved           Frequency/Duration:  # Days per week: [] 1 day # Weeks: [] 1 week [x] 4 weeks [] 8 weeks     [x] 2 days   [] 2 weeks [] 5 weeks [] 10 weeks     [] 3 days   [] 3 weeks [] 6 weeks [] 12 weeks       Rehab Potential: [] Excellent [x] Good [x] Fair  [] Poor         Patient Status: [] Continue per initial plan of Care     [] Patient now discharged     [] Additional visits requested, Please re-certify for additional visits:      Requested frequency/duration:  2/week for 4weeks    If we are requesting more visits, we fully anticipate the patient's condition is expected to improve within the treatment timeframe we are requesting. Electronically signed by:  Desmond Wren PT  8/16/2022, 12:32 PM    If you have any questions or concerns, please don't hesitate to call.   Thank you for your referral.    Physician Signature:______________________ Date:______ Time: ________  By signing above, therapists plan is approved by physician

## 2022-08-16 NOTE — FLOWSHEET NOTE
Outpatient Physical Therapy  [place]           [x] Phone: [phone]   Fax: [phone]  [facility]           [] Phone: [phone]   Fax: [phone]        Physical Therapy Daily Treatment Note  Date:  2022    Patient Name:  Johann Segura    :  1960  MRN: [id number]  Restrictions/Precautions: No data recorded   Position Activity Restriction  Other position/activity restrictions: No formal restrictions  Diagnosis:   Other shoulder lesions, right shoulder [M75.81] Diagnosis: R RC tendinitis  Date of Injury/Surgery:   Treatment Diagnosis:  myofascial and referred pain from C-spine into shoulder, scapula, thoracic spine  Insurance/Certification information: [facility]  Referring Physician:  Julianna Melara MD   PCP: Daniele Guzmán MD  Next Doctor Visit:  Unknown  Plan of care signed (Y/N):  YES  Outcome Measure:   Eval: Mitchel Acevedo (needs re-done/scoring not valid)  22:  Quick Dash 45.6% disability  Visit# / total visits:  9/    Pain level: 3-4/10       Goals:     Patient goals: Reduce pain/improve function R shoulder     Long Term Goals  Time Frame for Long Term Goals: In 4 weeks, patient will  demonstrate compliance and independence w/HEP. - ONGOING 22  improve on Quick Dash score. - UNMET 22  report ability to participate in recreational activities w/o R neck, shoulder, parascapular pain. - improved approx 50%/Unmet 22            Summary of Evaluation:  Assessment: Pt is a 57 y.o. female w/DX R RC tendinitis. Pt reports chronic right shoulder, scapular and thoracic back pain laterally near ribs over the last 15 years. Has had chiropractics, massage/trigger point therapy, arthroscopic surgery in 2015 d/t labral fraying/decompression of acromion. Pt is right hand dominant. PLOF:  Pt is very independent w/all mobility, active w/bike riding, swimming but has had to modify activities since before and after shoulder surgery .   CURRENT LOF:  Continues to be independent and very active in daily life (work and leisure), however w/worsening pain. Subjective:    Pt notes some improvement w/cervical tx and DNT. The \"knife\" pain in right mid-back/upper back/shoulder has decreased. Now noticing increased trigger points left UT and soreness both elbows since beginning therapy. Straps shoulders back w/a brace for posture control, which helps. Any changes in Ambulatory Summary Sheet? None        Objective:  COVID screening questions were asked and patient attested that there had been no contact or symptoms    Demonstrates good sitting posture most of the time, requires occasional cues for correction. Pronounced C7, thoracic kyphosis (mild); able to correct w/vc. TTP bilat cervical and thoracic paraspinals R>L, TTP B UE L>R, TrP bilat UTs        Exercises: (No more than 4 columns)   Exercise/Equipment 7/25/22 #5 8/2/2022 #6 8/5/2022 #7 8/11/22  #8 8/16/22 #9          PROGRESS NOTE SENT W/GOALS ASSESS 8/11/22   WARM UP                           TABLE        *Chin tucks        *Scapular retraction   -     *Scapular protraction 2 x 10  RTB  (For serratus anterior activation) - -     *Shoulder horizontal abd 2 x 10  RTB  (For mid-trap activation) - -     *Shoulder flex to 90 deg 2 x 10  2# hand wts  (for lower trap activation) - -     *Rhomboids Rows Elbows at side and up at 90 deg RTB 2x10 ea - -       - -     STANDING                                                                     PROPRIOCEPTION                                                MODALITIES traction   DNT DNT                       Other Therapeutic Activities/Education:    POC and tx rationale/progression expected reviewed w/patient. 7/15/22:  Reviewed rationale, benefits and possible side effects of cervical traction. Pt wished to proceed. 8/2/22: Reviewed new HEP including new stretches. Discussed various ways to address stretches.  Performed home education on appropriate posture during ADL's and working at her computer. 8/5/2022: Extensive education provided for home. Discussed desk adjustments, frequent stretching and various ways of stretching. Also discussed posture at length. Home Exercise Program:    Chin tucks, scapular retraction discussed. Provide HO's next visit and progress HEP    Access Code: [email or web address]  URL: [email or web address]  Date: 07/25/2022  Prepared by: Susu Malloy    Exercises    Prone Chest Stretch on Chair - 1 x daily - 7 x weekly - 1 sets - 3 reps - 30 seconds hold  Standing Lower Cervical and Upper Thoracic Stretch - 1 x daily - 7 x weekly - 1 sets - 3 reps - 30 seconds hold  Seated Upper Thoracic Stretch - 1 x daily - 7 x weekly - 2 sets - 10 reps  Seated Upper Trapezius Stretch - 1 x daily - 7 x weekly - 1 sets - 3 reps - 20 seconds hold  Seated Levator Scapulae Stretch - 1 x daily - 7 x weekly - 1 sets - 3 reps - 20 seconds hold  Doorway Pec Stretch at 90 Degrees Abduction - 1 x daily - 7 x weekly - 1 sets - 3 reps - 30 seconds hold      Manual Treatments:    A/P thoracic mobilization (grade 1-2)  Prone w/prone pillow  STM using Free-up      Modalities:    DNT  Prone  Bilat thoracic paraspinals T1 - T8  0.30 x 30 mm  Bilat UTs  0.30 x 30 mm      Communication with other providers:  POC faxed 7/08/22, PN 8/      Assessment:    Pt has been seen x 9 physical therapy visits. Amparo Bourne is not making much progress here in PT. Better temporarily after cervical traction and DNT (a day or so.)  Pt reports her father had similar issues and benefited from acupuncture. She benefits from traction but only short term with symptoms returning after a day or so. Slight improvement in goal achievement related to improved ability to perform daily activities. She has tried multiple interventions over the years with no long term relief. Will trial consistent DNT x 2 weeks (effective 8/11/22) to determine long-term benefit of the modality.   End session pain: same         Plan for Next Session: postural re-training/exercise/strengthening, scapular stabilization (R side focus), cervical tx 13-15# as lenard, static 15min prn, slight flexion position, modalities prn, DNT       Time In / Time Out:    1145/1215      Timed Code/Total Treatment Minutes: 30'/30'  Manual 10' (1), DNT 20' (1)    Next Progress Note due:  9/11/22/22      Plan of Care Interventions:  [x] Therapeutic Exercise  [x] Modalities:  [x] Therapeutic Activity     [x] Ultrasound  [x] Estim  [] Gait Training      [x] Cervical Traction [] Lumbar Traction  [x] Neuromuscular Re-education    [x] Cold/hotpack [] Iontophoresis   [x] Instruction in HEP      [] Vasopneumatic   [x] Dry Needling    [x] Manual Therapy               [] Aquatic Therapy              Electronically signed by:  Catrachito Reis, LAVELLE      8/16/2022, 12:45 PM

## 2022-08-16 NOTE — TELEPHONE ENCOUNTER
Patient came in today to see if she could possibly get Gelsyn injections. Pt said she has had them in the past and they helped. Patient said she still has the same insurance.  Please [id number]

## 2022-08-18 NOTE — TELEPHONE ENCOUNTER
I called [facility] and spoke with Valencia Degroot who assisted me in starting a precert for [facility] for M17.11 Right knee osteoarthritis      Chan Lock  [phone]  [id number]  Buy and bill    Pending Auth # [id number]

## 2022-08-19 ENCOUNTER — HOSPITAL ENCOUNTER (OUTPATIENT)
Dept: PHYSICAL THERAPY | Age: 62
Setting detail: THERAPIES SERIES
Discharge: HOME | End: 2022-08-19
Payer: COMMERCIAL

## 2022-08-19 PROCEDURE — 97140 MANUAL THERAPY 1/> REGIONS: CPT

## 2022-08-19 PROCEDURE — 20561 NDL INSJ W/O NJX 3+ MUSC: CPT

## 2022-08-19 NOTE — FLOWSHEET NOTE
Outpatient Physical Therapy  [place]           [x] Phone: [phone]   Fax: [phone]  Elyssa obregon           [] Phone: [phone]   Fax: [phone]        Physical Therapy Daily Treatment Note  Date:  2022    Patient Name:  Augusto Cramer    :  1960  MRN: [id number]  Restrictions/Precautions: No data recorded   Position Activity Restriction  Other position/activity restrictions: No formal restrictions  Diagnosis:   Other shoulder lesions, right shoulder [M75.81] Diagnosis: R RC tendinitis  Date of Injury/Surgery:   Treatment Diagnosis:  myofascial and referred pain from C-spine into shoulder, scapula, thoracic spine  Insurance/Certification information: [facility]  Referring Physician:  Debbie Justin MD   PCP: Mg Jaime MD  Next Doctor Visit:  Unknown  Plan of care signed (Y/N):  YES  Outcome Measure:   Eval: Casey Cutler (needs re-done/scoring not valid)  22:  Quick Dash 45.6% disability  Visit# / total visits:  10/    Pain level: 210       Goals:     Patient goals: Reduce pain/improve function R shoulder     Long Term Goals  Time Frame for Long Term Goals: In 4 weeks, patient will  demonstrate compliance and independence w/HEP. - ONGOING 22  improve on Quick Dash score. - UNMET 22  report ability to participate in recreational activities w/o R neck, shoulder, parascapular pain. - improved approx 50%/Unmet 22            Summary of Evaluation:  Assessment: Pt is a 57 y.o. female w/DX R RC tendinitis. Pt reports chronic right shoulder, scapular and thoracic back pain laterally near ribs over the last 15 years. Has had chiropractics, massage/trigger point therapy, arthroscopic surgery in 2015 d/t labral fraying/decompression of acromion. Pt is right hand dominant. PLOF:  Pt is very independent w/all mobility, active w/bike riding, swimming but has had to modify activities since before and after shoulder surgery .   CURRENT LOF:  Continues to be independent and very active in daily life (work and leisure), however w/worsening pain. Subjective:    Pt notes some improvement w/cervical tx and DNT. No longer getting the \"knife\" pain in her back. Overall, doing better. Straps shoulders back w/a brace for posture control, which helps. Any changes in Ambulatory Summary Sheet? None        Objective:  COVID screening questions were asked and patient attested that there had been no contact or symptoms    Demonstrates good sitting posture most of the time, requires occasional cues for correction. Pronounced C7, thoracic kyphosis (mild); able to correct w/vc. TTP bilat cervical and thoracic paraspinals R>L, TTP B UE L>R, TrP bilat UTs        Exercises: (No more than 4 columns)   Exercise/Equipment 7/25/22 #5 8/2/2022 #6 8/5/2022 #7 8/11/22  #8 8/16/22 #9 8/19/22 #10          PROGRESS NOTE SENT W/GOALS ASSESS 8/11/22    WARM UP                              TABLE         *Chin tucks         *Scapular retraction   -      *Scapular protraction 2 x 10  RTB  (For serratus anterior activation) - -      *Shoulder horizontal abd 2 x 10  RTB  (For mid-trap activation) - -      *Shoulder flex to 90 deg 2 x 10  2# hand wts  (for lower trap activation) - -      *Rhomboids Rows Elbows at side and up at 90 deg RTB 2x10 ea - -        - -      STANDING                                                                             PROPRIOCEPTION                                                      MODALITIES traction   DNT DNT DNT                         Other Therapeutic Activities/Education:    POC and tx rationale/progression expected reviewed w/patient. 7/15/22:  Reviewed rationale, benefits and possible side effects of cervical traction. Pt wished to proceed. 8/2/22: Reviewed new HEP including new stretches. Discussed various ways to address stretches. Performed home education on appropriate posture during ADL's and working at her computer.      8/5/2022: Extensive education provided for home. Discussed desk adjustments, frequent stretching and various ways of stretching. Also discussed posture at length. Home Exercise Program:    Chin tucks, scapular retraction discussed. Provide HO's next visit and progress HEP    Access Code: [email or web address]  URL: [email or web address]  Date: 07/25/2022  Prepared by: Anny Tolentino    Exercises    Prone Chest Stretch on Chair - 1 x daily - 7 x weekly - 1 sets - 3 reps - 30 seconds hold  Standing Lower Cervical and Upper Thoracic Stretch - 1 x daily - 7 x weekly - 1 sets - 3 reps - 30 seconds hold  Seated Upper Thoracic Stretch - 1 x daily - 7 x weekly - 2 sets - 10 reps  Seated Upper Trapezius Stretch - 1 x daily - 7 x weekly - 1 sets - 3 reps - 20 seconds hold  Seated Levator Scapulae Stretch - 1 x daily - 7 x weekly - 1 sets - 3 reps - 20 seconds hold  Doorway Pec Stretch at 90 Degrees Abduction - 1 x daily - 7 x weekly - 1 sets - 3 reps - 30 seconds hold      Manual Treatments:    A/P thoracic mobilization (grade 1-2)  Prone w/prone pillow  STM using Free-up      Modalities:    DNT  Prone  Bilat thoracic paraspinals T1 - T9  0.30 x 30 mm  Bilat UTs  0.30 x 30 mm      Communication with other providers:  POC faxed 7/08/22, PN 8/16/22      Assessment:    Pt has been seen x 10 physical therapy visits. Starting to have improvement/decrease in myofascial pain with interventions of cervical traction and now DNT. No longer having the sharp pain while riding bike. Trigger points under control. Slight improvement in goal achievement related to improved ability to perform daily activities. She has tried multiple interventions over the years with no long term relief. Pt will benefit from another week of treatment, then perhaps a week break to work on exercise and determine continued need for modalities.     End session pain: same         Plan for Next Session: postural re-training/exercise/strengthening, scapular stabilization (R side focus), cervical tx 13-15# as lenard, static 15min prn, slight flexion position, modalities prn, DNT       Time In / Time Out:    1205/1244      Timed Code/Total Treatment Minutes: 39'/39'  Man 13' (1), DNT 24' (1)    Next Progress Note due:  9/11/22/22      Plan of Care Interventions:  [x] Therapeutic Exercise  [x] Modalities:  [x] Therapeutic Activity     [x] Ultrasound  [x] Estim  [] Gait Training      [x] Cervical Traction [] Lumbar Traction  [x] Neuromuscular Re-education    [x] Cold/hotpack [] Iontophoresis   [x] Instruction in HEP      [] Vasopneumatic   [x] Dry Needling    [x] Manual Therapy               [] Aquatic Therapy              Electronically signed by:  Catrachito Reis, PT      8/19/2022, 12:31 PM

## 2022-08-22 LAB
CULTURE FUNGUS SKIN: NORMAL
KOH PREP: NORMAL

## 2022-08-30 ENCOUNTER — HOSPITAL ENCOUNTER (OUTPATIENT)
Dept: PHYSICAL THERAPY | Age: 62
Setting detail: THERAPIES SERIES
Discharge: HOME | End: 2022-08-30
Payer: COMMERCIAL

## 2022-08-30 PROCEDURE — 20561 NDL INSJ W/O NJX 3+ MUSC: CPT

## 2022-08-30 PROCEDURE — 97140 MANUAL THERAPY 1/> REGIONS: CPT

## 2022-08-30 NOTE — FLOWSHEET NOTE
very active in daily life (work and leisure), however w/worsening pain. Subjective:    Pt notes some improvement w/cervical tx and DNT. No longer getting the \"knife\" pain in her back. Overall, doing better. Pain no higher than 4 or 5/10 and able to work out trigger points w/manual techniques. Straps shoulders back w/a brace for posture control, which helps. Any changes in Ambulatory Summary Sheet? None        Objective:  COVID screening questions were asked and patient attested that there had been no contact or symptoms    Demonstrates good sitting posture most of the time, requires occasional cues for correction. Pronounced C7, thoracic kyphosis (mild); able to correct w/vc. Minimal TTP bilat cervical and thoracic paraspinals R>L, TTP B UE L>R, TrP bilat UTs  Performs HEP independently and properly        Exercises: (No more than 4 columns)   Exercise/Equipment 8/5/2022 #7 8/11/22  #8 8/16/22 #9 8/19/22 #10 8/30/22  #11        PROGRESS NOTE SENT W/GOALS ASSESS 8/11/22     WARM UP                           TABLE        *Chin tucks        *Scapular retraction -       *Scapular protraction -       *Shoulder horizontal abd -       *Shoulder flex to 90 deg -       *Rhomboids -        -       STANDING                                                                     PROPRIOCEPTION                                                MODALITIES  DNT DNT DNT DNT                       Other Therapeutic Activities/Education:    POC and tx rationale/progression expected reviewed w/patient. 7/15/22:  Reviewed rationale, benefits and possible side effects of cervical traction. Pt wished to proceed. 8/2/22: Reviewed new HEP including new stretches. Discussed various ways to address stretches. Performed home education on appropriate posture during ADL's and working at her computer. 8/5/2022: Extensive education provided for home.  Discussed desk adjustments, frequent stretching and various ways of stretching. Also discussed posture at length. Home Exercise Program:    Chin tucks, scapular retraction discussed. Provide HO's next visit and progress HEP    Access Code: [email or web address]  URL: [email or web address]  Date: 07/25/2022  Prepared by: Ana Cristinarine Going    Exercises    Prone Chest Stretch on Chair - 1 x daily - 7 x weekly - 1 sets - 3 reps - 30 seconds hold  Standing Lower Cervical and Upper Thoracic Stretch - 1 x daily - 7 x weekly - 1 sets - 3 reps - 30 seconds hold  Seated Upper Thoracic Stretch - 1 x daily - 7 x weekly - 2 sets - 10 reps  Seated Upper Trapezius Stretch - 1 x daily - 7 x weekly - 1 sets - 3 reps - 20 seconds hold  Seated Levator Scapulae Stretch - 1 x daily - 7 x weekly - 1 sets - 3 reps - 20 seconds hold  Doorway Pec Stretch at 90 Degrees Abduction - 1 x daily - 7 x weekly - 1 sets - 3 reps - 30 seconds hold      Manual Treatments:    A/P thoracic mobilization (grade 1-2)  Prone w/prone pillow  STM using Free-up      Modalities:    DNT  Prone  Bilat thoracic paraspinals T1 - T9  0.30 x 30 mm  Bilat UTs  0.30 x 30 mm      Communication with other providers:  POC faxed 7/08/22, PN 8/16/22      Assessment:    Pt has been seen x 11 physical therapy visits. Starting to have improvement/decrease in myofascial pain with interventions of cervical traction and now DNT. No longer having the sharp pain while riding bike. Trigger points under control. Slight improvement in goal achievement related to improved ability to perform daily activities. She has tried multiple interventions over the years with no long term relief. Pt will benefit from another week of treatment, then perhaps a week break to work on exercise and determine continued need for modalities. End session pain: same. Per pt, \"It feels better later. \"        Plan for Next Session: postural re-training/exercise/strengthening, scapular stabilization (R side focus), cervical tx 13-15# as lenard, static 15min prn, slight flexion position, modalities prn, DNT       Time In / Time Out:    0256/1724      Timed Code/Total Treatment Minutes: 38'/38'  Manual 10' (1), DNT 29' (1)    Next Progress Note due:  9/11/22/22      Plan of Care Interventions:  [x] Therapeutic Exercise  [x] Modalities:  [x] Therapeutic Activity     [x] Ultrasound  [x] Estim  [] Gait Training      [x] Cervical Traction [] Lumbar Traction  [x] Neuromuscular Re-education    [x] Cold/hotpack [] Iontophoresis   [x] Instruction in HEP      [] Vasopneumatic   [x] Dry Needling    [x] Manual Therapy               [] Aquatic Therapy              Electronically signed by:  Emi Dhillon, PT      8/30/2022, 4:47 PM

## 2022-09-01 ENCOUNTER — HOSPITAL ENCOUNTER (OUTPATIENT)
Dept: PHYSICAL THERAPY | Age: 62
Setting detail: THERAPIES SERIES
Discharge: HOME | End: 2022-09-01
Payer: COMMERCIAL

## 2022-09-01 PROCEDURE — 97140 MANUAL THERAPY 1/> REGIONS: CPT

## 2022-09-01 PROCEDURE — 20561 NDL INSJ W/O NJX 3+ MUSC: CPT

## 2022-09-01 NOTE — DISCHARGE SUMMARY
Outpatient Physical Therapy           [place]           [] Phone: [phone]   Fax: [phone]  Mekhi Arciniega           [] Phone: [phone]   Fax: [phone]      To: Jazmin Saldana MD     From: Francisco Javier Diallo, LAVELLE   Patient: Shen Mata                    : 1960  Diagnosis:  Other shoulder lesions, right shoulder [M75.81]        Treatment Diagnosis:    myofascial and referred pain from C-spine into shoulder, scapula, thoracic spine   Date: 2022  []  Progress Note                [x]  Discharge Note    Evaluation Date:  22   Total Visits to date:   12 Cancels/No-shows to date:  0    Subjective:    Pt notes some improvement w/cervical tx and DNT. No longer getting the \"knife\" pain in her back. Overall, doing better. Pain no higher than 4 or 5/10 and able to work out trigger points w/manual techniques. Straps shoulders back w/a brace for posture control, which helps. Plan of Care/Treatment to date:  [x] Therapeutic Exercise    [] Modalities:  [x] Therapeutic Activity     [] Ultrasound  [] Electrical Stimulation  [] Gait Training      [x] Cervical Traction   [] Lumbar Traction  [x] Neuromuscular Re-education  [] Cold/hotpack [] Iontophoresis  [x] Instruction in HEP      Other:  [x] Manual Therapy       []  Vasopneumatic  [] Aquatic Therapy       [x]   Dry Needle Therapy                      Objective/Significant Findings At Last Visit/Comments:    Demonstrates good sitting posture most of the time, requires occasional cues for correction. Pronounced C7, thoracic kyphosis (mild); able to correct w/vc. Minimal TTP bilat cervical and thoracic paraspinals R>L, TTP B UE L>R, TrP bilat UTs  Performs HEP independently and properly. Today, trigger points R UT and R rhomboids    Assessment:     Pt has been seen x 12 physical therapy visits from 22 - 22.   Diagnosis:   Other shoulder lesions, right shoulder [M75.81] Diagnosis: R RC tendinitis  Date of Injury/Surgery: chronic  Treatment Diagnosis:  myofascial and referred pain from C-spine into shoulder, scapula, thoracic spine  Decreased pain, improved soft tissue extensibility, decreased trigger points. Cervical tx, DNT and exercise have been beneficial.  No longer having the sharp pain while riding bike. Trigger points under control. Slight improvement in goal achievement related to improved ability to perform daily activities. Goals met or nearly met. Discharge       Goal Status:  [] Achieved [x] Partially Achieved  [] Not Achieved   Patient goals: Reduce pain/improve function R shoulder - Partially Met  Long Term Goals  Time Frame for Long Term Goals: In 4 weeks, patient will  demonstrate compliance and independence w/HEP. - ONGOING 8/11/22  improve on Quick Dash score. - UNMET 8/11/22  report ability to participate in recreational activities w/o R neck, shoulder, parascapular pain. - improved approx 50%/Unmet 8/11/22      Patient Status: [] Continue per initial plan of Care     [x] Patient now discharged - goals partially met/plateau     [] Additional visits requested, Please re-certify for additional visits: If we are requesting more visits, we fully anticipate the patient's condition is expected to improve within the treatment timeframe we are requesting. Electronically signed by:  Mey Garcia, PT 9/1/2022, 3:27 PM    If you have any questions or concerns, please don't hesitate to call.   Thank you for your referral.    Physician Signature:______________________ Date:______ Time: ________  By signing above, therapists plan is approved by physician

## 2022-09-01 NOTE — TELEPHONE ENCOUNTER
I called and to check on the status of pt's precert. I was informed that the 1st request was cancelled due to processing error. The request was inputted into the wrong system. I spoke with Ray Diaz who assisted me in starting a new precert with the information provided previously.     Approval: [id number]  9/1/22-3/1/23  M17.11

## 2022-09-01 NOTE — FLOWSHEET NOTE
Outpatient Physical Therapy  [facility]           [x] Phone: [phone]   Fax: [phone]  Katiuska Gallagher           [] Phone: [phone]   Fax: [phone]        Physical Therapy Daily Treatment Note  DISCHARGE  Date:  2022    Patient Name:  Astrid Hudson    :  1960  MRN: [id number]  Restrictions/Precautions: No data recorded   Position Activity Restriction  Other position/activity restrictions: No formal restrictions  Diagnosis:   Other shoulder lesions, right shoulder [M75.81] Diagnosis: R RC tendinitis  Date of Injury/Surgery:   Treatment Diagnosis:  myofascial and referred pain from C-spine into shoulder, scapula, thoracic spine  Insurance/Certification information: [facility]  Referring Physician:  Sharda Lara MD   PCP: Wai River MD  Next Doctor Visit:  Unknown  Plan of care signed (Y/N):  YES  Outcome Measure:   Eval: Nelsy Mehta (needs re-done/scoring not valid)  22:  Quick Dash 45.6% disability  Visit# / total visits:  12/    Pain level: 1/10       Goals:     Patient goals: Reduce pain/improve function R shoulder     Long Term Goals  Time Frame for Long Term Goals: In 4 weeks, patient will  demonstrate compliance and independence w/HEP. - ONGOING 22  improve on Quick Dash score. - UNMET 22  report ability to participate in recreational activities w/o R neck, shoulder, parascapular pain. - improved approx 50%/Unmet 22            Summary of Evaluation:  Assessment: Pt is a 57 y.o. female w/DX R RC tendinitis. Pt reports chronic right shoulder, scapular and thoracic back pain laterally near ribs over the last 15 years. Has had chiropractics, massage/trigger point therapy, arthroscopic surgery in 2015 d/t labral fraying/decompression of acromion. Pt is right hand dominant. PLOF:  Pt is very independent w/all mobility, active w/bike riding, swimming but has had to modify activities since before and after shoulder surgery .   CURRENT LOF:  Continues to be independent and very active in daily life (work and leisure), however w/worsening pain. Subjective:    Pt notes some improvement w/cervical tx and DNT. No longer getting the \"knife\" pain in her back. Overall, doing better. Pain no higher than 4 or 5/10 and able to work out trigger points w/manual techniques. Straps shoulders back w/a brace for posture control, which helps. Any changes in Ambulatory Summary Sheet? None        Objective:  COVID screening questions were asked and patient attested that there had been no contact or symptoms    Demonstrates good sitting posture most of the time, requires occasional cues for correction. Pronounced C7, thoracic kyphosis (mild); able to correct w/vc. Minimal TTP bilat cervical and thoracic paraspinals R>L, TTP B UE L>R, TrP bilat UTs  Performs HEP independently and properly. Today, trigger points R UT and R rhomboids    Exercises: (No more than 4 columns)   Exercise/Equipment 8/5/2022 #7 8/11/22  #8 8/16/22 #9 8/19/22 #10 8/30/22  #11 9/01/22 #11        PROGRESS NOTE SENT W/GOALS ASSESS 8/11/22   DISCHARGE     WARM UP                              TABLE      Ind. W/HEP   *Chin tucks         *Scapular retraction -        *Scapular protraction -        *Shoulder horizontal abd -        *Shoulder flex to 90 deg -        *Rhomboids -         -        STANDING                                                                             PROPRIOCEPTION                                                      MODALITIES  DNT DNT DNT DNT DNT                         Other Therapeutic Activities/Education:    POC and tx rationale/progression expected reviewed w/patient. 7/15/22:  Reviewed rationale, benefits and possible side effects of cervical traction. Pt wished to proceed. 8/2/22: Reviewed new HEP including new stretches. Discussed various ways to address stretches. Performed home education on appropriate posture during ADL's and working at her computer. 8/5/2022: Extensive education provided for home. Discussed desk adjustments, frequent stretching and various ways of stretching. Also discussed posture at length. Home Exercise Program:    Chin tucks, scapular retraction discussed. Provide HO's next visit and progress HEP - done    Access Code: [email or web address]  URL: [email or web address]  Date: 07/25/2022  Prepared by: Del Collar    Exercises    Prone Chest Stretch on Chair - 1 x daily - 7 x weekly - 1 sets - 3 reps - 30 seconds hold  Standing Lower Cervical and Upper Thoracic Stretch - 1 x daily - 7 x weekly - 1 sets - 3 reps - 30 seconds hold  Seated Upper Thoracic Stretch - 1 x daily - 7 x weekly - 2 sets - 10 reps  Seated Upper Trapezius Stretch - 1 x daily - 7 x weekly - 1 sets - 3 reps - 20 seconds hold  Seated Levator Scapulae Stretch - 1 x daily - 7 x weekly - 1 sets - 3 reps - 20 seconds hold  Doorway Pec Stretch at 90 Degrees Abduction - 1 x daily - 7 x weekly - 1 sets - 3 reps - 30 seconds hold      Manual Treatments:    A/P thoracic mobilization (grade 1-2)  Prone w/prone pillow  STM using Free-up      Modalities:    DNT  Prone  Bilat thoracic paraspinals T1 - T9  0.30 x 30 mm  Bilat UTs  0.30 x 30 mm      Communication with other providers:  POC faxed 7/08/22, PN 8/16/22, Discharge 9/01/22      Assessment:    Pt has been seen x 12 physical therapy visits from 5/09/22 - 9/01/22. Diagnosis:   Other shoulder lesions, right shoulder [M75.81] Diagnosis: R RC tendinitis  Date of Injury/Surgery: chronic  Treatment Diagnosis:  myofascial and referred pain from C-spine into shoulder, scapula, thoracic spine  Decreased pain, improved soft tissue extensibility, decreased trigger points. Cervical tx, DNT and exercise have been beneficial.  No longer having the sharp pain while riding bike. Trigger points under control. Slight improvement in goal achievement related to improved ability to perform daily activities.   Goals met or nearly met.  Discharge    End session pain: same. Per pt, \"It feels better later. \"        Plan for Next Session: Discharged    Time In / Time Out:    1452/      Timed Code/Total Treatment Minutes:     Next Progress Note due:  Discharged      Plan of Care Interventions:  [x] Therapeutic Exercise  [x] Modalities:  [x] Therapeutic Activity     [x] Ultrasound  [x] Estim  [] Gait Training      [x] Cervical Traction [] Lumbar Traction  [x] Neuromuscular Re-education    [x] Cold/hotpack [] Iontophoresis   [x] Instruction in HEP      [] Vasopneumatic   [x] Dry Needling    [x] Manual Therapy               [] Aquatic Therapy              Electronically signed by:  Benetta Schwab, PT      9/1/2022, 2:55 PM

## 2022-09-06 ENCOUNTER — HOSPITAL ENCOUNTER (OUTPATIENT)
Dept: PHYSICAL THERAPY | Age: 62
Discharge: HOME | End: 2022-09-06

## 2022-09-09 NOTE — TELEPHONE ENCOUNTER
Called pt to schedule her gelsyn injections, no answer, let message for pt to contact us back to get those scheduled

## 2022-09-29 ENCOUNTER — OFFICE VISIT (OUTPATIENT)
Dept: ORTHOPEDIC SURGERY | Age: 62
End: 2022-09-29
Payer: COMMERCIAL

## 2022-09-29 VITALS
WEIGHT: 117.2 LBS | DIASTOLIC BLOOD PRESSURE: 74 MMHG | RESPIRATION RATE: 16 BRPM | HEART RATE: 73 BPM | HEIGHT: 63 IN | SYSTOLIC BLOOD PRESSURE: 116 MMHG | BODY MASS INDEX: 20.77 KG/M2 | OXYGEN SATURATION: 97 %

## 2022-09-29 DIAGNOSIS — M17.11 PRIMARY OSTEOARTHRITIS OF RIGHT KNEE: Primary | ICD-10-CM

## 2022-09-29 PROCEDURE — 20610 DRAIN/INJ JOINT/BURSA W/O US: CPT | Performed by: PHYSICIAN ASSISTANT

## 2022-09-29 PROCEDURE — 99999 PR OFFICE/OUTPT VISIT,PROCEDURE ONLY: CPT | Performed by: PHYSICIAN ASSISTANT

## 2022-09-29 NOTE — PATIENT INSTRUCTIONS
Juan Luisn 3 # 1  Rest both knees for 24-48 hours  Work on ROM and strengthening of knees and legs   May take NSAIDS or anti-inflammatories as needed  Weightbearing as tolerated  Follow up in one week for the 2nd injection

## 2022-09-29 NOTE — PROGRESS NOTES
VISCO-SUPPLEMENTATION INJECTION (Number 1)    HISTORY OF PRESENT ILLNESS (HPI):   Luis Hoffman is a 60y.o. year old female who is here for follow up for visco-supplementation injection number 1 for   1. Primary osteoarthritis of right knee    . PAST HISTORY:   Unless otherwise specified in this note, the past history is reviewed today with the patient and is as follows-    Allergies   Allergen Reactions    Gluten Meal     Seasonal        Current Outpatient Medications   Medication Sig Dispense Refill    atomoxetine (STRATTERA) 60 MG capsule Take 1 capsule by mouth daily 90 capsule 1    valACYclovir (VALTREX) 1 g tablet 2 TABS BY MOUTH EVERY 12 HOURS HOURS X 1 DAY FOR COLD SORES 24 tablet 0    fluticasone (FLONASE) 50 MCG/ACT nasal spray 2 sprays by Each Nostril route daily 3 g 3    Menaquinone-7 (VITAMIN K2 PO) Take 1 tablet by mouth daily      thyroid (ARMOUR) 15 MG tablet Take 1 tablet by mouth daily      RESTASIS 0.05 % ophthalmic emulsion Apply 1 drop to eye 2 times daily  1    Cholecalciferol (VITAMIN D3 PO) Take by mouth       No current facility-administered medications for this visit. PHYSICAL EXAM:   /74 (Site: Left Upper Arm, Position: Sitting, Cuff Size: Medium Adult)   Pulse 73   Resp 16   Ht 5' 3.25\" (1.607 m)   Wt 117 lb 3.2 oz (53.2 kg)   LMP 11/09/2011 (Exact Date)   SpO2 97%   BMI 20.60 kg/m²     The right knee is examined:  Inspection:  Skin is intact. No erythema. Palpation:  No swelling or acute tenderness. Neuro/Vascular/Motor:  Sensation to light touch intact. Capillary refill brisk. No focal motor deficits. DIAGNOSIS:     1. Primary osteoarthritis of right knee        PROCEDURE:   Right Knee Aspiration / Injection Procedure:  Multiple treatment options were discussed. Joint injection was recommended. Details of the procedure, potential risks, and potential benefits were discussed. Patient's questions were answered.   Patient elected to proceed with procedure. Medication: Gelsyn 3 (2.5 mL)  NDC #: [address]-48  Lot #: [id number]  Procedure: Sterile technique was used as the skin over the injection site was prepped with alcohol then the knee joint was then injected through the inferior lateral joint with the above listed medication. A sterile bandage was placed over the injection site. The patient tolerated the procedure well without complication. The patient is scheduled 1st injection.

## 2022-10-05 ENCOUNTER — OFFICE VISIT (OUTPATIENT)
Dept: ORTHOPEDIC SURGERY | Age: 62
End: 2022-10-05
Payer: COMMERCIAL

## 2022-10-05 VITALS
HEART RATE: 62 BPM | BODY MASS INDEX: 19.63 KG/M2 | DIASTOLIC BLOOD PRESSURE: 74 MMHG | OXYGEN SATURATION: 97 % | RESPIRATION RATE: 16 BRPM | SYSTOLIC BLOOD PRESSURE: 122 MMHG | WEIGHT: 115 LBS | HEIGHT: 64 IN

## 2022-10-05 DIAGNOSIS — M17.11 PRIMARY OSTEOARTHRITIS OF RIGHT KNEE: Primary | ICD-10-CM

## 2022-10-05 PROCEDURE — 20610 DRAIN/INJ JOINT/BURSA W/O US: CPT | Performed by: PHYSICIAN ASSISTANT

## 2022-10-05 NOTE — PROGRESS NOTES
VISCO-SUPPLEMENTATION INJECTION (Number 2)    HISTORY OF PRESENT ILLNESS (HPI):   Alberto Tadeo is a 57y.o. year old female who is here for follow up for visco-supplementation injection number 2 for   1. Primary osteoarthritis of right knee    . PAST HISTORY:   Unless otherwise specified in this note, the past history is reviewed today with the patient and is as follows-    Allergies   Allergen Reactions    Gluten Meal     Seasonal        Current Outpatient Medications   Medication Sig Dispense Refill    atomoxetine (STRATTERA) 60 MG capsule Take 1 capsule by mouth daily 90 capsule 1    valACYclovir (VALTREX) 1 g tablet 2 TABS BY MOUTH EVERY 12 HOURS HOURS X 1 DAY FOR COLD SORES 24 tablet 0    fluticasone (FLONASE) 50 MCG/ACT nasal spray 2 sprays by Each Nostril route daily 3 g 3    Menaquinone-7 (VITAMIN K2 PO) Take 1 tablet by mouth daily      thyroid (ARMOUR) 15 MG tablet Take 1 tablet by mouth daily      RESTASIS 0.05 % ophthalmic emulsion Apply 1 drop to eye 2 times daily  1    Cholecalciferol (VITAMIN D3 PO) Take by mouth       No current facility-administered medications for this visit. PHYSICAL EXAM:   [facility] 11/09/2011 (Exact Date)     The right knee is examined:  Inspection:  Skin is intact. No erythema. Palpation:  No swelling or acute tenderness. Neuro/Vascular/Motor:  Sensation to light touch intact. Capillary refill brisk. No focal motor deficits. DIAGNOSIS:     1. Primary osteoarthritis of right knee        PROCEDURE:   Right Knee Aspiration / Injection Procedure:  Multiple treatment options were discussed. Joint injection was recommended. Details of the procedure, potential risks, and potential benefits were discussed. Patient's questions were answered. Patient elected to proceed with procedure.   Medication: Gelsyn 3 (2.5 mL)  NDC #: [address]-85  Lot #: [id number]  Procedure: Sterile technique was used as the skin over the injection site was prepped with alcohol then the knee joint was then injected through the inferior lateral joint with the above listed medication. A sterile bandage was placed over the injection site. The patient tolerated the procedure well without complication. The patient is scheduled 3rd injection.

## 2022-10-05 NOTE — PATIENT INSTRUCTIONS
Gelsyn 3 # 2  Rest both knees for 24-48 hours  Work on ROM and strengthening of knees and legs   May take NSAIDS or anti-inflammatories as needed  Weightbearing as tolerated  Follow up in one week for the 3rd injection

## 2022-10-13 ENCOUNTER — OFFICE VISIT (OUTPATIENT)
Dept: ORTHOPEDIC SURGERY | Age: 62
End: 2022-10-13
Payer: COMMERCIAL

## 2022-10-13 VITALS
SYSTOLIC BLOOD PRESSURE: 108 MMHG | RESPIRATION RATE: 16 BRPM | HEIGHT: 64 IN | BODY MASS INDEX: 19.63 KG/M2 | HEART RATE: 77 BPM | WEIGHT: 115 LBS | DIASTOLIC BLOOD PRESSURE: 72 MMHG

## 2022-10-13 DIAGNOSIS — M17.11 PRIMARY OSTEOARTHRITIS OF RIGHT KNEE: Primary | ICD-10-CM

## 2022-10-13 PROCEDURE — 20610 DRAIN/INJ JOINT/BURSA W/O US: CPT | Performed by: PHYSICIAN ASSISTANT

## 2022-10-13 NOTE — PROGRESS NOTES
VISCO-SUPPLEMENTATION INJECTION (Number 3)    HISTORY OF PRESENT ILLNESS (HPI):   Alberto Tadeo is a 57y.o. year old female who is here for follow up for visco-supplementation injection number 3 for   1. Primary osteoarthritis of right knee      . PAST HISTORY:   Unless otherwise specified in this note, the past history is reviewed today with the patient and is as follows-    Allergies   Allergen Reactions    Gluten Meal     Seasonal        Current Outpatient Medications   Medication Sig Dispense Refill    atomoxetine (STRATTERA) 60 MG capsule Take 1 capsule by mouth daily 90 capsule 1    valACYclovir (VALTREX) 1 g tablet 2 TABS BY MOUTH EVERY 12 HOURS HOURS X 1 DAY FOR COLD SORES 24 tablet 0    fluticasone (FLONASE) 50 MCG/ACT nasal spray 2 sprays by Each Nostril route daily 3 g 3    Menaquinone-7 (VITAMIN K2 PO) Take 1 tablet by mouth daily      thyroid (ARMOUR) 15 MG tablet Take 1 tablet by mouth daily      RESTASIS 0.05 % ophthalmic emulsion Apply 1 drop to eye 2 times daily  1    Cholecalciferol (VITAMIN D3 PO) Take by mouth       No current facility-administered medications for this visit. PHYSICAL EXAM:   [facility] 11/09/2011 (Exact Date)     The right knee is examined:  Inspection:  Skin is intact. No erythema. Palpation:  No swelling or acute tenderness. Neuro/Vascular/Motor:  Sensation to light touch intact. Capillary refill brisk. No focal motor deficits. DIAGNOSIS:     1. Primary osteoarthritis of right knee          PROCEDURE:   Right Knee Aspiration / Injection Procedure:  Multiple treatment options were discussed. Joint injection was recommended. Details of the procedure, potential risks, and potential benefits were discussed. Patient's questions were answered. Patient elected to proceed with procedure.   Medication: Gelsyn 3 (2.5 mL)  NDC #: [address]-96  Lot #: [id number]  Procedure: Sterile technique was used as the skin over the injection site was prepped with alcohol then the knee joint was then injected through the inferior lateral joint with the above listed medication. A sterile bandage was placed over the injection site. The patient tolerated the procedure well without complication.

## 2022-10-13 NOTE — PATIENT INSTRUCTIONS
Continue to weight bear as tolerated  Continue range of motion   Ice and elevate as needed  Tylenol or motrin for pain   Injection given today in office   Rest for 24-48 hours  Follow up in 6 weeks

## 2022-12-29 ENCOUNTER — OFFICE VISIT (OUTPATIENT)
Dept: ORTHOPEDIC SURGERY | Age: 62
End: 2022-12-29
Payer: COMMERCIAL

## 2022-12-29 VITALS
DIASTOLIC BLOOD PRESSURE: 74 MMHG | SYSTOLIC BLOOD PRESSURE: 128 MMHG | RESPIRATION RATE: 15 BRPM | HEIGHT: 64 IN | WEIGHT: 113 LBS | BODY MASS INDEX: 19.29 KG/M2

## 2022-12-29 DIAGNOSIS — M75.81 RIGHT ROTATOR CUFF TENDINITIS: Primary | ICD-10-CM

## 2022-12-29 DIAGNOSIS — M75.41 IMPINGEMENT SYNDROME, SHOULDER, RIGHT: ICD-10-CM

## 2022-12-29 PROCEDURE — 99213 OFFICE O/P EST LOW 20 MIN: CPT | Performed by: ORTHOPAEDIC SURGERY

## 2022-12-29 PROCEDURE — 3017F COLORECTAL CA SCREEN DOC REV: CPT | Performed by: ORTHOPAEDIC SURGERY

## 2022-12-29 PROCEDURE — 1036F TOBACCO NON-USER: CPT | Performed by: ORTHOPAEDIC SURGERY

## 2022-12-29 PROCEDURE — G8427 DOCREV CUR MEDS BY ELIG CLIN: HCPCS | Performed by: ORTHOPAEDIC SURGERY

## 2022-12-29 PROCEDURE — G8420 CALC BMI NORM PARAMETERS: HCPCS | Performed by: ORTHOPAEDIC SURGERY

## 2022-12-29 PROCEDURE — G8484 FLU IMMUNIZE NO ADMIN: HCPCS | Performed by: ORTHOPAEDIC SURGERY

## 2022-12-29 RX ORDER — THIAMINE MONONITRATE (VIT B1) 100 MG
100 TABLET ORAL DAILY
COMMUNITY

## 2022-12-29 ASSESSMENT — ENCOUNTER SYMPTOMS
CHEST TIGHTNESS: 0
COLOR CHANGE: 0
SHORTNESS OF BREATH: 0

## 2022-12-29 NOTE — PROGRESS NOTES
Patient returns to the office today for FU of the right shoulder. Pt states pain started about 2 months ago. Pt states pain is in the anterior aspect of the right shoulder. Pt states she is having a difficult time raising her arm above her chest. Pt is requesting a steroid injection today.

## 2022-12-29 NOTE — PROGRESS NOTES
12/29/2022   Chief Complaint   Patient presents with    Shoulder Pain     Right shoulder pain        History of Present Illness:                             Ron Oscar is a 60 y.o. female who returns today for follow-up of her right shoulder. She has had problems with the right shoulder over many years. She previously had a shoulder arthroscopy for subacromial decompression and debridement performed in 2015 by Dr. Malinda Hollins. She began having more problems in the shoulder about 6 months ago and was treated appropriately with conservative measures. She had an injection performed that may have given her some pain relief for a few months but subsequently worn off. She complains of aching soreness at the shoulder and pain worse with elevating or reaching forward or overhead. She does have some weakness and occasional painful popping in the shoulder with certain movements or rotational activities. Patient returns to the office today for FU of the right shoulder. Pt states pain started about 2 months ago. Pt states pain is in the anterior aspect of the right shoulder. Pt states she is having a difficult time raising her arm above her chest. Pt is requesting a steroid injection today. Medical History  Patient's medications, allergies, past medical, surgical, social and family histories were reviewed and updated as appropriate.     Past Medical History:   Diagnosis Date    ADD (attention deficit disorder)     Celiac disease     Nausea      Past Surgical History:   Procedure Laterality Date    COLONOSCOPY  2008    COLONOSCOPY  07/27/2018    repeat in 10 years    ENDOSCOPY, COLON, DIAGNOSTIC      PATELLA SURGERY Left 2004    SHOULDER SURGERY Right 2015    [facility]    TONSILLECTOMY      WISDOM TOOTH EXTRACTION       Family History   Problem Relation Age of Onset    Dementia Mother 79        Picks Disease    Celiac Disease Mother     Osteoporosis Mother     Heart Attack Father 68    Cancer Father Lymphoma    Thyroid Disease Sister         Graves    Osteoporosis Sister     Thyroid Disease Sister     Celiac Disease Sister     Rheum Arthritis Sister     Breast Cancer Neg Hx     Ovarian Cancer Neg Hx      Social History     Socioeconomic History    Marital status:      Spouse name: None    Number of children: None    Years of education: None    Highest education level: None   Occupational History    Occupation: OT     Comment: [employer]   Tobacco Use    Smoking status: Never    Smokeless tobacco: Never   Substance and Sexual Activity    Alcohol use: Yes     Alcohol/week: 0.0 standard drinks     Comment: occas    Drug use: No    Sexual activity: Not Currently     Social Determinants of Health     Financial Resource Strain: High Risk    Difficulty of Paying Living Expenses: Very hard   Food Insecurity: No Food Insecurity    Worried About Running Out of Food in the Last Year: Never true    Ran Out of Food in the Last Year: Never true     Current Outpatient Medications   Medication Sig Dispense Refill    vitamin B-1 (THIAMINE) 100 MG tablet Take 100 mg by mouth daily      atomoxetine (STRATTERA) 60 MG capsule Take 1 capsule by mouth daily 90 capsule 1    valACYclovir (VALTREX) 1 g tablet 2 TABS BY MOUTH EVERY 12 HOURS HOURS X 1 DAY FOR COLD SORES 24 tablet 0    fluticasone (FLONASE) 50 MCG/ACT nasal spray 2 sprays by Each Nostril route daily 3 g 3    Menaquinone-7 (VITAMIN K2 PO) Take 1 tablet by mouth daily      thyroid (ARMOUR) 15 MG tablet Take 1 tablet by mouth daily      RESTASIS 0.05 % ophthalmic emulsion Apply 1 drop to eye 2 times daily  1    Cholecalciferol (VITAMIN D3 PO) Take by mouth       No current facility-administered medications for this visit. Allergies   Allergen Reactions    Gluten Meal     Seasonal          Review of Systems   Constitutional:  Negative for activity change and fever. Respiratory:  Negative for chest tightness and shortness of breath.     Cardiovascular: Negative for chest pain. Skin:  Negative for color change. Neurological:  Negative for dizziness. Psychiatric/Behavioral:  The patient is not nervous/anxious. Examination:  General Exam:  Vitals: /74 (Site: Left Upper Arm, Position: Sitting, Cuff Size: Large Adult)   Resp 15   Ht 5' 3.5\" (1.613 m)   Wt 113 lb (51.3 kg)   LMP 11/09/2011 (Exact Date)   BMI 19.70 kg/m²    Physical Exam  Vitals and nursing note reviewed. Constitutional:       Appearance: Normal appearance. HENT:      Head: Normocephalic and atraumatic. Eyes:      Conjunctiva/sclera: Conjunctivae normal.      Pupils: Pupils are equal, round, and reactive to light. Pulmonary:      Effort: Pulmonary effort is normal.   Musculoskeletal:      Left shoulder: No swelling, deformity, laceration, tenderness or bony tenderness. Normal range of motion. Normal strength. Normal pulse. Right elbow: No swelling, deformity, effusion or lacerations. Normal range of motion. No tenderness. Left elbow: Normal.      Cervical back: Normal range of motion. Comments: Right Upper Extremity:    There is mild global tenderness throughout the shoulder most significant at the anterior lateral aspect. There is mildly restricted range of motion of the shoulder with pain at the extremes of forward elevation and abduction. Forward elevation 140 degrees, abduction 120 degrees, internal rotation to L5, external rotation 30 degrees. Strength is 5/5 with rotator cuff testing but there is breakaway due to pain. Positive empty can sign. Negative drop arm sign. Positive Neer sign. Positive Lemus sign. Mild pain at the acromioclavicular joint with crossarm test.  No crepitation. Mild clicking at the shoulder with overhead rotational motion. Skin is intact. Sensation is intact throughout the upper extremity. No instability with passive motion of the shoulder. 2+ radial pulse. No edema.   No muscle atrophy. Normal scapular motion. Well-healed arthroscopic scars       Skin:     General: Skin is warm and dry. Neurological:      Mental Status: She is alert and oriented to person, place, and time. Psychiatric:         Mood and Affect: Mood normal.         Behavior: Behavior normal.          Diagnostic testing:  X-ray images were reviewed by myself and discussed with the patient:  X-ray images of the right shoulder from 6/29/2022 were reviewed by myself and discussed the patient: Next  Narrative   X-ray 3 views Right Shoulder:       X-rays show no evidence of fracture or degenerative process. Normal bone    density. Normal alignment. No abnormal calcifications or soft tissue    swelling. Impression: Normal x-ray           Office Procedures:  Orders Placed This Encounter   Procedures    MRI SHOULDER RIGHT WO CONTRAST     Standing Status:   Future     Standing Expiration Date:   12/29/2023       Assessment and Plan  1. right shoulder rotator cuff tendinitis and impingement syndrome, possible rotator cuff tear    She has had ongoing issues with the right shoulder despite appropriate conservative measures including physical therapy, acupuncture, medications, and injections. She also has a history of previous problems and surgery to the right shoulder. Based on the history and my physical exam, I have a high index of suspicion for a tear of the rotator cuff. I feel it is medically necessary to obtain an MRI to evaluate for tear of the rotator cuff and other intra-articular pathology such injury to the labrum, biceps tendon, and cartilage surfaces of the shoulder. I have instructed the patient on gentle range of motion exercises for the shoulder and avoidance of lifting pushing and pulling with that arm. They will follow-up after the MRI for a review of the results.      Electronically signed by Geoffrey Dozier MD on 12/29/2022 at 2:13 PM

## 2022-12-29 NOTE — PATIENT INSTRUCTIONS
Continue weight-bearing as tolerated. Continue range of motion exercises as instructed. Ice and elevate as needed. Tylenol or Motrin for pain. Central scheduling [phone] will be calling you to schedule your MRI. If you do not hear from them with in a week give them a call.

## 2023-01-14 ENCOUNTER — HOSPITAL ENCOUNTER (OUTPATIENT)
Dept: MRI IMAGING | Age: 63
Discharge: HOME | End: 2023-01-14
Payer: COMMERCIAL

## 2023-01-14 DIAGNOSIS — M75.81 RIGHT ROTATOR CUFF TENDINITIS: ICD-10-CM

## 2023-01-14 DIAGNOSIS — M75.41 IMPINGEMENT SYNDROME, SHOULDER, RIGHT: ICD-10-CM

## 2023-01-14 PROCEDURE — 73221 MRI JOINT UPR EXTREM W/O DYE: CPT

## 2023-02-01 ENCOUNTER — TELEMEDICINE (OUTPATIENT)
Dept: FAMILY MEDICINE CLINIC | Age: 63
End: 2023-02-01
Payer: COMMERCIAL

## 2023-02-01 DIAGNOSIS — F98.8 ATTENTION DEFICIT DISORDER (ADD) WITHOUT HYPERACTIVITY: Primary | ICD-10-CM

## 2023-02-01 DIAGNOSIS — L60.8 NAIL DISCOLORATION: ICD-10-CM

## 2023-02-01 PROCEDURE — 99213 OFFICE O/P EST LOW 20 MIN: CPT | Performed by: FAMILY MEDICINE

## 2023-02-01 PROCEDURE — G8427 DOCREV CUR MEDS BY ELIG CLIN: HCPCS | Performed by: FAMILY MEDICINE

## 2023-02-01 PROCEDURE — 3017F COLORECTAL CA SCREEN DOC REV: CPT | Performed by: FAMILY MEDICINE

## 2023-02-01 RX ORDER — ATOMOXETINE 60 MG/1
60 CAPSULE ORAL DAILY
Qty: 90 CAPSULE | Refills: 1 | Status: SHIPPED | OUTPATIENT
Start: 2023-02-01

## 2023-02-01 ASSESSMENT — PATIENT HEALTH QUESTIONNAIRE - PHQ9
SUM OF ALL RESPONSES TO PHQ QUESTIONS 1-9: 0
SUM OF ALL RESPONSES TO PHQ QUESTIONS 1-9: 0
1. LITTLE INTEREST OR PLEASURE IN DOING THINGS: 0
SUM OF ALL RESPONSES TO PHQ9 QUESTIONS 1 & 2: 0
SUM OF ALL RESPONSES TO PHQ QUESTIONS 1-9: 0
2. FEELING DOWN, DEPRESSED OR HOPELESS: 0
SUM OF ALL RESPONSES TO PHQ QUESTIONS 1-9: 0

## 2023-02-01 NOTE — PROGRESS NOTES
2023    TELEHEALTH EVALUATION -- Audio/Visual (During LBMSG-38 public health emergency)    HPI:    Annalise Gupta (:  1960) has requested an audio/video evaluation for the following concern(s):        ADD: Strattera helps. Takes her medication every day and through the year. Has more difficulty focusing without it. Gives her energy    Nail problem: surprised nail culture did not come back positive for fungus. Nail is still very thick. Admits that maybe the clippings she submitted were not that good. Had treatment from podiatrist that helped in the past.  Remembers that it took 2 years for the new non infected nail to grow out. But soon nail got discolored again    Review of Systems    Prior to Visit Medications    Medication Sig Taking? Authorizing Provider   atomoxetine (STRATTERA) 60 MG capsule Take 1 capsule by mouth daily Yes Nitin Lenz MD   valACYclovir (VALTREX) 1 g tablet 2 TABS BY MOUTH EVERY 12 HOURS HOURS X 1 DAY FOR COLD SORES Yes Nitin Lenz MD   Menaquinone-7 (VITAMIN K2 PO) Take 1 tablet by mouth daily Yes Historical Provider, MD   thyroid (ARMOUR) 15 MG tablet Take 1 tablet by mouth daily Yes Historical Provider, MD   RESTASIS 0.05 % ophthalmic emulsion Apply 1 drop to eye 2 times daily Yes Historical Provider, MD   Cholecalciferol (VITAMIN D3 PO) Take by mouth Yes Historical Provider, MD   vitamin B-1 (THIAMINE) 100 MG tablet Take 100 mg by mouth daily  Patient not taking: Reported on 2023  Historical Provider, MD   fluticasone (FLONASE) 50 MCG/ACT nasal spray 2 sprays by Each Nostril route daily  Patient not taking: Reported on 2023  Nitin Lenz MD       Social History     Tobacco Use    Smoking status: Never    Smokeless tobacco: Never   Substance Use Topics    Alcohol use:  Yes     Alcohol/week: 0.0 standard drinks     Comment: occas    Drug use: No        Allergies   Allergen Reactions    Gluten Meal     Seasonal    ,   Past Medical History:   Diagnosis Date ADD (attention deficit disorder)     Celiac disease     Nausea        PHYSICAL EXAMINATION:  [ INSTRUCTIONS:  \"[x]\" Indicates a positive item  \"[]\" Indicates a negative item  -- DELETE ALL ITEMS NOT EXAMINED]  Vital Signs: (As obtained by patient/caregiver or practitioner observation)    Blood pressure-  Heart rate-    Respiratory rate-    Temperature-  Pulse oximetry-     Constitutional: [x] Appears well-developed and well-nourished [x] No apparent distress      [] Abnormal-   Mental status  [x] Alert and awake  [x] Oriented to person/place/time [x]Able to follow commands      Eyes:  EOM    []  Normal  [] Abnormal-  Sclera  []  Normal  [] Abnormal -         Discharge []  None visible  [] Abnormal -    HENT:   [x] Normocephalic, atraumatic. [] Abnormal   [] Mouth/Throat: Mucous membranes are moist.     External Ears [x] Normal  [] Abnormal-     Neck: [x] No visualized mass     Pulmonary/Chest: [] Respiratory effort normal.  [x] No visualized signs of difficulty breathing or respiratory distress        [] Abnormal-      Musculoskeletal:   [x] Normal gait with no signs of ataxia         [] Normal range of motion of neck        [] Abnormal-       Neurological:        [x] No Facial Asymmetry (Cranial nerve 7 motor function) (limited exam to video visit)          [] No gaze palsy        [] Abnormal-         Skin:        [x] No significant exanthematous lesions or discoloration noted on facial skin         [] Abnormal-            Psychiatric:       [x] Normal Affect [] No Hallucinations        [] Abnormal-     Other pertinent observable physical exam findings-      Diagnosis Orders   1. Attention deficit disorder (ADD) without hyperactivity  atomoxetine (STRATTERA) 60 MG capsule      2. Nail discoloration          Continue Strattera for ADD    She can submit nail clippings again in about a month with MA    Return in about 6 months (around 8/7/2023) for ADD 9:00.     Evelio Mckinney, was evaluated through a synchronous (real-time) audio-video encounter. The patient (or guardian if applicable) is aware that this is a billable service, which includes applicable co-pays. This Virtual Visit was conducted with patient's (and/or legal guardian's) consent. The visit was conducted pursuant to the emergency declaration under the [facility], [address] [place] [address] authority and the Jourdan Crowdzu and [employer] [facility] [employer] Act. Patient identification was verified, and a caregiver was present when appropriate. The patient was located at Home: [address]  Provider was located at Home ([facility]): [place]        Total time spent on this encounter: Not billed by time    --Antonia Aaron MD on 2/1/2023 at 8:18 PM    An electronic signature was used to authenticate this note.

## 2023-02-28 ENCOUNTER — OFFICE VISIT (OUTPATIENT)
Dept: ORTHOPEDIC SURGERY | Age: 63
End: 2023-02-28
Payer: COMMERCIAL

## 2023-02-28 VITALS
RESPIRATION RATE: 15 BRPM | OXYGEN SATURATION: 97 % | WEIGHT: 115 LBS | HEART RATE: 88 BPM | HEIGHT: 61 IN | BODY MASS INDEX: 21.71 KG/M2

## 2023-02-28 DIAGNOSIS — M54.50 CHRONIC RIGHT-SIDED LOW BACK PAIN, UNSPECIFIED WHETHER SCIATICA PRESENT: ICD-10-CM

## 2023-02-28 DIAGNOSIS — M75.81 RIGHT ROTATOR CUFF TENDINITIS: Primary | ICD-10-CM

## 2023-02-28 DIAGNOSIS — G89.29 CHRONIC RIGHT-SIDED LOW BACK PAIN, UNSPECIFIED WHETHER SCIATICA PRESENT: ICD-10-CM

## 2023-02-28 PROCEDURE — 1036F TOBACCO NON-USER: CPT | Performed by: ORTHOPAEDIC SURGERY

## 2023-02-28 PROCEDURE — G8484 FLU IMMUNIZE NO ADMIN: HCPCS | Performed by: ORTHOPAEDIC SURGERY

## 2023-02-28 PROCEDURE — G8427 DOCREV CUR MEDS BY ELIG CLIN: HCPCS | Performed by: ORTHOPAEDIC SURGERY

## 2023-02-28 PROCEDURE — 3017F COLORECTAL CA SCREEN DOC REV: CPT | Performed by: ORTHOPAEDIC SURGERY

## 2023-02-28 PROCEDURE — G8420 CALC BMI NORM PARAMETERS: HCPCS | Performed by: ORTHOPAEDIC SURGERY

## 2023-02-28 PROCEDURE — 99214 OFFICE O/P EST MOD 30 MIN: CPT | Performed by: ORTHOPAEDIC SURGERY

## 2023-02-28 ASSESSMENT — ENCOUNTER SYMPTOMS
SHORTNESS OF BREATH: 0
CHEST TIGHTNESS: 0
EYE PAIN: 0
BACK PAIN: 1
WHEEZING: 0
EYE REDNESS: 0
VOMITING: 0
COLOR CHANGE: 0

## 2023-02-28 NOTE — PATIENT INSTRUCTIONS
Continue weight-bearing as tolerated. Continue range of motion exercises as instructed. Ice and elevate as needed. Tylenol or Motrin for pain.    Follow up as needed  Physical therapy

## 2023-02-28 NOTE — PROGRESS NOTES
2/28/2023   Chief Complaint   Patient presents with    Shoulder Pain     Right shoulder MRI        History of Present Illness:                             Darin Little is a 60 y.o. female who returns today for follow-up of her right shoulder following recent MRI. She continues to have discomfort and pain in the shoulder feels like her muscles are tight along the shoulder blade and at the base of her neck. She has pain with certain movements especially overhead or rolling on that side. She denies any severe mechanical issues or instability events or swelling. Patient returns to the office today for FU of the right shoulder MRI. Pt states her shoulder is just uncomfortable. Pt states she does have some difficulty working out due to the pain. Medical History  Patient's medications, allergies, past medical, surgical, social and family histories were reviewed and updated as appropriate.     Past Medical History:   Diagnosis Date    ADD (attention deficit disorder)     Celiac disease     Nausea      Past Surgical History:   Procedure Laterality Date    COLONOSCOPY  2008    COLONOSCOPY  07/27/2018    repeat in 10 years    ENDOSCOPY, COLON, DIAGNOSTIC      PATELLA SURGERY Left 2004    SHOULDER SURGERY Right 2015    Fester    TONSILLECTOMY      WISDOM TOOTH EXTRACTION       Family History   Problem Relation Age of Onset    Dementia Mother 79        Picks Disease    Celiac Disease Mother     Osteoporosis Mother     Heart Attack Father 68    Cancer Father         Lymphoma    Thyroid Disease Sister         Graves    Osteoporosis Sister     Thyroid Disease Sister     Celiac Disease Sister     Rheum Arthritis Sister     Breast Cancer Neg Hx     Ovarian Cancer Neg Hx      Social History     Socioeconomic History    Marital status:      Spouse name: None    Number of children: None    Years of education: None    Highest education level: None   Occupational History    Occupation: OT     Comment: [employer]   Tobacco Use    Smoking status: Never    Smokeless tobacco: Never   Substance and Sexual Activity    Alcohol use: Yes     Alcohol/week: 0.0 standard drinks     Comment: occas    Drug use: No    Sexual activity: Not Currently     Social Determinants of Health     Financial Resource Strain: High Risk    Difficulty of Paying Living Expenses: Very hard   Food Insecurity: No Food Insecurity    Worried About Running Out of Food in the Last Year: Never true    Ran Out of Food in the Last Year: Never true     Current Outpatient Medications   Medication Sig Dispense Refill    atomoxetine (STRATTERA) 60 MG capsule Take 1 capsule by mouth daily 90 capsule 1    vitamin B-1 (THIAMINE) 100 MG tablet Take 100 mg by mouth daily (Patient not taking: Reported on 2/1/2023)      valACYclovir (VALTREX) 1 g tablet 2 TABS BY MOUTH EVERY 12 HOURS HOURS X 1 DAY FOR COLD SORES 24 tablet 0    fluticasone (FLONASE) 50 MCG/ACT nasal spray 2 sprays by Each Nostril route daily (Patient not taking: Reported on 2/1/2023) 3 g 3    Menaquinone-7 (VITAMIN K2 PO) Take 1 tablet by mouth daily      thyroid (ARMOUR) 15 MG tablet Take 1 tablet by mouth daily      RESTASIS 0.05 % ophthalmic emulsion Apply 1 drop to eye 2 times daily  1    Cholecalciferol (VITAMIN D3 PO) Take by mouth       No current facility-administered medications for this visit. Allergies   Allergen Reactions    Gluten Meal     Seasonal          Review of Systems   Constitutional:  Negative for chills and fever. HENT:  Negative for congestion and sneezing. Eyes:  Negative for pain and redness. Respiratory:  Negative for chest tightness, shortness of breath and wheezing. Cardiovascular:  Negative for chest pain and palpitations. Gastrointestinal:  Negative for vomiting. Musculoskeletal:  Positive for arthralgias and back pain. Skin:  Negative for color change and rash. Neurological:  Negative for weakness and numbness.    Psychiatric/Behavioral: Negative for agitation. The patient is not nervous/anxious. Examination:  General Exam:  Vitals: Pulse 88   Resp 15   Ht 5' 1\" (1.549 m)   Wt 115 lb (52.2 kg)   LMP 11/09/2011 (Exact Date)   SpO2 97%   BMI 21.73 kg/m²    Physical Exam     Right upper extremity:  Mild tenderness to palpation diffusely throughout the soft tissues of the shoulder involving the anterior posterior joint as well as the superior and medial scapular border as well as the trapezial muscles at the base of the neck. No instability to passive motion and stress across the shoulder joint. Rotator cuff is functioning well against gravity and light resistance however this does refer pain to the posterior and superior aspect of the shoulder. Near full range of motion with discomfort at the extremes of internal rotation and forward elevation. Sensation and motor function is intact in median, ulnar, radial nerve distributions    Diagnostic testing:    MRI images of the right shoulder were reviewed by myself and discussed with the patient today:  MRI of the right shoulder completed on 1/14/23  Impression   1. Normal shoulder alignment with no significant arthropathy. 2. The labrum and biceps tendon are normal.   3. Mild supraspinatus and infraspinatus tendinopathy. Mild supraspinatus   tendinitis. No evidence of a rotator cuff tear. Office Procedures:  Orders Placed This Encounter   Procedures    [facility]     Referral Priority:   Routine     Referral Type:   Eval and Treat     Referral Reason:   Specialty Services Required     Requested Specialty:   Physical Therapist     Number of Visits Requested:   1       Assessment and Plan  1. Right shoulder rotator cuff tendinitis    2.   Chronic low back pain right-sided    I reviewed the MRI findings with the patient and have reassured her that there is no evidence of rotator cuff tear or structural pathology on the MRI. We discussed the mild inflammatory findings consistent with rotator cuff tendinitis. I recommended maximizing conservative treatments. We discussed the use of steroid injections on a limited basis for severe symptoms. She does not require an injection today. I have sent a referral to physical therapy to help with shoulder rehabilitation activities and dry needling. She can advance her activities as tolerated and follow-up as needed if symptoms progress or worsen. We may consider steroid injection in the future. She also inquired about her chronic low back pain. This is worse with sitting on her bicycle seat for prolonged period of time. I have recommended stretching exercises and yoga activities to help with core strengthening and back and hip flexibility. I have asked her to discuss this further with her physical therapist and chiropractor as well. I have reassured her on my exam today that there is no evidence of severe pathology and she can follow-up as needed if symptoms are worsening over time.     Electronically signed by Elder Huang MD on 2/28/2023 at 5:10 PM

## 2023-02-28 NOTE — PROGRESS NOTES
Patient returns to the office today for FU of the right shoulder MRI. Pt states her shoulder is just uncomfortable. Pt states she does have some difficulty working out due to the pain. MRI of the right shoulder completed on 1/14/23  Impression   1. Normal shoulder alignment with no significant arthropathy. 2. The labrum and biceps tendon are normal.   3. Mild supraspinatus and infraspinatus tendinopathy. Mild supraspinatus   tendinitis. No evidence of a rotator cuff tear.        RECOMMENDATIONS:   Unavailable

## 2023-05-02 ENCOUNTER — TELEPHONE (OUTPATIENT)
Dept: ORTHOPEDIC SURGERY | Age: 63
End: 2023-05-02

## 2023-07-04 DIAGNOSIS — F98.8 ATTENTION DEFICIT DISORDER (ADD) WITHOUT HYPERACTIVITY: ICD-10-CM

## 2023-07-05 RX ORDER — ATOMOXETINE 60 MG/1
CAPSULE ORAL
Qty: 90 CAPSULE | Refills: 1 | OUTPATIENT
Start: 2023-07-05

## 2023-07-14 DIAGNOSIS — F98.8 ATTENTION DEFICIT DISORDER (ADD) WITHOUT HYPERACTIVITY: ICD-10-CM

## 2023-07-14 RX ORDER — ATOMOXETINE 60 MG/1
CAPSULE ORAL
Qty: 90 CAPSULE | Refills: 0 | Status: SHIPPED | OUTPATIENT
Start: 2023-07-14 | End: 2023-08-04 | Stop reason: SDUPTHER

## 2023-08-04 ENCOUNTER — OFFICE VISIT (OUTPATIENT)
Dept: FAMILY MEDICINE CLINIC | Age: 63
End: 2023-08-04
Payer: COMMERCIAL

## 2023-08-04 VITALS
BODY MASS INDEX: 20.56 KG/M2 | HEART RATE: 71 BPM | DIASTOLIC BLOOD PRESSURE: 70 MMHG | OXYGEN SATURATION: 93 % | WEIGHT: 108.8 LBS | SYSTOLIC BLOOD PRESSURE: 118 MMHG

## 2023-08-04 DIAGNOSIS — R73.02 IMPAIRED GLUCOSE TOLERANCE: Primary | ICD-10-CM

## 2023-08-04 DIAGNOSIS — B00.9 HSV-1 (HERPES SIMPLEX VIRUS 1) INFECTION: ICD-10-CM

## 2023-08-04 DIAGNOSIS — F98.8 ATTENTION DEFICIT DISORDER (ADD) WITHOUT HYPERACTIVITY: ICD-10-CM

## 2023-08-04 DIAGNOSIS — L65.9 HAIR THINNING: ICD-10-CM

## 2023-08-04 DIAGNOSIS — R29.890 HEIGHT LOSS: ICD-10-CM

## 2023-08-04 DIAGNOSIS — Z13.220 SCREENING CHOLESTEROL LEVEL: ICD-10-CM

## 2023-08-04 DIAGNOSIS — Z78.0 POST-MENOPAUSAL: ICD-10-CM

## 2023-08-04 DIAGNOSIS — L60.8 DISCOLORED NAILS: ICD-10-CM

## 2023-08-04 LAB
ALBUMIN SERPL-MCNC: 4.4 G/DL (ref 3.4–5)
ALBUMIN/GLOB SERPL: 2 {RATIO} (ref 1.1–2.2)
ALP SERPL-CCNC: 34 U/L (ref 40–129)
ALT SERPL-CCNC: 15 U/L (ref 10–40)
ANION GAP SERPL CALCULATED.3IONS-SCNC: 9 MMOL/L (ref 3–16)
AST SERPL-CCNC: 28 U/L (ref 15–37)
BASOPHILS # BLD: 0 K/UL (ref 0–0.2)
BASOPHILS NFR BLD: 0.8 %
BILIRUB SERPL-MCNC: <0.2 MG/DL (ref 0–1)
BUN SERPL-MCNC: 14 MG/DL (ref 7–20)
CALCIUM SERPL-MCNC: 9.5 MG/DL (ref 8.3–10.6)
CHLORIDE SERPL-SCNC: 104 MMOL/L (ref 99–110)
CHOLEST SERPL-MCNC: 200 MG/DL (ref 0–199)
CO2 SERPL-SCNC: 27 MMOL/L (ref 21–32)
CREAT SERPL-MCNC: 0.6 MG/DL (ref 0.6–1.2)
DEPRECATED RDW RBC AUTO: 13.9 % (ref 12.4–15.4)
EOSINOPHIL # BLD: 0.3 K/UL (ref 0–0.6)
EOSINOPHIL NFR BLD: 4.5 %
GFR SERPLBLD CREATININE-BSD FMLA CKD-EPI: >60 ML/MIN/{1.73_M2}
GLUCOSE SERPL-MCNC: 96 MG/DL (ref 70–99)
HCT VFR BLD AUTO: 40.9 % (ref 36–48)
HDLC SERPL-MCNC: 76 MG/DL (ref 40–60)
HGB BLD-MCNC: 13.6 G/DL (ref 12–16)
LDLC SERPL CALC-MCNC: 108 MG/DL
LYMPHOCYTES # BLD: 1.1 K/UL (ref 1–5.1)
LYMPHOCYTES NFR BLD: 16.6 %
MCH RBC QN AUTO: 30.5 PG (ref 26–34)
MCHC RBC AUTO-ENTMCNC: 33.2 G/DL (ref 31–36)
MCV RBC AUTO: 92 FL (ref 80–100)
MONOCYTES # BLD: 0.5 K/UL (ref 0–1.3)
MONOCYTES NFR BLD: 8.2 %
NEUTROPHILS # BLD: 4.4 K/UL (ref 1.7–7.7)
NEUTROPHILS NFR BLD: 69.9 %
PLATELET # BLD AUTO: 308 K/UL (ref 135–450)
PMV BLD AUTO: 9.9 FL (ref 5–10.5)
POTASSIUM SERPL-SCNC: 5.1 MMOL/L (ref 3.5–5.1)
PROT SERPL-MCNC: 6.6 G/DL (ref 6.4–8.2)
RBC # BLD AUTO: 4.45 M/UL (ref 4–5.2)
SODIUM SERPL-SCNC: 140 MMOL/L (ref 136–145)
TRIGL SERPL-MCNC: 82 MG/DL (ref 0–150)
VLDLC SERPL CALC-MCNC: 16 MG/DL
WBC # BLD AUTO: 6.3 K/UL (ref 4–11)

## 2023-08-04 PROCEDURE — 99214 OFFICE O/P EST MOD 30 MIN: CPT | Performed by: FAMILY MEDICINE

## 2023-08-04 PROCEDURE — 36415 COLL VENOUS BLD VENIPUNCTURE: CPT | Performed by: FAMILY MEDICINE

## 2023-08-04 PROCEDURE — 3017F COLORECTAL CA SCREEN DOC REV: CPT | Performed by: FAMILY MEDICINE

## 2023-08-04 PROCEDURE — G8420 CALC BMI NORM PARAMETERS: HCPCS | Performed by: FAMILY MEDICINE

## 2023-08-04 PROCEDURE — 1036F TOBACCO NON-USER: CPT | Performed by: FAMILY MEDICINE

## 2023-08-04 PROCEDURE — G8427 DOCREV CUR MEDS BY ELIG CLIN: HCPCS | Performed by: FAMILY MEDICINE

## 2023-08-04 RX ORDER — VALACYCLOVIR HYDROCHLORIDE 1 G/1
TABLET, FILM COATED ORAL
Qty: 40 TABLET | Refills: 0 | Status: SHIPPED | OUTPATIENT
Start: 2023-08-04

## 2023-08-04 RX ORDER — ATOMOXETINE 60 MG/1
60 CAPSULE ORAL DAILY
Qty: 90 CAPSULE | Refills: 3 | Status: SHIPPED | OUTPATIENT
Start: 2023-08-04

## 2023-08-04 NOTE — PATIENT INSTRUCTIONS
Patient Education        Preventing Osteoporosis: Care Instructions  Your Care Instructions     Osteoporosis means the bones are weak and thin enough that they can break easily. The older you are, the more likely you are to get osteoporosis. But with plenty of calcium, vitamin D, and exercise, you can help prevent osteoporosis. The preteen and teen years are a key time for bone building. With the help of calcium, vitamin D, and exercise in those early years and beyond, the bones reach their peak density and strength by age 27. After age 27, your bones naturally start to thin and weaken. The stronger your bones are at around age 27, the lower your risk for osteoporosis. But no matter what your age and risk are, your bones still need calcium, vitamin D, and exercise to stay strong. Also avoid smoking, and limit alcohol. Smoking and heavy alcohol use can make your bones thinner. Talk to your doctor about any special risks you might have, such as having a close relative with osteoporosis or taking a medicine that can weaken bones. Your doctor can tell you the best ways to protect your bones from thinning. Follow-up care is a key part of your treatment and safety. Be sure to make and go to all appointments, and call your doctor if you are having problems. It's also a good idea to know your test results and keep a list of the medicines you take. How can you care for yourself at home? Here are some things you can do to build and strengthen your bones:  Get enough calcium and vitamin D. Eat foods rich in calcium, like yogurt, cheese, milk, and dark green vegetables. Eat foods rich in vitamin D, like eggs, fatty fish, cereal, and fortified milk. Get some sunshine. Your body uses sunshine to make its own vitamin D. Talk to your doctor about taking a calcium plus vitamin D supplement if you don't think you are getting enough through your diet and sunshine. Get regular bone-building exercise.  Walking, jogging,

## 2023-08-05 LAB — KOH PREP SPEC: NORMAL

## 2023-08-14 LAB
FUNGUS SPEC CULT: NORMAL
KOH PREP SPEC: NORMAL

## 2023-08-18 ENCOUNTER — HOSPITAL ENCOUNTER (OUTPATIENT)
Dept: WOMENS IMAGING | Age: 63
Discharge: HOME | End: 2023-08-18
Attending: FAMILY MEDICINE
Payer: COMMERCIAL

## 2023-08-18 DIAGNOSIS — Z78.0 POST-MENOPAUSAL: ICD-10-CM

## 2023-08-18 DIAGNOSIS — R29.890 HEIGHT LOSS: ICD-10-CM

## 2023-08-18 PROCEDURE — 77080 DXA BONE DENSITY AXIAL: CPT

## 2023-08-21 LAB
FUNGUS SPEC CULT: NORMAL
KOH PREP SPEC: NORMAL

## 2023-08-22 PROBLEM — M81.0 AGE-RELATED OSTEOPOROSIS WITHOUT CURRENT PATHOLOGICAL FRACTURE: Status: ACTIVE | Noted: 2023-08-22

## 2023-08-24 ENCOUNTER — TELEMEDICINE (OUTPATIENT)
Dept: FAMILY MEDICINE CLINIC | Age: 63
End: 2023-08-24
Payer: COMMERCIAL

## 2023-08-24 DIAGNOSIS — M81.0 AGE-RELATED OSTEOPOROSIS WITHOUT CURRENT PATHOLOGICAL FRACTURE: Primary | ICD-10-CM

## 2023-08-24 PROCEDURE — 3017F COLORECTAL CA SCREEN DOC REV: CPT | Performed by: FAMILY MEDICINE

## 2023-08-24 PROCEDURE — 99213 OFFICE O/P EST LOW 20 MIN: CPT | Performed by: FAMILY MEDICINE

## 2023-08-24 PROCEDURE — G8428 CUR MEDS NOT DOCUMENT: HCPCS | Performed by: FAMILY MEDICINE

## 2023-08-24 RX ORDER — ALENDRONATE SODIUM 70 MG/1
70 TABLET ORAL
Qty: 13 TABLET | Refills: 3 | Status: SHIPPED | OUTPATIENT
Start: 2023-08-24

## 2023-08-24 SDOH — ECONOMIC STABILITY: INCOME INSECURITY: HOW HARD IS IT FOR YOU TO PAY FOR THE VERY BASICS LIKE FOOD, HOUSING, MEDICAL CARE, AND HEATING?: NOT HARD AT ALL

## 2023-08-24 SDOH — ECONOMIC STABILITY: HOUSING INSECURITY
IN THE LAST 12 MONTHS, WAS THERE A TIME WHEN YOU DID NOT HAVE A STEADY PLACE TO SLEEP OR SLEPT IN A SHELTER (INCLUDING NOW)?: NO

## 2023-08-24 SDOH — ECONOMIC STABILITY: FOOD INSECURITY: WITHIN THE PAST 12 MONTHS, THE FOOD YOU BOUGHT JUST DIDN'T LAST AND YOU DIDN'T HAVE MONEY TO GET MORE.: NEVER TRUE

## 2023-08-24 SDOH — ECONOMIC STABILITY: FOOD INSECURITY: WITHIN THE PAST 12 MONTHS, YOU WORRIED THAT YOUR FOOD WOULD RUN OUT BEFORE YOU GOT MONEY TO BUY MORE.: NEVER TRUE

## 2023-08-24 SDOH — ECONOMIC STABILITY: TRANSPORTATION INSECURITY
IN THE PAST 12 MONTHS, HAS LACK OF TRANSPORTATION KEPT YOU FROM MEETINGS, WORK, OR FROM GETTING THINGS NEEDED FOR DAILY LIVING?: NO

## 2023-08-24 NOTE — PROGRESS NOTES
service, which includes applicable co-pays. This Virtual Visit was conducted with patient's (and/or legal guardian's) consent. Patient identification was verified, and a caregiver was present when appropriate. The patient was located at Home: [address]  [address]  Provider was located at Home ([address]): [place]! Confirm you are appropriately licensed, registered, or certified to deliver care in the state where the patient is located as indicated above. If you are not or unsure, please re-schedule the visit. Are you appropriately licensed in the patient's state?: Yes    TAke supplemental calcium and vitamin D (Caltrate D or Oscal D). Eat foods rich in calcium such as milk, cheese, yogert, and dark green vegetables. Recommend walking up to 30 minutes per day. Encourage weight bearing exercises with 3-5 lb weights. Total time spent on this encounter: Not billed by time    --Sameer Carmen MD on 8/24/2023 at 4:39 PM    An electronic signature was used to authenticate this note.

## 2023-08-28 LAB
FUNGUS SPEC CULT: NORMAL
KOH PREP SPEC: NORMAL

## 2023-08-30 ENCOUNTER — NURSE ONLY (OUTPATIENT)
Dept: ORTHOPEDIC SURGERY | Age: 63
End: 2023-08-30

## 2023-08-30 VITALS
BODY MASS INDEX: 20.39 KG/M2 | RESPIRATION RATE: 16 BRPM | OXYGEN SATURATION: 98 % | HEIGHT: 61 IN | WEIGHT: 108 LBS | HEART RATE: 74 BPM

## 2023-08-30 DIAGNOSIS — M17.11 PRIMARY OSTEOARTHRITIS OF RIGHT KNEE: Primary | ICD-10-CM

## 2023-08-30 DIAGNOSIS — M17.12 PRIMARY OSTEOARTHRITIS OF LEFT KNEE: ICD-10-CM

## 2023-08-30 NOTE — PATIENT INSTRUCTIONS
Elsie 3 # 1  Rest both knees for 24-48 hours  Work on ROM and strengthening of knees and legs   May take NSAIDS or anti-inflammatories as needed  Weightbearing as tolerated  Follow up in one week for the 2nd injection    We are committed to providing you the best care possible. If you receive a survey after visiting one of our offices, please take time to share your experience concerning your physician office visit. These surveys are confidential and no health information about you is shared. We are eager to improve for you and we are counting on your feedback to help make that happen.

## 2023-09-04 LAB
FUNGUS SPEC CULT: NORMAL
KOH PREP SPEC: NORMAL

## 2023-09-06 ENCOUNTER — NURSE ONLY (OUTPATIENT)
Dept: ORTHOPEDIC SURGERY | Age: 63
End: 2023-09-06
Payer: COMMERCIAL

## 2023-09-06 VITALS
HEIGHT: 63 IN | OXYGEN SATURATION: 97 % | HEART RATE: 70 BPM | RESPIRATION RATE: 12 BRPM | WEIGHT: 108 LBS | BODY MASS INDEX: 19.14 KG/M2

## 2023-09-06 DIAGNOSIS — M17.11 PRIMARY OSTEOARTHRITIS OF RIGHT KNEE: ICD-10-CM

## 2023-09-06 DIAGNOSIS — M17.12 PRIMARY OSTEOARTHRITIS OF LEFT KNEE: Primary | ICD-10-CM

## 2023-09-06 PROCEDURE — 20610 DRAIN/INJ JOINT/BURSA W/O US: CPT | Performed by: PHYSICIAN ASSISTANT

## 2023-09-06 PROCEDURE — 99999 PR OFFICE/OUTPT VISIT,PROCEDURE ONLY: CPT | Performed by: PHYSICIAN ASSISTANT

## 2023-09-07 ENCOUNTER — IMMUNIZATION (OUTPATIENT)
Dept: FAMILY MEDICINE CLINIC | Age: 63
End: 2023-09-07
Payer: COMMERCIAL

## 2023-09-07 DIAGNOSIS — Z23 NEED FOR INFLUENZA VACCINATION: Primary | ICD-10-CM

## 2023-09-07 PROCEDURE — 90674 CCIIV4 VAC NO PRSV 0.5 ML IM: CPT | Performed by: FAMILY MEDICINE

## 2023-09-07 PROCEDURE — 90471 IMMUNIZATION ADMIN: CPT | Performed by: FAMILY MEDICINE

## 2023-09-13 ENCOUNTER — NURSE ONLY (OUTPATIENT)
Dept: ORTHOPEDIC SURGERY | Age: 63
End: 2023-09-13

## 2023-09-13 VITALS
OXYGEN SATURATION: 97 % | RESPIRATION RATE: 16 BRPM | WEIGHT: 107 LBS | BODY MASS INDEX: 18.96 KG/M2 | HEART RATE: 68 BPM | HEIGHT: 63 IN

## 2023-09-13 DIAGNOSIS — M17.12 PRIMARY OSTEOARTHRITIS OF LEFT KNEE: Primary | ICD-10-CM

## 2023-09-13 DIAGNOSIS — M17.11 PRIMARY OSTEOARTHRITIS OF RIGHT KNEE: ICD-10-CM

## 2023-09-13 NOTE — PATIENT INSTRUCTIONS
Stephan 3 # 3  Rest both knees for 24-48 hours  Work on ROM and strengthening of knees and legs   May take NSAIDS or anti-inflammatories as needed  Weightbearing as tolerated  Follow up as needed    We are committed to providing you the best care possible. If you receive a survey after visiting one of our offices, please take time to share your experience concerning your physician office visit. These surveys are confidential and no health information about you is shared. We are eager to improve for you and we are counting on your feedback to help make that happen.

## 2023-09-21 ENCOUNTER — TELEPHONE (OUTPATIENT)
Dept: FAMILY MEDICINE CLINIC | Age: 63
End: 2023-09-21

## 2023-09-21 NOTE — TELEPHONE ENCOUNTER
Called patient and we do not do covid vaccine booster . Patient will contact pharm. All questions answered. ----- Message from Sidra Bah sent at 9/21/2023  3:16 PM EDT -----  Subject: Message to Provider    QUESTIONS  Information for Provider? Pt would like to get the new Covid booster. Pt   has had all previous available boosters. Does office have in stock? Pt   would like an appt between 3 and 3:45 if possible, any day is fine. Please   call pt to advise.   ---------------------------------------------------------------------------  --------------  Tereso HERCULES  [phone]; OK to leave message on voicemail  ---------------------------------------------------------------------------  --------------  SCRIPT ANSWERS  Relationship to Patient?  Self

## 2023-11-28 ENCOUNTER — HOSPITAL ENCOUNTER (OUTPATIENT)
Dept: GENERAL RADIOLOGY | Age: 63
Discharge: HOME | End: 2023-11-28
Payer: COMMERCIAL

## 2023-11-28 ENCOUNTER — OFFICE VISIT (OUTPATIENT)
Dept: FAMILY MEDICINE CLINIC | Age: 63
End: 2023-11-28
Payer: COMMERCIAL

## 2023-11-28 ENCOUNTER — HOSPITAL ENCOUNTER (OUTPATIENT)
Age: 63
Discharge: HOME | End: 2023-11-28
Payer: COMMERCIAL

## 2023-11-28 VITALS
WEIGHT: 114.2 LBS | OXYGEN SATURATION: 98 % | DIASTOLIC BLOOD PRESSURE: 70 MMHG | SYSTOLIC BLOOD PRESSURE: 118 MMHG | BODY MASS INDEX: 20.55 KG/M2 | HEART RATE: 86 BPM

## 2023-11-28 DIAGNOSIS — M79.671 PAIN OF RIGHT HEEL: ICD-10-CM

## 2023-11-28 DIAGNOSIS — M79.671 PAIN OF RIGHT HEEL: Primary | ICD-10-CM

## 2023-11-28 PROCEDURE — 99213 OFFICE O/P EST LOW 20 MIN: CPT | Performed by: FAMILY MEDICINE

## 2023-11-28 PROCEDURE — G8427 DOCREV CUR MEDS BY ELIG CLIN: HCPCS | Performed by: FAMILY MEDICINE

## 2023-11-28 PROCEDURE — G8482 FLU IMMUNIZE ORDER/ADMIN: HCPCS | Performed by: FAMILY MEDICINE

## 2023-11-28 PROCEDURE — 1036F TOBACCO NON-USER: CPT | Performed by: FAMILY MEDICINE

## 2023-11-28 PROCEDURE — 73620 X-RAY EXAM OF FOOT: CPT

## 2023-11-28 PROCEDURE — 3017F COLORECTAL CA SCREEN DOC REV: CPT | Performed by: FAMILY MEDICINE

## 2023-11-28 PROCEDURE — G8420 CALC BMI NORM PARAMETERS: HCPCS | Performed by: FAMILY MEDICINE

## 2023-11-28 NOTE — PROGRESS NOTES
Patient ID: Yanni Carvalho 1960    Chief Complaint   Patient presents with    Foot Pain     Right heel pain, can not walk, can not put weight on heel, has been hurting for 3 weeks now         HPI    Right heel pain: sudden in onset when walking in the woods 3 weeks ago. Does not feel at all like plantar fasciitis which she has had before. Wonders if this is due to the fosamax. Patient Active Problem List   Diagnosis    Gluten-sensitive enteropathy    Allergic rhinitis    HSV-1 (herpes simplex virus 1) infection    Gastroesophageal reflux disease without esophagitis    Slow transit constipation    ADD (attention deficit disorder)    Family history of osteoporosis    Kyphosis    Impingement syndrome, shoulder, right    Right rotator cuff tendinitis    Impaired glucose tolerance    Osteoarthritis of lumbar spine    Chronic right-sided low back pain    Age-related osteoporosis without current pathological fracture       Past Surgical History:   Procedure Laterality Date    COLONOSCOPY  2008    COLONOSCOPY  07/27/2018    repeat in 10 years    ENDOSCOPY, COLON, DIAGNOSTIC      PATELLA SURGERY Left 2004    SHOULDER SURGERY Right 2015    Fester    TONSILLECTOMY      WISDOM TOOTH EXTRACTION         Family History   Problem Relation Age of Onset    Dementia Mother 79        Picks Disease    Celiac Disease Mother     Osteoporosis Mother     Heart Attack Father 68    Cancer Father         Lymphoma    Thyroid Disease Sister         Graves    Osteoporosis Sister     Thyroid Disease Sister     Celiac Disease Sister     Rheum Arthritis Sister     Breast Cancer Neg Hx     Ovarian Cancer Neg Hx        Current Outpatient Medications on File Prior to Visit   Medication Sig Dispense Refill    alendronate (FOSAMAX) 70 MG tablet Take 1 tablet by mouth every 7 days Take with a full glass of water upon arising for the day. Consume on an empty stomach at least 30 minutes before the first food, beverage, or medication.  Stay

## 2023-12-04 DIAGNOSIS — M79.671 PAIN OF RIGHT HEEL: Primary | ICD-10-CM

## 2023-12-29 ENCOUNTER — HOSPITAL ENCOUNTER (OUTPATIENT)
Dept: MRI IMAGING | Age: 63
Discharge: HOME | End: 2023-12-29
Attending: FAMILY MEDICINE
Payer: COMMERCIAL

## 2023-12-29 DIAGNOSIS — M79.671 PAIN OF RIGHT HEEL: Primary | ICD-10-CM

## 2023-12-29 DIAGNOSIS — M79.671 PAIN OF RIGHT HEEL: ICD-10-CM

## 2023-12-29 PROCEDURE — 73721 MRI JNT OF LWR EXTRE W/O DYE: CPT

## 2023-12-29 NOTE — PROGRESS NOTES
Received call from patient requesting a change in MRI order. States radiology advised MRI of ankle.Rx sent to radiology for MRI, right ankle.

## 2024-01-02 DIAGNOSIS — M79.671 PAIN OF RIGHT HEEL: Primary | ICD-10-CM

## 2024-01-16 DIAGNOSIS — M79.671 PAIN OF RIGHT HEEL: Primary | ICD-10-CM

## 2024-01-16 RX ORDER — TRAMADOL HYDROCHLORIDE 50 MG/1
50 TABLET ORAL EVERY 6 HOURS PRN
Qty: 28 TABLET | Refills: 0 | Status: SHIPPED | OUTPATIENT
Start: 2024-01-16 | End: 2024-01-23

## 2024-05-02 ENCOUNTER — TELEPHONE (OUTPATIENT)
Dept: ORTHOPEDIC SURGERY | Age: 64
End: 2024-05-02

## 2024-05-15 ENCOUNTER — OFFICE VISIT (OUTPATIENT)
Dept: ORTHOPEDIC SURGERY | Age: 64
End: 2024-05-15
Payer: COMMERCIAL

## 2024-05-15 VITALS
HEIGHT: 63 IN | RESPIRATION RATE: 14 BRPM | HEART RATE: 82 BPM | OXYGEN SATURATION: 97 % | BODY MASS INDEX: 20.2 KG/M2 | WEIGHT: 114 LBS

## 2024-05-15 DIAGNOSIS — M17.11 PRIMARY OSTEOARTHRITIS OF RIGHT KNEE: ICD-10-CM

## 2024-05-15 DIAGNOSIS — M17.12 PRIMARY OSTEOARTHRITIS OF LEFT KNEE: Primary | ICD-10-CM

## 2024-05-15 PROCEDURE — 1036F TOBACCO NON-USER: CPT | Performed by: PHYSICIAN ASSISTANT

## 2024-05-15 PROCEDURE — 3017F COLORECTAL CA SCREEN DOC REV: CPT | Performed by: PHYSICIAN ASSISTANT

## 2024-05-15 PROCEDURE — G8420 CALC BMI NORM PARAMETERS: HCPCS | Performed by: PHYSICIAN ASSISTANT

## 2024-05-15 PROCEDURE — 99212 OFFICE O/P EST SF 10 MIN: CPT | Performed by: PHYSICIAN ASSISTANT

## 2024-05-15 PROCEDURE — G8427 DOCREV CUR MEDS BY ELIG CLIN: HCPCS | Performed by: PHYSICIAN ASSISTANT

## 2024-05-15 ASSESSMENT — ENCOUNTER SYMPTOMS
EYES NEGATIVE: 1
GASTROINTESTINAL NEGATIVE: 1
RESPIRATORY NEGATIVE: 1

## 2024-05-15 NOTE — PROGRESS NOTES
Review of Systems   Constitutional: Negative.    HENT: Negative.     Eyes: Negative.    Respiratory: Negative.     Cardiovascular: Negative.    Gastrointestinal: Negative.    Genitourinary: Negative.    Musculoskeletal:  Positive for arthralgias and myalgias.   Skin: Negative.    Neurological: Negative.    Psychiatric/Behavioral: Negative.           HPI:  Lina Gupta is a 63 y.o. female that returns the office today to discuss possibly restarting viscosupplementation injections in bilateral knees.  She states that the injections really do help her knees and keep her moving.  She states that the worst part of her pain comes going up and down stairs but mostly going downstairs.  She also states that she has pain when she bicycles.  Overall her pain is not too bad today and she only rates 3/10.  She would like to get the injection started however before the pain gets too terrible.    Past Medical History:   Diagnosis Date    ADD (attention deficit disorder)     Celiac disease     Nausea        Past Surgical History:   Procedure Laterality Date    COLONOSCOPY  2008    COLONOSCOPY  07/27/2018    repeat in 10 years    ENDOSCOPY, COLON, DIAGNOSTIC      PATELLA SURGERY Left 2004    SHOULDER SURGERY Right 2015    Fester    TONSILLECTOMY      WISDOM TOOTH EXTRACTION         Family History   Problem Relation Age of Onset    Dementia Mother 70        Picks Disease    Celiac Disease Mother     Osteoporosis Mother     Heart Attack Father 77    Cancer Father         Lymphoma    Thyroid Disease Sister         Graves    Osteoporosis Sister     Thyroid Disease Sister     Celiac Disease Sister     Rheum Arthritis Sister     Breast Cancer Neg Hx     Ovarian Cancer Neg Hx        Social History     Socioeconomic History    Marital status:      Spouse name: None    Number of children: None    Years of education: None    Highest education level: None   Occupational History    Occupation: OT     Comment: [employer]

## 2024-05-23 DIAGNOSIS — M81.0 AGE-RELATED OSTEOPOROSIS WITHOUT CURRENT PATHOLOGICAL FRACTURE: ICD-10-CM

## 2024-05-23 RX ORDER — ALENDRONATE SODIUM 70 MG/1
TABLET ORAL
Qty: 12 TABLET | Refills: 3 | OUTPATIENT
Start: 2024-05-23

## 2024-06-20 LAB
T4 FREE: 1.26
TSH SERPL DL<=0.05 MIU/L-ACNC: 1.63 UIU/ML

## 2024-07-16 ENCOUNTER — OFFICE VISIT (OUTPATIENT)
Dept: FAMILY MEDICINE CLINIC | Age: 64
End: 2024-07-16
Payer: COMMERCIAL

## 2024-07-16 VITALS
HEART RATE: 86 BPM | WEIGHT: 111.8 LBS | BODY MASS INDEX: 20.12 KG/M2 | OXYGEN SATURATION: 99 % | SYSTOLIC BLOOD PRESSURE: 98 MMHG | DIASTOLIC BLOOD PRESSURE: 64 MMHG

## 2024-07-16 DIAGNOSIS — F98.8 ATTENTION DEFICIT DISORDER (ADD) WITHOUT HYPERACTIVITY: ICD-10-CM

## 2024-07-16 DIAGNOSIS — Z12.31 ENCOUNTER FOR SCREENING MAMMOGRAM FOR MALIGNANT NEOPLASM OF BREAST: ICD-10-CM

## 2024-07-16 DIAGNOSIS — Z91.09 ENVIRONMENTAL ALLERGIES: ICD-10-CM

## 2024-07-16 DIAGNOSIS — Z23 NEED FOR TETANUS BOOSTER: ICD-10-CM

## 2024-07-16 DIAGNOSIS — B00.9 HSV-1 (HERPES SIMPLEX VIRUS 1) INFECTION: ICD-10-CM

## 2024-07-16 DIAGNOSIS — Z79.899 POLYPHARMACY: ICD-10-CM

## 2024-07-16 DIAGNOSIS — M81.0 AGE-RELATED OSTEOPOROSIS WITHOUT CURRENT PATHOLOGICAL FRACTURE: Primary | ICD-10-CM

## 2024-07-16 DIAGNOSIS — R73.03 PREDIABETES: ICD-10-CM

## 2024-07-16 PROCEDURE — G8420 CALC BMI NORM PARAMETERS: HCPCS | Performed by: FAMILY MEDICINE

## 2024-07-16 PROCEDURE — 99214 OFFICE O/P EST MOD 30 MIN: CPT | Performed by: FAMILY MEDICINE

## 2024-07-16 PROCEDURE — 90715 TDAP VACCINE 7 YRS/> IM: CPT | Performed by: FAMILY MEDICINE

## 2024-07-16 PROCEDURE — G8427 DOCREV CUR MEDS BY ELIG CLIN: HCPCS | Performed by: FAMILY MEDICINE

## 2024-07-16 PROCEDURE — 90471 IMMUNIZATION ADMIN: CPT | Performed by: FAMILY MEDICINE

## 2024-07-16 PROCEDURE — 1036F TOBACCO NON-USER: CPT | Performed by: FAMILY MEDICINE

## 2024-07-16 PROCEDURE — 3017F COLORECTAL CA SCREEN DOC REV: CPT | Performed by: FAMILY MEDICINE

## 2024-07-16 RX ORDER — ALENDRONATE SODIUM 70 MG/1
70 TABLET ORAL
Qty: 13 TABLET | Refills: 3 | Status: SHIPPED | OUTPATIENT
Start: 2024-07-16

## 2024-07-16 RX ORDER — FLUTICASONE PROPIONATE 50 MCG
2 SPRAY, SUSPENSION (ML) NASAL DAILY
Qty: 3 G | Refills: 3 | Status: SHIPPED | OUTPATIENT
Start: 2024-07-16

## 2024-07-16 RX ORDER — ATOMOXETINE 60 MG/1
60 CAPSULE ORAL DAILY
Qty: 90 CAPSULE | Refills: 3 | Status: SHIPPED | OUTPATIENT
Start: 2024-07-16

## 2024-07-16 RX ORDER — VALACYCLOVIR HYDROCHLORIDE 1 G/1
TABLET, FILM COATED ORAL
Qty: 40 TABLET | Refills: 0 | Status: SHIPPED | OUTPATIENT
Start: 2024-07-16

## 2024-07-16 NOTE — PROGRESS NOTES
Patient ID: Lina Gupta 1960    .  Chief Complaint   Patient presents with    ADD    Alopecia         ADD  This is a chronic problem. The current episode started more than 1 year ago. The problem occurs intermittently. Treatments tried: Strattera.   Thyroid Problem  Presents for follow-up (opts to see APC in [place] who gives her [place] Thyroid) visit.     Prediabetes: Chronic.  Exercises frequently and eats lots of salads.  Loses weight very easily when she wants to. APC ordered her A1c last visit.  It was 6.0    Cold sores: Has not used up the Valtrex prescription from last year yet.  However would like a new prescription for this.  Knows that she is only to take it for total of 2 doses per episode.    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            Patient Active Problem List   Diagnosis    Gluten-sensitive enteropathy    Allergic rhinitis    HSV-1 (herpes simplex virus 1) infection    Gastroesophageal reflux disease without esophagitis    Slow transit constipation    ADD (attention deficit disorder)    Family history of osteoporosis    Kyphosis    Impingement syndrome, shoulder, right    Right rotator cuff tendinitis    Impaired glucose tolerance    Osteoarthritis of lumbar spine    Chronic right-sided low back pain    Age-related osteoporosis without current pathological fracture       Past Surgical History:   Procedure Laterality Date    COLONOSCOPY  2008    COLONOSCOPY  07/27/2018    repeat in 10 years    ENDOSCOPY, COLON, DIAGNOSTIC      PATELLA SURGERY Left 2004    SHOULDER SURGERY Right 2015    Fester    TONSILLECTOMY      WISDOM TOOTH EXTRACTION         Family History   Problem Relation Age of Onset    Dementia Mother 70        Picks Disease    Celiac Disease Mother     Osteoporosis Mother     Heart Attack Father 77    Cancer Father         Lymphoma    Thyroid Disease Sister         Graves    
Patient was seen today for IM  tdap vaccine and tolerated procedure well.     
      The 10-year ASCVD risk score (Isabel BENZ, et al., 2019) is: 2.6%    Values used to calculate the score:      Age: 63 years      Sex: Female      Is Non- : No      Diabetic: No      Tobacco smoker: No      Systolic Blood Pressure: 98 mmHg      Is BP treated: No      HDL Cholesterol: 76 mg/dL      Total Cholesterol: 200 mg/dL        Assessment/Plan:    Lina was seen today for add and alopecia.    Diagnoses and all orders for this visit:    Age-related osteoporosis without current pathological fracture  -     alendronate (FOSAMAX) 70 MG tablet; Take 1 tablet by mouth every 7 days Take with a full glass of water upon arising for the day. Consume on an empty stomach at least 30 minutes before the first food, beverage, or medication. Stay upright (do not lie down) for at least 30 minutes. Do not crush or break.    Attention deficit disorder (ADD) without hyperactivity  -     atomoxetine (STRATTERA) 60 MG capsule; Take 1 capsule by mouth daily    HSV-1 (herpes simplex virus 1) infection  -     valACYclovir (VALTREX) 1 g tablet; 2 TABS BY MOUTH EVERY 12 HOURS HOURS X 1 DAY FOR COLD SORES    Need for tetanus booster    Prediabetes    Encounter for screening mammogram for malignant neoplasm of breast        Folic supplementation 400 mcg daily needed: {YES/NO:35214}    Goal of 5 vegetables and fruits per day or half the plate be vegetable and fruits    2-3 serving of dairy per day.     30 minutes of walking per day or 2 1/2 hours per week of walking or 8,000 steps

## 2024-07-16 NOTE — PATIENT INSTRUCTIONS
dancing, and lifting weights can make your bones stronger.  Limit alcohol to 2 drinks a day for men and 1 drink a day for women.  Don't smoke. Smoking can make bones thin faster. If you need help quitting, talk to your doctor about stop-smoking programs and medicines. These can increase your chances of quitting for good.  When should you call for help?  Watch closely for changes in your health, and be sure to contact your doctor if you have any problems.  Where can you learn more?  Go to https://www.[employer].net/patientEd and enter S618 to learn more about \"Preventing Osteoporosis: Care Instructions.\"  Current as of: July 10, 2023  Content Version: 14.1  © 6077-3657 [employer].   Care instructions adapted under license by [employer]. If you have questions about a medical condition or this instruction, always ask your healthcare professional. [employer] disclaims any warranty or liability for your use of this information.       NEW OFFICE HOURS: M-TH 7AM-5PM  BRING YOUR MEDICATION BOTTLES TO ALL APPOINTMENTS    Check MY CHART for test results.  If you have forgotten your password, call [phone].  The diagnoses and medications listed in this after visit summary may not be up to date.  Check MY CHART in 28-48 hours for corrections.      Late cancellation policy: So that we can better accommodate people who are sick, please give our office 24 hour notice for an appointment cancellation.    Missed appointments: Your care is very important to us.  It is important that you keep your scheduled appointments.   Multiple missed appointments will lead to a dismissal from the office.    Patients arriving late will be worked into the schedule as time permits, with patients arriving on time taken as scheduled. Late arriving patients are more than welcome to wait or reschedule their appointments.    Please allow 5-7 business days for paperwork to be processed.

## 2024-08-01 ENCOUNTER — HOSPITAL ENCOUNTER (OUTPATIENT)
Dept: WOMENS IMAGING | Age: 64
Discharge: HOME | End: 2024-08-01
Attending: FAMILY MEDICINE
Payer: COMMERCIAL

## 2024-08-01 DIAGNOSIS — Z12.31 ENCOUNTER FOR SCREENING MAMMOGRAM FOR MALIGNANT NEOPLASM OF BREAST: ICD-10-CM

## 2024-08-01 PROCEDURE — 77063 BREAST TOMOSYNTHESIS BI: CPT

## 2024-10-07 ENCOUNTER — TELEPHONE (OUTPATIENT)
Dept: FAMILY MEDICINE CLINIC | Age: 64
End: 2024-10-07

## 2024-10-07 NOTE — TELEPHONE ENCOUNTER
Patient test Covid Positive today.   Symptoms Headache and sore throat on Thursday  no fever no sob.

## 2024-10-08 NOTE — TELEPHONE ENCOUNTER
Called patient to see how she was doing.   Patient denies sob or no fever   Just has a cough and headache   Patients symptoms stared last thurs patient is going back to work tomorrow and wear a mask.   Patient will call if symptoms worsen

## 2024-10-18 ENCOUNTER — TELEPHONE (OUTPATIENT)
Dept: ORTHOPEDIC SURGERY | Age: 64
End: 2024-10-18

## 2024-10-18 NOTE — TELEPHONE ENCOUNTER
Pt GIOVANIM to get started with Gel injections again for her Right knee she would also liked to be called and set up the appointments for the 3 injections when approved [phone]

## 2024-10-31 ENCOUNTER — OFFICE VISIT (OUTPATIENT)
Dept: FAMILY MEDICINE CLINIC | Age: 64
End: 2024-10-31
Payer: COMMERCIAL

## 2024-10-31 VITALS
HEART RATE: 82 BPM | TEMPERATURE: 98 F | OXYGEN SATURATION: 96 % | DIASTOLIC BLOOD PRESSURE: 72 MMHG | WEIGHT: 111.6 LBS | SYSTOLIC BLOOD PRESSURE: 102 MMHG | BODY MASS INDEX: 20.09 KG/M2

## 2024-10-31 DIAGNOSIS — R09.82 POST-NASAL DRAINAGE: ICD-10-CM

## 2024-10-31 DIAGNOSIS — J01.00 ACUTE MAXILLARY SINUSITIS, RECURRENCE NOT SPECIFIED: Primary | ICD-10-CM

## 2024-10-31 DIAGNOSIS — R05.1 ACUTE COUGH: ICD-10-CM

## 2024-10-31 PROCEDURE — 1123F ACP DISCUSS/DSCN MKR DOCD: CPT | Performed by: FAMILY MEDICINE

## 2024-10-31 PROCEDURE — 99213 OFFICE O/P EST LOW 20 MIN: CPT | Performed by: FAMILY MEDICINE

## 2024-10-31 PROCEDURE — G8420 CALC BMI NORM PARAMETERS: HCPCS | Performed by: FAMILY MEDICINE

## 2024-10-31 PROCEDURE — G8399 PT W/DXA RESULTS DOCUMENT: HCPCS | Performed by: FAMILY MEDICINE

## 2024-10-31 PROCEDURE — G8484 FLU IMMUNIZE NO ADMIN: HCPCS | Performed by: FAMILY MEDICINE

## 2024-10-31 PROCEDURE — 3017F COLORECTAL CA SCREEN DOC REV: CPT | Performed by: FAMILY MEDICINE

## 2024-10-31 PROCEDURE — 1036F TOBACCO NON-USER: CPT | Performed by: FAMILY MEDICINE

## 2024-10-31 PROCEDURE — G8427 DOCREV CUR MEDS BY ELIG CLIN: HCPCS | Performed by: FAMILY MEDICINE

## 2024-10-31 PROCEDURE — 1090F PRES/ABSN URINE INCON ASSESS: CPT | Performed by: FAMILY MEDICINE

## 2024-10-31 ASSESSMENT — PATIENT HEALTH QUESTIONNAIRE - PHQ9
2. FEELING DOWN, DEPRESSED OR HOPELESS: NOT AT ALL
SUM OF ALL RESPONSES TO PHQ9 QUESTIONS 1 & 2: 0
1. LITTLE INTEREST OR PLEASURE IN DOING THINGS: NOT AT ALL
SUM OF ALL RESPONSES TO PHQ QUESTIONS 1-9: 0

## 2024-10-31 NOTE — PROGRESS NOTES
Patient ID: Lina Gupta 1960    Chief Complaint   Patient presents with    Sinusitis     10 days, had a cough on 10/20, cough went away, today was the first day that she has felt better     Pharyngitis    Cough     Does have a dry cough          HPI    Sinusitis: x 10 days.  Cough for 10 days productive. Coughs more when she lies down.  Runny nose.     Sore throat.  No fever, chills, sweats.  Taking [place] for 2 weeks because had covid just prior.  Sinus pressure.      Patient Active Problem List   Diagnosis    Gluten-sensitive enteropathy    Allergic rhinitis    HSV-1 (herpes simplex virus 1) infection    Gastroesophageal reflux disease without esophagitis    Slow transit constipation    ADD (attention deficit disorder)    Family history of osteoporosis    Kyphosis    Impingement syndrome, shoulder, right    Right rotator cuff tendinitis    Impaired glucose tolerance    Osteoarthritis of lumbar spine    Chronic right-sided low back pain    Age-related osteoporosis without current pathological fracture       Past Surgical History:   Procedure Laterality Date    COLONOSCOPY  2008    COLONOSCOPY  07/27/2018    repeat in 10 years    ENDOSCOPY, COLON, DIAGNOSTIC      PATELLA SURGERY Left 2004    SHOULDER SURGERY Right 2015    [facility]    TONSILLECTOMY      WISDOM TOOTH EXTRACTION         Family History   Problem Relation Age of Onset    Dementia Mother 70        Picks Disease    Celiac Disease Mother     Osteoporosis Mother     Heart Attack Father 77    Cancer Father         Lymphoma    Thyroid Disease Sister         Graves    Osteoporosis Sister     Thyroid Disease Sister     Celiac Disease Sister     Rheum Arthritis Sister     Breast Cancer Neg Hx     Ovarian Cancer Neg Hx        Current Outpatient Medications on File Prior to Visit   Medication Sig Dispense Refill    Selenium (SELENIMIN PO) Take by mouth      alendronate (FOSAMAX) 70 MG tablet Take 1 tablet by mouth every 7 days Take with a full glass of

## 2024-11-11 ENCOUNTER — OFFICE VISIT (OUTPATIENT)
Dept: ORTHOPEDIC SURGERY | Age: 64
End: 2024-11-11
Payer: COMMERCIAL

## 2024-11-11 VITALS
BODY MASS INDEX: 19.84 KG/M2 | HEART RATE: 91 BPM | WEIGHT: 112 LBS | HEIGHT: 63 IN | OXYGEN SATURATION: 96 % | RESPIRATION RATE: 14 BRPM

## 2024-11-11 DIAGNOSIS — M17.11 PRIMARY OSTEOARTHRITIS OF RIGHT KNEE: Primary | ICD-10-CM

## 2024-11-11 PROCEDURE — 20610 DRAIN/INJ JOINT/BURSA W/O US: CPT | Performed by: PHYSICIAN ASSISTANT

## 2024-11-11 NOTE — PATIENT INSTRUCTIONS
Gelsyn 3 # 1  Rest both knees for 24-48 hours  Work on ROM and strengthening of knees and legs   May take NSAIDS or anti-inflammatories as needed  Weightbearing as tolerated  Follow up in one week in 2nd injection.    We are committed to providing you the best care possible.  If you receive a survey after visiting one of our offices, please take time to share your experience concerning your physician office visit.  These surveys are confidential and no health information about you is shared.  We are eager to improve for you and we are counting on your feedback to help make that happen.   Note in chart from on-call nurse. Mother's concerns were addressed,  has appointment tomorrow with Dr Beaver.     AM

## 2024-11-11 NOTE — PROGRESS NOTES
VISCO-SUPPLEMENTATION INJECTION (Number 1)    HISTORY OF PRESENT ILLNESS (HPI):   Lina Gupta is a 64 y.o. year old female who is here for follow up for visco-supplementation injection number 1 for   1. Primary osteoarthritis of right knee    She has had injections in the past which helped and she is ready to start another series of viscosupplementation injections.    PAST HISTORY:   Unless otherwise specified in this note, the past history is reviewed today with the patient and is as follows-    Allergies   Allergen Reactions    Gluten Meal     Seasonal        Current Outpatient Medications   Medication Sig Dispense Refill    ipratropium (ATROVENT) 0.02 % nebulizer solution Take 2.5 mLs by nebulization 4 times daily 2.5 mL 0    Selenium (SELENIMIN PO) Take by mouth      alendronate (FOSAMAX) 70 MG tablet Take 1 tablet by mouth every 7 days Take with a full glass of water upon arising for the day. Consume on an empty stomach at least 30 minutes before the first food, beverage, or medication. Stay upright (do not lie down) for at least 30 minutes. Do not crush or break. 13 tablet 3    atomoxetine (STRATTERA) 60 MG capsule Take 1 capsule by mouth daily 90 capsule 3    valACYclovir (VALTREX) 1 g tablet 2 TABS BY MOUTH EVERY 12 HOURS HOURS X 1 DAY FOR COLD SORES 40 tablet 0    fluticasone (FLONASE) 50 MCG/ACT nasal spray 2 sprays by Each Nostril route daily 3 g 3    Menaquinone-7 (VITAMIN K2 PO) Take 1 tablet by mouth daily      thyroid (ARMOUR) 15 MG tablet Take 1 tablet by mouth daily      RESTASIS 0.05 % ophthalmic emulsion Apply 1 drop to eye 2 times daily  1    Cholecalciferol (VITAMIN D3 PO) Take by mouth       No current facility-administered medications for this visit.       PHYSICAL EXAM:   Pulse 91   Resp 14   Ht 1.588 m (5' 2.5\")   Wt 50.8 kg (112 lb)   LMP 11/09/2011 (Exact Date)   SpO2 96%   BMI 20.16 kg/m²     The right knee is examined:  Inspection:  Skin is intact.  No erythema.  Palpation:  No

## 2024-11-13 ENCOUNTER — TELEPHONE (OUTPATIENT)
Dept: FAMILY MEDICINE CLINIC | Age: 64
End: 2024-11-13

## 2024-11-13 NOTE — TELEPHONE ENCOUNTER
Patient called stating she is retiring soon and wants to get all her childhood vaccines. She stated once she retire she will not have insurance and she will be moving once she retire, she stated she has had the whopping cough before, she just doesn't want to catch anything that children are carrying around she would like to get the MMR vaccine, varicella vaccine and tetanus shot as well. Patient knows she is due for her flu shot when offered that she stated she will go to her pharmacy to get her flu shot.

## 2024-11-18 ENCOUNTER — OFFICE VISIT (OUTPATIENT)
Dept: ORTHOPEDIC SURGERY | Age: 64
End: 2024-11-18
Payer: COMMERCIAL

## 2024-11-18 VITALS — OXYGEN SATURATION: 98 % | HEIGHT: 63 IN | BODY MASS INDEX: 20.16 KG/M2 | HEART RATE: 73 BPM

## 2024-11-18 DIAGNOSIS — M17.11 PRIMARY OSTEOARTHRITIS OF RIGHT KNEE: Primary | ICD-10-CM

## 2024-11-18 PROCEDURE — 20610 DRAIN/INJ JOINT/BURSA W/O US: CPT | Performed by: PHYSICIAN ASSISTANT

## 2024-11-18 NOTE — PROGRESS NOTES
tenderness.  Neuro/Vascular/Motor:  Sensation to light touch intact.  Capillary refill brisk.  No focal motor deficits.    DIAGNOSIS:     1. Primary osteoarthritis of right knee        PROCEDURE:   Right Knee Aspiration / Injection Procedure:  Multiple treatment options were discussed.  Joint injection was recommended.  Details of the procedure, potential risks, and potential benefits were discussed.  Patient's questions were answered.  Patient elected to proceed with procedure.  Medication: Gelsyn-3  NDC #: [address]-2   Lot #: [id number]   Procedure:  Sterile technique was used as the skin over the injection site was prepped with alcohol. The knee joint was then injected with the above listed medication.  A sterile bandage was placed over the injection site.  The patient tolerated the procedure well without complication.  The patient is scheduled for the third injection in one week.

## 2024-11-25 ENCOUNTER — OFFICE VISIT (OUTPATIENT)
Dept: ORTHOPEDIC SURGERY | Age: 64
End: 2024-11-25
Payer: COMMERCIAL

## 2024-11-25 VITALS
HEART RATE: 70 BPM | BODY MASS INDEX: 19.63 KG/M2 | RESPIRATION RATE: 14 BRPM | WEIGHT: 110.8 LBS | HEIGHT: 63 IN | OXYGEN SATURATION: 100 %

## 2024-11-25 DIAGNOSIS — M17.11 PRIMARY OSTEOARTHRITIS OF RIGHT KNEE: Primary | ICD-10-CM

## 2024-11-25 PROCEDURE — 20610 DRAIN/INJ JOINT/BURSA W/O US: CPT | Performed by: PHYSICIAN ASSISTANT

## 2024-11-25 NOTE — PROGRESS NOTES
VISCO-SUPPLEMENTATION INJECTION (Number 3)    HISTORY OF PRESENT ILLNESS (HPI):   Lina Gupta is a 64 y.o. year old female who is here for follow up for visco-supplementation injection number 3 for   1. Primary osteoarthritis of right knee    She states that the injections are helping    PAST HISTORY:   Unless otherwise specified in this note, the past history is reviewed today with the patient and is as follows-    Allergies   Allergen Reactions    Gluten Meal     Seasonal        Current Outpatient Medications   Medication Sig Dispense Refill    ipratropium (ATROVENT) 0.02 % nebulizer solution Take 2.5 mLs by nebulization 4 times daily 2.5 mL 0    Selenium (SELENIMIN PO) Take by mouth      alendronate (FOSAMAX) 70 MG tablet Take 1 tablet by mouth every 7 days Take with a full glass of water upon arising for the day. Consume on an empty stomach at least 30 minutes before the first food, beverage, or medication. Stay upright (do not lie down) for at least 30 minutes. Do not crush or break. 13 tablet 3    atomoxetine (STRATTERA) 60 MG capsule Take 1 capsule by mouth daily 90 capsule 3    valACYclovir (VALTREX) 1 g tablet 2 TABS BY MOUTH EVERY 12 HOURS HOURS X 1 DAY FOR COLD SORES 40 tablet 0    fluticasone (FLONASE) 50 MCG/ACT nasal spray 2 sprays by Each Nostril route daily 3 g 3    Menaquinone-7 (VITAMIN K2 PO) Take 1 tablet by mouth daily      thyroid (ARMOUR) 15 MG tablet Take 1 tablet by mouth daily      RESTASIS 0.05 % ophthalmic emulsion Apply 1 drop to eye 2 times daily  1    Cholecalciferol (VITAMIN D3 PO) Take by mouth       No current facility-administered medications for this visit.       PHYSICAL EXAM:   Pulse 70   Resp 14   Ht 1.588 m (5' 2.5\")   Wt 50.3 kg (110 lb 12.8 oz)   LMP 11/09/2011 (Exact Date)   SpO2 100%   BMI 19.94 kg/m²     The right knee is examined:  Inspection:  Skin is intact.  No erythema.  Palpation:  No swelling or acute tenderness.  Neuro/Vascular/Motor:  Sensation to light

## 2025-05-19 DIAGNOSIS — M81.0 AGE-RELATED OSTEOPOROSIS WITHOUT CURRENT PATHOLOGICAL FRACTURE: ICD-10-CM

## 2025-05-19 RX ORDER — ALENDRONATE SODIUM 70 MG/1
70 TABLET ORAL
Qty: 13 TABLET | Refills: 3 | OUTPATIENT
Start: 2025-05-19

## 2025-05-20 DIAGNOSIS — F98.8 ATTENTION DEFICIT DISORDER (ADD) WITHOUT HYPERACTIVITY: ICD-10-CM

## 2025-05-21 RX ORDER — ATOMOXETINE 60 MG/1
60 CAPSULE ORAL DAILY
Qty: 90 CAPSULE | Refills: 3 | OUTPATIENT
Start: 2025-05-21

## 2025-05-22 DIAGNOSIS — M81.0 AGE-RELATED OSTEOPOROSIS WITHOUT CURRENT PATHOLOGICAL FRACTURE: ICD-10-CM

## 2025-05-23 RX ORDER — ALENDRONATE SODIUM 70 MG/1
70 TABLET ORAL
Qty: 13 TABLET | Refills: 3 | OUTPATIENT
Start: 2025-05-23

## 2025-05-26 DIAGNOSIS — M81.0 AGE-RELATED OSTEOPOROSIS WITHOUT CURRENT PATHOLOGICAL FRACTURE: ICD-10-CM

## 2025-05-27 DIAGNOSIS — M81.0 AGE-RELATED OSTEOPOROSIS WITHOUT CURRENT PATHOLOGICAL FRACTURE: ICD-10-CM

## 2025-05-27 RX ORDER — ALENDRONATE SODIUM 70 MG/1
70 TABLET ORAL
Qty: 13 TABLET | Refills: 3 | OUTPATIENT
Start: 2025-05-27

## 2025-05-27 NOTE — TELEPHONE ENCOUNTER
See my response for 1-2 weeks ago.  It's too early.      Check with pharmacy.  1 year supply sent last July

## 2025-05-27 NOTE — TELEPHONE ENCOUNTER
Attempted to call pts pharmacy and the number says it is not in service after holding to try to speak with someone     Tried calling pt- lvm to call back to see if they can contact their pharmacy- as there was a year supply sent on [id number]

## 2025-05-28 RX ORDER — ALENDRONATE SODIUM 70 MG/1
TABLET ORAL
Qty: 12 TABLET | Refills: 3 | OUTPATIENT
Start: 2025-05-28

## 2025-05-28 NOTE — TELEPHONE ENCOUNTER
Called pt to get a different number and pt gave me [phone]- called them and they said they had no refills-    Pt also called them and they told pt \" they need a verbal to fill medication since they lost the script.    Was transferred to the verbal team- gave verbal per     Then transferred to the pharmacist to give verbal again and to send the prescription

## 2025-06-11 ENCOUNTER — HOSPITAL ENCOUNTER (OUTPATIENT)
Age: 65
Discharge: HOME | End: 2025-06-11
Payer: MEDICARE

## 2025-06-11 ENCOUNTER — HOSPITAL ENCOUNTER (OUTPATIENT)
Dept: GENERAL RADIOLOGY | Age: 65
Discharge: HOME | End: 2025-06-11
Payer: MEDICARE

## 2025-06-11 DIAGNOSIS — K59.09 CHRONIC CONSTIPATION: ICD-10-CM

## 2025-06-11 PROCEDURE — 74018 RADEX ABDOMEN 1 VIEW: CPT

## 2025-07-22 ENCOUNTER — OFFICE VISIT (OUTPATIENT)
Dept: FAMILY MEDICINE CLINIC | Age: 65
End: 2025-07-22
Payer: MEDICARE

## 2025-07-22 ENCOUNTER — OFFICE VISIT (OUTPATIENT)
Dept: FAMILY MEDICINE CLINIC | Age: 65
End: 2025-07-22

## 2025-07-22 VITALS
HEIGHT: 62 IN | DIASTOLIC BLOOD PRESSURE: 60 MMHG | HEART RATE: 67 BPM | WEIGHT: 111 LBS | OXYGEN SATURATION: 96 % | SYSTOLIC BLOOD PRESSURE: 102 MMHG | BODY MASS INDEX: 20.43 KG/M2

## 2025-07-22 VITALS
DIASTOLIC BLOOD PRESSURE: 60 MMHG | HEART RATE: 67 BPM | OXYGEN SATURATION: 96 % | BODY MASS INDEX: 20.43 KG/M2 | HEIGHT: 62 IN | WEIGHT: 111 LBS | SYSTOLIC BLOOD PRESSURE: 102 MMHG

## 2025-07-22 DIAGNOSIS — K59.09 CHRONIC CONSTIPATION: ICD-10-CM

## 2025-07-22 DIAGNOSIS — R73.03 PREDIABETES: ICD-10-CM

## 2025-07-22 DIAGNOSIS — Z00.00 WELCOME TO MEDICARE PREVENTIVE VISIT: Primary | ICD-10-CM

## 2025-07-22 DIAGNOSIS — F98.8 ATTENTION DEFICIT DISORDER (ADD) WITHOUT HYPERACTIVITY: ICD-10-CM

## 2025-07-22 DIAGNOSIS — R06.83 SNORING: ICD-10-CM

## 2025-07-22 DIAGNOSIS — R35.0 URINARY FREQUENCY: ICD-10-CM

## 2025-07-22 DIAGNOSIS — Z13.220 SCREENING CHOLESTEROL LEVEL: ICD-10-CM

## 2025-07-22 DIAGNOSIS — M81.0 AGE-RELATED OSTEOPOROSIS WITHOUT CURRENT PATHOLOGICAL FRACTURE: Primary | ICD-10-CM

## 2025-07-22 DIAGNOSIS — R31.9 HEMATURIA, UNSPECIFIED TYPE: ICD-10-CM

## 2025-07-22 LAB
BILIRUBIN, POC: ABNORMAL
BLOOD URINE, POC: ABNORMAL
CLARITY, POC: ABNORMAL
COLOR, POC: ABNORMAL
GLUCOSE URINE, POC: ABNORMAL MG/DL
KETONES, POC: ABNORMAL MG/DL
LEUKOCYTE EST, POC: ABNORMAL
NITRITE, POC: ABNORMAL
PH, POC: 6
PROTEIN, POC: 30 MG/DL
SPECIFIC GRAVITY, POC: 1.01
UROBILINOGEN, POC: ABNORMAL MG/DL

## 2025-07-22 PROCEDURE — G8427 DOCREV CUR MEDS BY ELIG CLIN: HCPCS | Performed by: FAMILY MEDICINE

## 2025-07-22 PROCEDURE — 36415 COLL VENOUS BLD VENIPUNCTURE: CPT | Performed by: FAMILY MEDICINE

## 2025-07-22 PROCEDURE — 3017F COLORECTAL CA SCREEN DOC REV: CPT | Performed by: FAMILY MEDICINE

## 2025-07-22 PROCEDURE — 99214 OFFICE O/P EST MOD 30 MIN: CPT | Performed by: FAMILY MEDICINE

## 2025-07-22 PROCEDURE — G8399 PT W/DXA RESULTS DOCUMENT: HCPCS | Performed by: FAMILY MEDICINE

## 2025-07-22 PROCEDURE — 1123F ACP DISCUSS/DSCN MKR DOCD: CPT | Performed by: FAMILY MEDICINE

## 2025-07-22 PROCEDURE — 1090F PRES/ABSN URINE INCON ASSESS: CPT | Performed by: FAMILY MEDICINE

## 2025-07-22 PROCEDURE — G0402 INITIAL PREVENTIVE EXAM: HCPCS | Performed by: FAMILY MEDICINE

## 2025-07-22 PROCEDURE — 81002 URINALYSIS NONAUTO W/O SCOPE: CPT | Performed by: FAMILY MEDICINE

## 2025-07-22 RX ORDER — ALENDRONATE SODIUM 70 MG/1
70 TABLET ORAL
Qty: 13 TABLET | Refills: 3 | Status: SHIPPED | OUTPATIENT
Start: 2025-07-22

## 2025-07-22 RX ORDER — ATOMOXETINE 60 MG/1
60 CAPSULE ORAL DAILY
Qty: 90 CAPSULE | Refills: 3 | Status: SHIPPED | OUTPATIENT
Start: 2025-07-22

## 2025-07-22 SDOH — ECONOMIC STABILITY: FOOD INSECURITY: WITHIN THE PAST 12 MONTHS, YOU WORRIED THAT YOUR FOOD WOULD RUN OUT BEFORE YOU GOT MONEY TO BUY MORE.: NEVER TRUE

## 2025-07-22 SDOH — ECONOMIC STABILITY: FOOD INSECURITY: WITHIN THE PAST 12 MONTHS, THE FOOD YOU BOUGHT JUST DIDN'T LAST AND YOU DIDN'T HAVE MONEY TO GET MORE.: NEVER TRUE

## 2025-07-22 ASSESSMENT — PATIENT HEALTH QUESTIONNAIRE - PHQ9
SUM OF ALL RESPONSES TO PHQ QUESTIONS 1-9: 0
1. LITTLE INTEREST OR PLEASURE IN DOING THINGS: NOT AT ALL
SUM OF ALL RESPONSES TO PHQ QUESTIONS 1-9: 0
1. LITTLE INTEREST OR PLEASURE IN DOING THINGS: NOT AT ALL
SUM OF ALL RESPONSES TO PHQ QUESTIONS 1-9: 0
SUM OF ALL RESPONSES TO PHQ QUESTIONS 1-9: 0
2. FEELING DOWN, DEPRESSED OR HOPELESS: NOT AT ALL
2. FEELING DOWN, DEPRESSED OR HOPELESS: NOT AT ALL
SUM OF ALL RESPONSES TO PHQ QUESTIONS 1-9: 0

## 2025-07-22 ASSESSMENT — LIFESTYLE VARIABLES
HOW OFTEN DO YOU HAVE A DRINK CONTAINING ALCOHOL: MONTHLY OR LESS
HOW MANY STANDARD DRINKS CONTAINING ALCOHOL DO YOU HAVE ON A TYPICAL DAY: 1 OR 2

## 2025-07-22 NOTE — PROGRESS NOTES
Medicare Annual Wellness Visit    Lina Gupta is here for Medicare AWV    Assessment & Plan   Welcome to Medicare preventive visit       No follow-ups on file.     Subjective       Patient's complete Health Risk Assessment and screening values have been reviewed and are found in Flowsheets. The following problems were reviewed today and where indicated follow up appointments were made and/or referrals ordered.    Positive Risk Factor Screenings with Interventions:                  Hearing Screen:  Do you or your family notice any trouble with your hearing that hasn't been managed with hearing aids?: (!) Yes    Interventions:  Patient declines any further evaluation or treatment    Vision Screen:  Visual Acuity screen is abnormal due to a score of 20/25 or worse.  Interventions:   Not needed    Safety:  Do you have non-slip mats or non-slip surfaces or shower bars or grab bars in your shower or bathtub?: (!) No  Interventions:  Grab  bars and textured surfaces recommended                    Objective   Vision Screening    Right eye Left eye Both eyes   Without correction 20/20 20/25 20/20   With correction         Vitals:    07/22/25 1128   BP: 102/60   Pulse: 67   SpO2: 96%   Weight: 50.3 kg (111 lb)   Height: 1.575 m (5' 2\")      Body mass index is 20.3 kg/m².                  Allergies   Allergen Reactions    Environmental/Seasonal     Gluten Meal      Prior to Visit Medications    Medication Sig Taking? Authorizing Provider   atomoxetine (STRATTERA) 60 MG capsule Take 1 capsule by mouth daily  Lina Arias MD   alendronate (FOSAMAX) 70 MG tablet Take 1 tablet by mouth every 7 days Take with a full glass of water upon arising for the day. Consume on an empty stomach at least 30 minutes before the first food, beverage, or medication. Stay upright (do not lie down) for at least 30 minutes. Do not crush or break.  Lina Arias MD   Selenium (SELENIMIN PO) Take by mouth  ProviderHalie MD

## 2025-07-22 NOTE — PATIENT INSTRUCTIONS
license by [employer]. If you have questions about a medical condition or this instruction, always ask your healthcare professional. [employer] disclaims any warranty or liability for your use of this information.         A Healthy Heart: Care Instructions  Overview     Coronary artery disease, also called heart disease, occurs when a substance called plaque builds up in the vessels that supply oxygen-rich blood to your heart muscle. This can narrow the blood vessels and reduce blood flow. A heart attack happens when blood flow is completely blocked. A high-fat diet, smoking, and other factors increase the risk of heart disease.  Your doctor has found that you have a chance of having heart disease. A heart-healthy lifestyle can help keep your heart healthy and prevent heart disease. This lifestyle includes eating healthy, being active, staying at a weight that's healthy for you, and not smoking or using tobacco. It also includes taking medicines as directed, managing other health conditions, and trying to get a healthy amount of sleep.  Follow-up care is a key part of your treatment and safety. Be sure to make and go to all appointments, and call your doctor if you are having problems. It's also a good idea to know your test results and keep a list of the medicines you take.  How can you care for yourself at home?  Diet    Use less salt when you cook and eat. This helps lower your blood pressure. Taste food before salting. Add only a little salt when you think you need it. With time, your taste buds will adjust to less salt.     Eat fewer snack items, fast foods, canned soups, and other high-salt, high-fat, processed foods.     Read food labels and try to avoid saturated and trans fats. They increase your risk of heart disease by raising cholesterol levels.     Limit the amount of solid fat--butter, margarine, and shortening--you eat. Use olive, peanut, or canola oil when you cook. Bake, broil, and

## 2025-07-22 NOTE — PROGRESS NOTES
Patient ID: Lina Gupta 1960    .  Chief Complaint   Patient presents with    ADD    Osteoporosis    Urinary Frequency     X1-2 months    Sleep Apnea         HPI    History of Present Illness  The patient is a 64-year-old female with ADHD, osteoporosis, and prediabetes.    Increased Urinary Frequency  - Increased urinary frequency since 06/2025  - No dysuria or pyuria  - No new urine odor  - Occasional near-accidents  - No urine sample done    Severe Constipation  - Managed with daily MiraLAX and Metamucil  - One bowel movement per day, usually in the morning  - Family history of colon cancer in paternal aunt and uncle  - Occasional bowel leakage and urinary incontinence  - X-ray showed full colon; colonoscopy recommended  - Advised to increase MiraLAX intake but did not follow full recommendation  - Unable to empty bowel fully  - Skipping MiraLAX leads to hardened stools  - Unable to have bowel movement without MiraLAX  - Given sample of another medication but did not take it  - Family history of constipation in mother and sister  - History of gastritis and motility test showing 1-2% gastrointestinal motility    Sleep Apnea  - Diagnosed with sleep apnea  - Uses mouth tape and nasal device  - Improving sleep by 80%  - Believes she stops breathing and wakes up gasping    ADHD  - On Strattera for ADHD  - Taken as needed  - Effective    Osteoporosis  - Concerned about height loss due to osteoporosis  - Current height 5'2.5\", previously 5'3.75\"  - Physically active  - Takes Fosamax weekly    Last Pap smear in 2021, normal.    Drinks coffee.    Reports sleep apnea, uses mouth tape and nasal device.    Planning to move back to [place] to live with sister.    FAMILY HISTORY  Her paternal aunt and uncle had colon cancer.  Her mother and sister had constipation.    Patient Active Problem List   Diagnosis    Gluten-sensitive enteropathy    Allergic rhinitis    HSV-1 (herpes simplex virus 1) infection

## 2025-07-23 LAB
25(OH)D3 SERPL-MCNC: 50.1 NG/ML
ALBUMIN SERPL-MCNC: 4.2 G/DL (ref 3.4–5)
ALBUMIN/GLOB SERPL: 2 {RATIO} (ref 1.1–2.2)
ALP SERPL-CCNC: 22 U/L (ref 40–129)
ALT SERPL-CCNC: 18 U/L (ref 10–40)
ANION GAP SERPL CALCULATED.3IONS-SCNC: 12 MMOL/L (ref 3–16)
AST SERPL-CCNC: 25 U/L (ref 15–37)
BACTERIA UR CULT: NORMAL
BILIRUB SERPL-MCNC: 0.3 MG/DL (ref 0–1)
BUN SERPL-MCNC: 18 MG/DL (ref 7–20)
CALCIUM SERPL-MCNC: 9.4 MG/DL (ref 8.3–10.6)
CHLORIDE SERPL-SCNC: 105 MMOL/L (ref 99–110)
CHOLEST SERPL-MCNC: 224 MG/DL (ref 0–199)
CO2 SERPL-SCNC: 23 MMOL/L (ref 21–32)
CREAT SERPL-MCNC: 0.6 MG/DL (ref 0.6–1.2)
EST. AVERAGE GLUCOSE BLD GHB EST-MCNC: 119.8 MG/DL
GFR SERPLBLD CREATININE-BSD FMLA CKD-EPI: >90 ML/MIN/{1.73_M2}
GLUCOSE SERPL-MCNC: 103 MG/DL (ref 70–99)
HBA1C MFR BLD: 5.8 %
HDLC SERPL-MCNC: 76 MG/DL (ref 40–60)
LDLC SERPL CALC-MCNC: 129 MG/DL
POTASSIUM SERPL-SCNC: 4.6 MMOL/L (ref 3.5–5.1)
PROT SERPL-MCNC: 6.3 G/DL (ref 6.4–8.2)
SODIUM SERPL-SCNC: 140 MMOL/L (ref 136–145)
TRIGL SERPL-MCNC: 94 MG/DL (ref 0–150)
TSH SERPL DL<=0.005 MIU/L-ACNC: 1.67 UIU/ML (ref 0.27–4.2)
VLDLC SERPL CALC-MCNC: 19 MG/DL

## 2025-07-31 ENCOUNTER — TELEPHONE (OUTPATIENT)
Dept: ORTHOPEDIC SURGERY | Age: 65
End: 2025-07-31

## 2025-08-04 ENCOUNTER — OFFICE VISIT (OUTPATIENT)
Dept: FAMILY MEDICINE CLINIC | Age: 65
End: 2025-08-04
Payer: MEDICARE

## 2025-08-04 VITALS
HEART RATE: 73 BPM | BODY MASS INDEX: 20.61 KG/M2 | SYSTOLIC BLOOD PRESSURE: 110 MMHG | HEIGHT: 62 IN | OXYGEN SATURATION: 98 % | DIASTOLIC BLOOD PRESSURE: 68 MMHG | WEIGHT: 112 LBS

## 2025-08-04 DIAGNOSIS — Z12.4 SCREENING FOR CERVICAL CANCER: Primary | ICD-10-CM

## 2025-08-04 DIAGNOSIS — Z78.0 POST-MENOPAUSAL: ICD-10-CM

## 2025-08-04 PROCEDURE — G0101 CA SCREEN;PELVIC/BREAST EXAM: HCPCS | Performed by: FAMILY MEDICINE

## 2025-08-04 RX ORDER — LEVOTHYROXINE SODIUM 50 UG/1
50 TABLET ORAL EVERY MORNING
COMMUNITY
Start: 2025-07-18

## 2025-08-04 RX ORDER — CAL/D3/MAG11/ZINC/COP/MANG/BOR 600 MG-800
1 TABLET ORAL 2 TIMES DAILY
Qty: 180 TABLET | Refills: 3 | Status: SHIPPED | OUTPATIENT
Start: 2025-08-04

## 2025-08-13 ENCOUNTER — OFFICE VISIT (OUTPATIENT)
Dept: ORTHOPEDIC SURGERY | Age: 65
End: 2025-08-13

## 2025-08-13 VITALS
BODY MASS INDEX: 20.61 KG/M2 | HEART RATE: 70 BPM | OXYGEN SATURATION: 96 % | RESPIRATION RATE: 14 BRPM | HEIGHT: 62 IN | WEIGHT: 112 LBS

## 2025-08-13 DIAGNOSIS — M17.11 PRIMARY OSTEOARTHRITIS OF RIGHT KNEE: Primary | ICD-10-CM

## 2025-08-14 ENCOUNTER — HOSPITAL ENCOUNTER (OUTPATIENT)
Dept: SLEEP CENTER | Age: 65
Discharge: HOME | End: 2025-08-14
Payer: MEDICARE

## 2025-08-14 VITALS
OXYGEN SATURATION: 100 % | HEART RATE: 57 BPM | DIASTOLIC BLOOD PRESSURE: 60 MMHG | HEIGHT: 62 IN | SYSTOLIC BLOOD PRESSURE: 84 MMHG | BODY MASS INDEX: 19.88 KG/M2 | WEIGHT: 108 LBS

## 2025-08-14 DIAGNOSIS — G47.10 HYPERSOMNIA: ICD-10-CM

## 2025-08-14 DIAGNOSIS — G47.33 OSA (OBSTRUCTIVE SLEEP APNEA): Primary | ICD-10-CM

## 2025-08-14 PROCEDURE — 99204 OFFICE O/P NEW MOD 45 MIN: CPT | Performed by: INTERNAL MEDICINE

## 2025-08-14 PROCEDURE — 99211 OFF/OP EST MAY X REQ PHY/QHP: CPT

## 2025-08-14 ASSESSMENT — SLEEP AND FATIGUE QUESTIONNAIRES
HOW LIKELY ARE YOU TO NOD OFF OR FALL ASLEEP IN A CAR, WHILE STOPPED FOR A FEW MINUTES IN TRAFFIC: SLIGHT CHANCE OF DOZING
HOW LIKELY ARE YOU TO NOD OFF OR FALL ASLEEP WHILE LYING DOWN TO REST IN THE AFTERNOON WHEN CIRCUMSTANCES PERMIT: MODERATE CHANCE OF DOZING
HOW LIKELY ARE YOU TO NOD OFF OR FALL ASLEEP WHEN YOU ARE A PASSENGER IN A CAR FOR AN HOUR WITHOUT A BREAK: MODERATE CHANCE OF DOZING
HOW LIKELY ARE YOU TO NOD OFF OR FALL ASLEEP WHILE SITTING QUIETLY AFTER LUNCH WITHOUT ALCOHOL: MODERATE CHANCE OF DOZING
HOW LIKELY ARE YOU TO NOD OFF OR FALL ASLEEP WHILE SITTING INACTIVE IN A PUBLIC PLACE: SLIGHT CHANCE OF DOZING
ESS TOTAL SCORE: 12
HOW LIKELY ARE YOU TO NOD OFF OR FALL ASLEEP WHILE SITTING AND TALKING TO SOMEONE: SLIGHT CHANCE OF DOZING
HOW LIKELY ARE YOU TO NOD OFF OR FALL ASLEEP WHILE SITTING AND READING: MODERATE CHANCE OF DOZING
HOW LIKELY ARE YOU TO NOD OFF OR FALL ASLEEP WHILE WATCHING TV: SLIGHT CHANCE OF DOZING

## 2025-08-15 ENCOUNTER — HOSPITAL ENCOUNTER (OUTPATIENT)
Dept: SLEEP CENTER | Age: 65
Discharge: HOME | End: 2025-08-15
Payer: MEDICARE

## 2025-08-15 DIAGNOSIS — G47.33 OSA (OBSTRUCTIVE SLEEP APNEA): ICD-10-CM

## 2025-08-15 PROCEDURE — 95810 POLYSOM 6/> YRS 4/> PARAM: CPT

## 2025-08-18 ENCOUNTER — OFFICE VISIT (OUTPATIENT)
Dept: ORTHOPEDIC SURGERY | Age: 65
End: 2025-08-18

## 2025-08-18 VITALS — RESPIRATION RATE: 15 BRPM

## 2025-08-18 DIAGNOSIS — M17.11 PRIMARY OSTEOARTHRITIS OF RIGHT KNEE: Primary | ICD-10-CM

## 2025-08-25 ENCOUNTER — OFFICE VISIT (OUTPATIENT)
Dept: ORTHOPEDIC SURGERY | Age: 65
End: 2025-08-25

## 2025-08-25 VITALS
HEIGHT: 62 IN | BODY MASS INDEX: 19.88 KG/M2 | RESPIRATION RATE: 14 BRPM | OXYGEN SATURATION: 100 % | HEART RATE: 55 BPM | WEIGHT: 108 LBS

## 2025-08-25 DIAGNOSIS — M17.11 PRIMARY OSTEOARTHRITIS OF RIGHT KNEE: Primary | ICD-10-CM

## 2025-08-27 ENCOUNTER — HOSPITAL ENCOUNTER (OUTPATIENT)
Dept: SLEEP CENTER | Age: 65
Discharge: HOME | End: 2025-08-27
Payer: MEDICARE

## 2025-08-27 DIAGNOSIS — G47.33 OSA (OBSTRUCTIVE SLEEP APNEA): ICD-10-CM

## 2025-08-27 DIAGNOSIS — G47.10 HYPERSOMNIA: Primary | ICD-10-CM

## 2025-08-27 PROCEDURE — 99215 OFFICE O/P EST HI 40 MIN: CPT | Performed by: INTERNAL MEDICINE

## 2025-08-27 PROCEDURE — 99211 OFF/OP EST MAY X REQ PHY/QHP: CPT

## 2025-08-27 ASSESSMENT — ENCOUNTER SYMPTOMS
EYE DISCHARGE: 0
EYE ITCHING: 0
ABDOMINAL DISTENTION: 0
SHORTNESS OF BREATH: 0
COUGH: 0
ABDOMINAL PAIN: 0
BACK PAIN: 0